# Patient Record
Sex: MALE | Race: WHITE | NOT HISPANIC OR LATINO | Employment: UNEMPLOYED | URBAN - NONMETROPOLITAN AREA
[De-identification: names, ages, dates, MRNs, and addresses within clinical notes are randomized per-mention and may not be internally consistent; named-entity substitution may affect disease eponyms.]

---

## 2016-05-17 LAB — EXTERNAL HIV SCREEN: NORMAL

## 2018-06-03 LAB
ALBUMIN SERPL BCP-MCNC: 3.9 G/DL (ref 3.5–5.7)
ALP SERPL-CCNC: 81 IU/L (ref 40–150)
ALT SERPL W P-5'-P-CCNC: 17 IU/L (ref 0–50)
AMPHETAMINES (HISTORICAL): NEGATIVE
ANION GAP SERPL CALCULATED.3IONS-SCNC: 12.8 MM/L
AST SERPL W P-5'-P-CCNC: 42 U/L (ref 8–27)
BACTERIA UR QL AUTO: ABNORMAL
BARBITURATES (HISTORICAL): NEGATIVE
BASOPHILS # BLD AUTO: 0 X3/UL (ref 0–0.3)
BASOPHILS # BLD AUTO: 0.7 % (ref 0–2)
BENZODIAZEPINES (HISTORICAL): NEGATIVE
BILIRUB SERPL-MCNC: 1 MG/DL (ref 0.3–1)
BILIRUB UR QL STRIP: NEGATIVE
BUN SERPL-MCNC: 5 MG/DL (ref 7–25)
CALCIUM SERPL-MCNC: 8.6 MG/DL (ref 8.6–10.5)
CANNABINOIDS (HISTORICAL): NEGATIVE
CHLORIDE SERPL-SCNC: 107 MM/L (ref 98–107)
CLARITY UR: CLEAR
CO2 SERPL-SCNC: 27 MM/L (ref 21–31)
COCAINE (HISTORICAL): NEGATIVE
COLOR UR: YELLOW
CREAT SERPL-MCNC: 0.65 MG/DL (ref 0.7–1.3)
DEPRECATED RDW RBC AUTO: 17.9 % (ref 11.5–14.5)
EGFR (HISTORICAL): > 60 GFR
EGFR AFRICAN AMERICAN (HISTORICAL): > 60 GFR
EOSINOPHIL # BLD AUTO: 0.1 X3/UL (ref 0–0.5)
EOSINOPHIL NFR BLD AUTO: 1.5 % (ref 0–5)
ETHANOL (HISTORICAL): 323.8 MG/DL
GLUCOSE (HISTORICAL): 112 MG/DL (ref 65–99)
GLUCOSE UR STRIP-MCNC: NEGATIVE MG/DL
HCT VFR BLD AUTO: 38.2 % (ref 42–52)
HGB BLD-MCNC: 12.6 G/DL (ref 14–18)
HGB UR QL STRIP.AUTO: NEGATIVE
KETONES UR STRIP-MCNC: NEGATIVE MG/DL
LEUKOCYTE ESTERASE UR QL STRIP: NEGATIVE
LYMPHOCYTES # BLD AUTO: 1.5 X3/UL (ref 1.2–4.2)
LYMPHOCYTES NFR BLD AUTO: 32.9 % (ref 20.5–51.1)
MCH RBC QN AUTO: 28.2 PG (ref 26–34)
MCHC RBC AUTO-ENTMCNC: 33.1 G/DL (ref 31–36)
MCV RBC AUTO: 85.2 FL (ref 81–99)
MEDICAL ALCOHOL (HISTORICAL): 0.32 %
METHADONE (HISTORICAL): NEGATIVE
MONOCYTES # BLD AUTO: 0.3 X3/UL (ref 0–1)
MONOCYTES NFR BLD AUTO: 7.8 % (ref 1.7–12)
MUCUS THREADS (HISTORICAL): PRESENT /HPF
NEUTROPHILS # BLD AUTO: 2.5 X3/UL (ref 1.4–6.5)
NEUTS SEG NFR BLD AUTO: 57.1 % (ref 42.2–75.2)
NITRITE UR QL STRIP: NEGATIVE
NON-SQ EPI CELLS URNS QL MICRO: ABNORMAL /HPF
OPIATES (HISTORICAL): NEGATIVE
OSMOLALITY, SERUM (HISTORICAL): 283 MOSM (ref 262–291)
OXYCODONE (HISTORICAL): NEGATIVE
PH UR STRIP.AUTO: 6 [PH] (ref 4.5–8)
PHENCYCLIDINE URINE (HISTORICAL): NEGATIVE
PLATELET # BLD AUTO: 74 X3/UL (ref 130–400)
PLATELET ESTIMATE (HISTORICAL): ABNORMAL
PLEASE NOTE (HISTORICAL): NORMAL
PMV BLD AUTO: 7.1 FL (ref 8.6–11.7)
POTASSIUM SERPL-SCNC: 3.8 MM/L (ref 3.5–5.5)
PROPOXYPHENE (HISTORICAL): NEGATIVE
PROT UR STRIP-MCNC: ABNORMAL MG/DL
RBC # BLD AUTO: 4.49 X6/UL (ref 4.3–5.9)
RBC #/AREA URNS AUTO: ABNORMAL /HPF
RBC MORPHOLOGY (HISTORICAL): NORMAL
SODIUM SERPL-SCNC: 143 MM/L (ref 134–143)
SP GR UR STRIP.AUTO: 1.02 (ref 1–1.03)
TOTAL PROTEIN (HISTORICAL): 6.9 G/DL (ref 6.4–8.9)
TSH SERPL DL<=0.05 MIU/L-ACNC: 2.1 UIU/M (ref 0.45–5.33)
UROBILINOGEN UR QL STRIP.AUTO: 1 EU/DL (ref 0.2–8)
WBC # BLD AUTO: 4.4 X3/UL (ref 4.8–10.8)
WBC #/AREA URNS AUTO: ABNORMAL /HPF

## 2019-04-10 ENCOUNTER — APPOINTMENT (EMERGENCY)
Dept: CT IMAGING | Facility: HOSPITAL | Age: 59
DRG: 469 | End: 2019-04-10
Payer: COMMERCIAL

## 2019-04-10 ENCOUNTER — HOSPITAL ENCOUNTER (INPATIENT)
Facility: HOSPITAL | Age: 59
LOS: 1 days | Discharge: HOME | DRG: 469 | End: 2019-04-11
Attending: EMERGENCY MEDICINE | Admitting: INTERNAL MEDICINE
Payer: COMMERCIAL

## 2019-04-10 DIAGNOSIS — D69.6 THROMBOCYTOPENIA (HCC): ICD-10-CM

## 2019-04-10 DIAGNOSIS — F10.929 ALCOHOL INTOXICATION (HCC): ICD-10-CM

## 2019-04-10 DIAGNOSIS — I95.9 HYPOTENSION: ICD-10-CM

## 2019-04-10 DIAGNOSIS — E86.0 DEHYDRATION: ICD-10-CM

## 2019-04-10 DIAGNOSIS — S00.03XA HEMATOMA OF SCALP, INITIAL ENCOUNTER: ICD-10-CM

## 2019-04-10 DIAGNOSIS — R55 SYNCOPE: Primary | ICD-10-CM

## 2019-04-10 DIAGNOSIS — N17.9 AKI (ACUTE KIDNEY INJURY) (HCC): ICD-10-CM

## 2019-04-10 DIAGNOSIS — E87.6 HYPOKALEMIA: ICD-10-CM

## 2019-04-10 PROBLEM — R79.89 ELEVATED LACTIC ACID LEVEL: Status: ACTIVE | Noted: 2019-04-10

## 2019-04-10 PROBLEM — D73.89 LESION OF SPLEEN: Status: ACTIVE | Noted: 2019-04-10

## 2019-04-10 PROBLEM — R91.1 PULMONARY NODULE: Status: ACTIVE | Noted: 2019-04-10

## 2019-04-10 PROBLEM — K76.6 PORTAL HYPERTENSION (HCC): Status: ACTIVE | Noted: 2019-04-10

## 2019-04-10 PROBLEM — K74.60 LIVER CIRRHOSIS (HCC): Status: ACTIVE | Noted: 2019-04-10

## 2019-04-10 PROBLEM — R59.1 LYMPHADENOPATHY: Status: ACTIVE | Noted: 2019-04-10

## 2019-04-10 PROBLEM — B19.20 HEPATITIS C: Status: ACTIVE | Noted: 2019-04-10

## 2019-04-10 PROBLEM — F10.10 ALCOHOL ABUSE: Status: ACTIVE | Noted: 2017-12-22

## 2019-04-10 LAB
ALBUMIN SERPL BCP-MCNC: 3.3 G/DL (ref 3.5–5)
ALP SERPL-CCNC: 90 U/L (ref 46–116)
ALT SERPL W P-5'-P-CCNC: 34 U/L (ref 12–78)
AMPHETAMINES SERPL QL SCN: NEGATIVE
ANION GAP SERPL CALCULATED.3IONS-SCNC: 10 MMOL/L (ref 4–13)
ANION GAP SERPL CALCULATED.3IONS-SCNC: 15 MMOL/L (ref 4–13)
APAP SERPL-MCNC: <2 UG/ML (ref 10–20)
APTT PPP: 33 SECONDS (ref 26–38)
AST SERPL W P-5'-P-CCNC: 55 U/L (ref 5–45)
ATRIAL RATE: 71 BPM
ATRIAL RATE: 76 BPM
ATRIAL RATE: 79 BPM
ATRIAL RATE: 83 BPM
BARBITURATES UR QL: NEGATIVE
BASOPHILS # BLD AUTO: 0.04 THOUSANDS/ΜL (ref 0–0.1)
BASOPHILS NFR BLD AUTO: 1 % (ref 0–1)
BENZODIAZ UR QL: NEGATIVE
BILIRUB SERPL-MCNC: 1.6 MG/DL (ref 0.2–1)
BILIRUB UR QL STRIP: NEGATIVE
BUN SERPL-MCNC: 27 MG/DL (ref 5–25)
BUN SERPL-MCNC: 36 MG/DL (ref 5–25)
CALCIUM SERPL-MCNC: 7.6 MG/DL (ref 8.3–10.1)
CALCIUM SERPL-MCNC: 8.6 MG/DL (ref 8.3–10.1)
CHLORIDE SERPL-SCNC: 106 MMOL/L (ref 100–108)
CHLORIDE SERPL-SCNC: 95 MMOL/L (ref 100–108)
CK MB SERPL-MCNC: 1.1 % (ref 0–2.5)
CK MB SERPL-MCNC: 4.8 NG/ML (ref 0–5)
CK SERPL-CCNC: 421 U/L (ref 39–308)
CLARITY UR: CLEAR
CO2 SERPL-SCNC: 24 MMOL/L (ref 21–32)
CO2 SERPL-SCNC: 26 MMOL/L (ref 21–32)
COCAINE UR QL: NEGATIVE
COLOR UR: YELLOW
CREAT SERPL-MCNC: 1.57 MG/DL (ref 0.6–1.3)
CREAT SERPL-MCNC: 2.58 MG/DL (ref 0.6–1.3)
EOSINOPHIL # BLD AUTO: 0.07 THOUSAND/ΜL (ref 0–0.61)
EOSINOPHIL NFR BLD AUTO: 1 % (ref 0–6)
ERYTHROCYTE [DISTWIDTH] IN BLOOD BY AUTOMATED COUNT: 17.3 % (ref 11.6–15.1)
ERYTHROCYTE [DISTWIDTH] IN BLOOD BY AUTOMATED COUNT: 17.3 % (ref 11.6–15.1)
ETHANOL SERPL-MCNC: 267 MG/DL (ref 0–3)
GFR SERPL CREATININE-BSD FRML MDRD: 26 ML/MIN/1.73SQ M
GFR SERPL CREATININE-BSD FRML MDRD: 48 ML/MIN/1.73SQ M
GLUCOSE SERPL-MCNC: 102 MG/DL (ref 65–140)
GLUCOSE SERPL-MCNC: 103 MG/DL (ref 65–140)
GLUCOSE SERPL-MCNC: 79 MG/DL (ref 65–140)
GLUCOSE UR STRIP-MCNC: NEGATIVE MG/DL
HCT VFR BLD AUTO: 37.4 % (ref 36.5–49.3)
HCT VFR BLD AUTO: 40.1 % (ref 36.5–49.3)
HGB BLD-MCNC: 11.8 G/DL (ref 12–17)
HGB BLD-MCNC: 13.1 G/DL (ref 12–17)
HGB UR QL STRIP.AUTO: NEGATIVE
IMM GRANULOCYTES # BLD AUTO: 0.03 THOUSAND/UL (ref 0–0.2)
IMM GRANULOCYTES NFR BLD AUTO: 1 % (ref 0–2)
INR PPP: 1.21 (ref 0.86–1.17)
KETONES UR STRIP-MCNC: NEGATIVE MG/DL
LACTATE SERPL-SCNC: 1.2 MMOL/L (ref 0.5–2)
LACTATE SERPL-SCNC: 1.4 MMOL/L (ref 0.5–2)
LACTATE SERPL-SCNC: 2.4 MMOL/L (ref 0.5–2)
LEUKOCYTE ESTERASE UR QL STRIP: NEGATIVE
LIPASE SERPL-CCNC: 420 U/L (ref 73–393)
LYMPHOCYTES # BLD AUTO: 1.38 THOUSANDS/ΜL (ref 0.6–4.47)
LYMPHOCYTES NFR BLD AUTO: 21 % (ref 14–44)
MAGNESIUM SERPL-MCNC: 1.7 MG/DL (ref 1.6–2.6)
MAGNESIUM SERPL-MCNC: 1.9 MG/DL (ref 1.6–2.6)
MCH RBC QN AUTO: 29.9 PG (ref 26.8–34.3)
MCH RBC QN AUTO: 30.1 PG (ref 26.8–34.3)
MCHC RBC AUTO-ENTMCNC: 31.6 G/DL (ref 31.4–37.4)
MCHC RBC AUTO-ENTMCNC: 32.7 G/DL (ref 31.4–37.4)
MCV RBC AUTO: 92 FL (ref 82–98)
MCV RBC AUTO: 95 FL (ref 82–98)
METHADONE UR QL: NEGATIVE
MONOCYTES # BLD AUTO: 0.91 THOUSAND/ΜL (ref 0.17–1.22)
MONOCYTES NFR BLD AUTO: 14 % (ref 4–12)
NEUTROPHILS # BLD AUTO: 4.19 THOUSANDS/ΜL (ref 1.85–7.62)
NEUTS SEG NFR BLD AUTO: 62 % (ref 43–75)
NITRITE UR QL STRIP: NEGATIVE
NRBC BLD AUTO-RTO: 0 /100 WBCS
NT-PROBNP SERPL-MCNC: 425 PG/ML
OPIATES UR QL SCN: NEGATIVE
OSMOLALITY UR/SERPL-RTO: 356 MMOL/KG (ref 282–298)
OSMOLALITY UR: 221 MMOL/KG
P AXIS: 39 DEGREES
P AXIS: 51 DEGREES
P AXIS: 55 DEGREES
P AXIS: 57 DEGREES
PCP UR QL: NEGATIVE
PH UR STRIP.AUTO: 6.5 [PH]
PHOSPHATE SERPL-MCNC: 5 MG/DL (ref 2.7–4.5)
PHOSPHATE SERPL-MCNC: 5.1 MG/DL (ref 2.7–4.5)
PLATELET # BLD AUTO: 46 THOUSANDS/UL (ref 149–390)
PLATELET # BLD AUTO: 64 THOUSANDS/UL (ref 149–390)
PMV BLD AUTO: 10.7 FL (ref 8.9–12.7)
PMV BLD AUTO: 11 FL (ref 8.9–12.7)
POTASSIUM SERPL-SCNC: 3.2 MMOL/L (ref 3.5–5.3)
POTASSIUM SERPL-SCNC: 3.4 MMOL/L (ref 3.5–5.3)
PR INTERVAL: 172 MS
PR INTERVAL: 178 MS
PROCALCITONIN SERPL-MCNC: 0.13 NG/ML
PROT SERPL-MCNC: 7 G/DL (ref 6.4–8.2)
PROT UR STRIP-MCNC: NEGATIVE MG/DL
PROTHROMBIN TIME: 14.7 SECONDS (ref 11.8–14.2)
QRS AXIS: 10 DEGREES
QRS AXIS: 13 DEGREES
QRS AXIS: 24 DEGREES
QRS AXIS: 61 DEGREES
QRSD INTERVAL: 102 MS
QRSD INTERVAL: 106 MS
QRSD INTERVAL: 108 MS
QRSD INTERVAL: 98 MS
QT INTERVAL: 430 MS
QT INTERVAL: 440 MS
QT INTERVAL: 442 MS
QT INTERVAL: 452 MS
QTC INTERVAL: 491 MS
QTC INTERVAL: 495 MS
QTC INTERVAL: 505 MS
QTC INTERVAL: 506 MS
RBC # BLD AUTO: 3.94 MILLION/UL (ref 3.88–5.62)
RBC # BLD AUTO: 4.35 MILLION/UL (ref 3.88–5.62)
SALICYLATES SERPL-MCNC: 3.9 MG/DL (ref 3–20)
SODIUM SERPL-SCNC: 134 MMOL/L (ref 136–145)
SODIUM SERPL-SCNC: 142 MMOL/L (ref 136–145)
SP GR UR STRIP.AUTO: <=1.005 (ref 1–1.03)
T WAVE AXIS: 48 DEGREES
T WAVE AXIS: 57 DEGREES
T WAVE AXIS: 59 DEGREES
T WAVE AXIS: 63 DEGREES
THC UR QL: NEGATIVE
TROPONIN I SERPL-MCNC: 0.02 NG/ML
TROPONIN I SERPL-MCNC: <0.02 NG/ML
TROPONIN I SERPL-MCNC: <0.02 NG/ML
UROBILINOGEN UR QL STRIP.AUTO: 0.2 E.U./DL
VENTRICULAR RATE: 71 BPM
VENTRICULAR RATE: 76 BPM
VENTRICULAR RATE: 79 BPM
VENTRICULAR RATE: 83 BPM
WBC # BLD AUTO: 3.66 THOUSAND/UL (ref 4.31–10.16)
WBC # BLD AUTO: 6.62 THOUSAND/UL (ref 4.31–10.16)

## 2019-04-10 PROCEDURE — 36415 COLL VENOUS BLD VENIPUNCTURE: CPT | Performed by: EMERGENCY MEDICINE

## 2019-04-10 PROCEDURE — 83935 ASSAY OF URINE OSMOLALITY: CPT | Performed by: EMERGENCY MEDICINE

## 2019-04-10 PROCEDURE — 99285 EMERGENCY DEPT VISIT HI MDM: CPT

## 2019-04-10 PROCEDURE — 93010 ELECTROCARDIOGRAM REPORT: CPT | Performed by: INTERNAL MEDICINE

## 2019-04-10 PROCEDURE — 80053 COMPREHEN METABOLIC PANEL: CPT | Performed by: EMERGENCY MEDICINE

## 2019-04-10 PROCEDURE — 80329 ANALGESICS NON-OPIOID 1 OR 2: CPT | Performed by: EMERGENCY MEDICINE

## 2019-04-10 PROCEDURE — 82550 ASSAY OF CK (CPK): CPT | Performed by: EMERGENCY MEDICINE

## 2019-04-10 PROCEDURE — 84484 ASSAY OF TROPONIN QUANT: CPT | Performed by: NURSE PRACTITIONER

## 2019-04-10 PROCEDURE — 83735 ASSAY OF MAGNESIUM: CPT | Performed by: EMERGENCY MEDICINE

## 2019-04-10 PROCEDURE — 84100 ASSAY OF PHOSPHORUS: CPT | Performed by: EMERGENCY MEDICINE

## 2019-04-10 PROCEDURE — 76705 ECHO EXAM OF ABDOMEN: CPT | Performed by: EMERGENCY MEDICINE

## 2019-04-10 PROCEDURE — 84484 ASSAY OF TROPONIN QUANT: CPT | Performed by: EMERGENCY MEDICINE

## 2019-04-10 PROCEDURE — 81003 URINALYSIS AUTO W/O SCOPE: CPT | Performed by: EMERGENCY MEDICINE

## 2019-04-10 PROCEDURE — 82948 REAGENT STRIP/BLOOD GLUCOSE: CPT

## 2019-04-10 PROCEDURE — 85025 COMPLETE CBC W/AUTO DIFF WBC: CPT | Performed by: EMERGENCY MEDICINE

## 2019-04-10 PROCEDURE — 83930 ASSAY OF BLOOD OSMOLALITY: CPT | Performed by: EMERGENCY MEDICINE

## 2019-04-10 PROCEDURE — 71250 CT THORAX DX C-: CPT

## 2019-04-10 PROCEDURE — 85610 PROTHROMBIN TIME: CPT | Performed by: EMERGENCY MEDICINE

## 2019-04-10 PROCEDURE — 74176 CT ABD & PELVIS W/O CONTRAST: CPT

## 2019-04-10 PROCEDURE — 83880 ASSAY OF NATRIURETIC PEPTIDE: CPT | Performed by: EMERGENCY MEDICINE

## 2019-04-10 PROCEDURE — 72125 CT NECK SPINE W/O DYE: CPT

## 2019-04-10 PROCEDURE — 93005 ELECTROCARDIOGRAM TRACING: CPT

## 2019-04-10 PROCEDURE — 85027 COMPLETE CBC AUTOMATED: CPT | Performed by: NURSE PRACTITIONER

## 2019-04-10 PROCEDURE — 84100 ASSAY OF PHOSPHORUS: CPT | Performed by: NURSE PRACTITIONER

## 2019-04-10 PROCEDURE — 83735 ASSAY OF MAGNESIUM: CPT | Performed by: NURSE PRACTITIONER

## 2019-04-10 PROCEDURE — 70450 CT HEAD/BRAIN W/O DYE: CPT

## 2019-04-10 PROCEDURE — 85730 THROMBOPLASTIN TIME PARTIAL: CPT | Performed by: EMERGENCY MEDICINE

## 2019-04-10 PROCEDURE — 80320 DRUG SCREEN QUANTALCOHOLS: CPT | Performed by: EMERGENCY MEDICINE

## 2019-04-10 PROCEDURE — 84145 PROCALCITONIN (PCT): CPT | Performed by: EMERGENCY MEDICINE

## 2019-04-10 PROCEDURE — 82553 CREATINE MB FRACTION: CPT | Performed by: EMERGENCY MEDICINE

## 2019-04-10 PROCEDURE — 83605 ASSAY OF LACTIC ACID: CPT | Performed by: EMERGENCY MEDICINE

## 2019-04-10 PROCEDURE — 99285 EMERGENCY DEPT VISIT HI MDM: CPT | Performed by: EMERGENCY MEDICINE

## 2019-04-10 PROCEDURE — 96360 HYDRATION IV INFUSION INIT: CPT

## 2019-04-10 PROCEDURE — 80307 DRUG TEST PRSMV CHEM ANLYZR: CPT | Performed by: EMERGENCY MEDICINE

## 2019-04-10 PROCEDURE — 99223 1ST HOSP IP/OBS HIGH 75: CPT | Performed by: FAMILY MEDICINE

## 2019-04-10 PROCEDURE — 83690 ASSAY OF LIPASE: CPT | Performed by: EMERGENCY MEDICINE

## 2019-04-10 PROCEDURE — 83605 ASSAY OF LACTIC ACID: CPT | Performed by: NURSE PRACTITIONER

## 2019-04-10 PROCEDURE — 80048 BASIC METABOLIC PNL TOTAL CA: CPT | Performed by: NURSE PRACTITIONER

## 2019-04-10 RX ORDER — LIDOCAINE 50 MG/G
1 PATCH TOPICAL ONCE
Status: COMPLETED | OUTPATIENT
Start: 2019-04-10 | End: 2019-04-10

## 2019-04-10 RX ORDER — SODIUM CHLORIDE 9 MG/ML
100 INJECTION, SOLUTION INTRAVENOUS CONTINUOUS
Status: DISCONTINUED | OUTPATIENT
Start: 2019-04-10 | End: 2019-04-11 | Stop reason: HOSPADM

## 2019-04-10 RX ORDER — POTASSIUM CHLORIDE 20MEQ/15ML
40 LIQUID (ML) ORAL ONCE
Status: COMPLETED | OUTPATIENT
Start: 2019-04-10 | End: 2019-04-10

## 2019-04-10 RX ORDER — METHOCARBAMOL 500 MG/1
500 TABLET, FILM COATED ORAL ONCE
Status: COMPLETED | OUTPATIENT
Start: 2019-04-10 | End: 2019-04-10

## 2019-04-10 RX ORDER — LORAZEPAM 0.5 MG/1
0.5 TABLET ORAL EVERY 8 HOURS
Status: DISCONTINUED | OUTPATIENT
Start: 2019-04-10 | End: 2019-04-11 | Stop reason: HOSPADM

## 2019-04-10 RX ORDER — FOLIC ACID 1 MG/1
1 TABLET ORAL DAILY
Status: DISCONTINUED | OUTPATIENT
Start: 2019-04-10 | End: 2019-04-11 | Stop reason: HOSPADM

## 2019-04-10 RX ORDER — FOLIC ACID 1 MG/1
1 TABLET ORAL ONCE
Status: COMPLETED | OUTPATIENT
Start: 2019-04-10 | End: 2019-04-10

## 2019-04-10 RX ORDER — ACETAMINOPHEN 325 MG/1
650 TABLET ORAL ONCE
Status: DISCONTINUED | OUTPATIENT
Start: 2019-04-10 | End: 2019-04-11 | Stop reason: HOSPADM

## 2019-04-10 RX ORDER — NICOTINE 21 MG/24HR
1 PATCH, TRANSDERMAL 24 HOURS TRANSDERMAL DAILY
Status: DISCONTINUED | OUTPATIENT
Start: 2019-04-10 | End: 2019-04-11 | Stop reason: HOSPADM

## 2019-04-10 RX ORDER — LIDOCAINE 50 MG/G
1 PATCH TOPICAL EVERY 24 HOURS
Status: DISCONTINUED | OUTPATIENT
Start: 2019-04-10 | End: 2019-04-11 | Stop reason: HOSPADM

## 2019-04-10 RX ORDER — ONDANSETRON 2 MG/ML
1 INJECTION INTRAMUSCULAR; INTRAVENOUS ONCE
Status: COMPLETED | OUTPATIENT
Start: 2019-04-10 | End: 2019-04-10

## 2019-04-10 RX ORDER — UBIDECARENONE 75 MG
100 CAPSULE ORAL DAILY
Status: DISCONTINUED | OUTPATIENT
Start: 2019-04-10 | End: 2019-04-11 | Stop reason: HOSPADM

## 2019-04-10 RX ORDER — LORAZEPAM 2 MG/ML
1 INJECTION INTRAMUSCULAR EVERY 4 HOURS PRN
Status: DISCONTINUED | OUTPATIENT
Start: 2019-04-10 | End: 2019-04-11 | Stop reason: HOSPADM

## 2019-04-10 RX ORDER — THIAMINE MONONITRATE (VIT B1) 100 MG
100 TABLET ORAL ONCE
Status: COMPLETED | OUTPATIENT
Start: 2019-04-10 | End: 2019-04-10

## 2019-04-10 RX ORDER — TRAZODONE HYDROCHLORIDE 100 MG/1
100 TABLET ORAL
COMMUNITY
End: 2020-01-07 | Stop reason: SDUPTHER

## 2019-04-10 RX ADMIN — Medication 100 MG: at 03:09

## 2019-04-10 RX ADMIN — LORAZEPAM 0.5 MG: 0.5 TABLET ORAL at 23:20

## 2019-04-10 RX ADMIN — MULTIPLE VITAMINS W/ MINERALS TAB 1 TABLET: TAB at 03:09

## 2019-04-10 RX ADMIN — Medication 100 MG: at 05:25

## 2019-04-10 RX ADMIN — NICOTINE 1 PATCH: 21 PATCH TRANSDERMAL at 09:49

## 2019-04-10 RX ADMIN — SODIUM CHLORIDE 1000 ML: 0.9 INJECTION, SOLUTION INTRAVENOUS at 02:05

## 2019-04-10 RX ADMIN — POTASSIUM CHLORIDE 40 MEQ: 20 SOLUTION ORAL at 02:28

## 2019-04-10 RX ADMIN — FOLIC ACID 1 MG: 1 TABLET ORAL at 09:48

## 2019-04-10 RX ADMIN — LIDOCAINE 1 PATCH: 50 PATCH TOPICAL at 21:21

## 2019-04-10 RX ADMIN — SODIUM CHLORIDE 100 ML/HR: 0.9 INJECTION, SOLUTION INTRAVENOUS at 22:21

## 2019-04-10 RX ADMIN — METHOCARBAMOL TABLETS 500 MG: 500 TABLET, COATED ORAL at 03:16

## 2019-04-10 RX ADMIN — LORAZEPAM 0.5 MG: 0.5 TABLET ORAL at 16:55

## 2019-04-10 RX ADMIN — SODIUM CHLORIDE 1000 ML: 0.9 INJECTION, SOLUTION INTRAVENOUS at 01:35

## 2019-04-10 RX ADMIN — SODIUM CHLORIDE 100 ML/HR: 0.9 INJECTION, SOLUTION INTRAVENOUS at 12:56

## 2019-04-10 RX ADMIN — SODIUM CHLORIDE 1000 ML: 0.9 INJECTION, SOLUTION INTRAVENOUS at 01:45

## 2019-04-10 RX ADMIN — LIDOCAINE 1 PATCH: 50 PATCH TOPICAL at 03:02

## 2019-04-10 RX ADMIN — FOLIC ACID 1 MG: 1 TABLET ORAL at 03:09

## 2019-04-10 RX ADMIN — VITAM B12 100 MCG: 100 TAB at 09:45

## 2019-04-10 RX ADMIN — SODIUM CHLORIDE 1000 ML: 0.9 INJECTION, SOLUTION INTRAVENOUS at 03:20

## 2019-04-10 RX ADMIN — LORAZEPAM 0.5 MG: 0.5 TABLET ORAL at 09:48

## 2019-04-10 RX ADMIN — LORAZEPAM 1 MG: 2 INJECTION, SOLUTION INTRAMUSCULAR; INTRAVENOUS at 05:14

## 2019-04-10 RX ADMIN — SODIUM CHLORIDE 150 ML/HR: 0.9 INJECTION, SOLUTION INTRAVENOUS at 02:53

## 2019-04-10 NOTE — H&P
H&P- Samm Plana 1960, 64 y o  male MRN: [id number]    Unit/Bed#:  Encounter: [id number]    Primary Care Provider: Melvi Ba MD   Date and time admitted to hospital: 4/10/2019  1:32 AM        Syncope  Assessment & Plan  Patient reports multiple syncopal events-admits to last 1 being this morning admits to loss of consciousness  4/10/19-CTA head-"IMPRESSION:-Small left posterior scalp hematoma suspected but no calvarial fracture or acute intracranial process is seen "  Ambulate with assistance  Admit to step-down- continuous cardiopulmonary monitoring  CIWA protocol in place  Elevated ETOH level 267      Elevated lactic acid level  Assessment & Plan  Lactic 2 4 on admission  Was bolus for L normal saline in emergency room  Continue normal saline 150 mL/hour  Trend lactic acid q 2 hours until normalization  Thiamine 100 mg ordered      Acute kidney injury ([facility] [address] )  Assessment & Plan  Current creatinine 2 5 a baseline creatinine 0 8-0 9  Most likely secondary to dehydration  Bolus 4 L  normal saline in the ER-continued bolus saline at 150 mL/hour  ETOH level 267  Trend BMP-continue hydration-if further deterioration consider Nephrology consult    Thrombocytopenia (HCC)  Assessment & Plan  Platelet count 64  Known history of cirrhosis of the liver and portal hypertension and esophageal varices  Recommend GI consult  Trend CBC   Hemoglobin stable at 13 1      Hypokalemia  Assessment & Plan  Potassium 3 2 in ER--repleted  Trend BMP-repleted as needed  Admit to ICU-continuous cardiopulmonary monitoring     Essential hypertension with goal blood pressure less than 140/90  Assessment & Plan  Patient currently hypotensive 70-90/40-60  Continue normal saline at 150 ml/hr  Admit to ICU-steps down level 1-continuous cardiopulmonary monitoring      Alcohol abuse  Assessment & Plan  ETOH level 267  CIWA  protocol place  Continue normal saline 150 an hour  Ordered thiamine-folic [id number] ordered            VTE Prophylaxis: Ambulate  / reason for no mechanical VTE prophylaxis Ambulated   Code Status:  Full  POLST: POLST is not applicable to this patient    Anticipated Length of Stay:  Patient will be admitted on an Inpatient basis with an anticipated length of stay of  greater than 2 midnights  Justification for Hospital Stay:  Thrombocytopenia, syncope, hypokalemia, elevated lactic acid, alcohol abuse, and acute kidney injury  Total Time for Visit, including Counseling / Coordination of Care: 1 hour  Greater than 50% of this total time spent on direct patient counseling and coordination of care  Chief Complaint:   Syncopal event-multiple    History of Present Illness:    Markell Mclain is a 64 y o  male who presented to the emergency room for evaluation of syncopal event this evening  Patient reports multiple syncopal events over the past couple weeks  Patient also admits to decreased intake  Patient admits to positive loss of consciousness, lightheadedness, convulsions, admits to diarrhea denies vomiting denies nausea denies vision change denies chest pain denies shortness of breath denies dysuria urgency or frequency denies hematochezia or melena  Patient does admit to have an alcoholic beverage assisted reports only having 3 beers even though his ETOH level was 267  Images and labs were collected in the emergency room-see below  Patient does have a significant past medical history which was retrieved from Care everywhere [facility] revealing a Hold views liver cirrhosis portal hypertension esophageal varices, hypertension, history of lower GI bleed, ascites  Patient was admitted by emergency room doctor for alcohol intoxication, hypokalemia, syncope patient was admitted to step-down  Review of Systems:    Review of Systems   Gastrointestinal: Positive for diarrhea  Poor intake past 3 days   Neurological: Positive for syncope, weakness and headaches     All other systems reviewed and are negative  Past Medical and Surgical History:     Past Medical History:   Diagnosis Date    Esophageal varices ([address] )     Diagnosed in [facility] in 2018    GI bleed     2018    Hepatitis C     Hyperlipidemia     Hypertension        History reviewed  No pertinent surgical history  Meds/Allergies:    Prior to Admission medications    Medication Sig Start Date End Date Taking? Authorizing Provider   traZODone (DESYREL) 100 mg tablet Take 150 mg by mouth daily at bedtime   Yes Historical Provider, MD   lisinopril-hydrochlorothiazide (PRINZIDE,ZESTORETIC) 10-12 5 MG per tablet Take 1 tablet by mouth daily    Historical Provider, MD   traMADol (ULTRAM) 50 mg tablet Take 1 tablet by mouth every 6 (six) hours as needed for moderate pain (Do not drive or drink alcohol while using ) 10/30/16 4/10/19  Fili Yost,      I have reviewed home medications using allscripts  Allergies: Allergies   Allergen Reactions    Vicodin [Hydrocodone-Acetaminophen] Rash       Social History:     Marital Status: Single   Occupation:  Unknown  Patient Pre-hospital Living Situation:  Independent  Patient Pre-hospital Level of Mobility:  Independent  Patient Pre-hospital Diet Restrictions:  Denies  Substance Use History:   Social History     Substance and Sexual Activity   Alcohol Use Yes     Social History     Tobacco Use   Smoking Status Current Every Day Smoker    Packs/day: 0 50     Social History     Substance and Sexual Activity   Drug Use No       Family History:    History reviewed  No pertinent family history  Physical Exam:     Vitals:   Blood Pressure: 101/68 (04/10/19 0345)  Pulse: 76 (04/10/19 0345)  Temperature: 98 3 °F (36 8 °C) (04/10/19 0124)  Respirations: 22 (04/10/19 0345)  SpO2: 98 % (04/10/19 0345)    Physical Exam   Constitutional: He appears well-developed and well-nourished  No distress  HENT:   Head: Normocephalic and atraumatic     Eyes: Pupils are equal, round, and reactive to light  EOM are normal  Right eye exhibits no discharge  Left eye exhibits no discharge  No scleral icterus  Neck: Normal range of motion  Neck supple  Cardiovascular: Normal rate, regular rhythm, normal heart sounds and intact distal pulses  Exam reveals no gallop and no friction rub  No murmur heard  Pulmonary/Chest: Effort normal and breath sounds normal  No stridor  No respiratory distress  Abdominal: Soft  Bowel sounds are normal  He exhibits no distension  There is no tenderness  There is no guarding  Musculoskeletal: He exhibits no edema, tenderness or deformity  Neurological: He is alert  No cranial nerve deficit  GCS eye subscore is 4  GCS verbal subscore is 4  GCS motor subscore is 6  Skin: Skin is warm and dry  Capillary refill takes 2 to 3 seconds  No rash noted  He is not diaphoretic  No erythema  No pallor  Psychiatric: He has a normal mood and affect  His behavior is normal            Additional Data:     Lab Results: I have personally reviewed pertinent reports  Results from last 7 days   Lab Units 04/10/19  0134   WBC Thousand/uL 6 62   HEMOGLOBIN g/dL 13 1   HEMATOCRIT % 40 1   PLATELETS Thousands/uL 64*   NEUTROS PCT % 62   LYMPHS PCT % 21   MONOS PCT % 14*   EOS PCT % 1     Results from last 7 days   Lab Units 04/10/19  0134   POTASSIUM mmol/L 3 2*   CHLORIDE mmol/L 95*   CO2 mmol/L 24   BUN mg/dL 36*   CREATININE mg/dL 2 58*   CALCIUM mg/dL 8 6   ALK PHOS U/L 90   ALT U/L 34   AST U/L 55*     Results from last 7 days   Lab Units 04/10/19  0134   INR  1 21*       Imaging: I have personally reviewed pertinent reports  Ct Chest Abdomen Pelvis Wo Contrast    Result Date: 4/10/2019  Narrative: CT CHEST, ABDOMEN AND PELVIS WITHOUT IV CONTRAST INDICATION:   frequent falls  COMPARISON:  None   TECHNIQUE: CT examination of the chest, abdomen and pelvis was performed without intravenous contrast   Axial, sagittal, and coronal 2D reformatted images were created from the source data and submitted for interpretation  Radiation dose length product (DLP) for this visit:  770 21 mGy-cm   This examination, like all CT scans performed in the [facility], was performed utilizing techniques to minimize radiation dose exposure, including the use of iterative  reconstruction and automated exposure control  Enteric contrast was not administered  FINDINGS: CHEST LUNGS: Emphysematous changes in the lungs are visualized  Atelectasis seen within the lung bases  The trachea and central bronchial tree are patent  There is a 4 mm pulmonary nodule in the left upper lobe  Based on current Fleischner Society 2017 Guidelines on incidental pulmonary nodule, no routine follow-up is needed if the patient is considered low risk for lung cancer  If the patient is considered high risk for lung cancer, 12 month follow-up non-contrast chest CT is recommended  PLEURA:  Unremarkable  HEART/GREAT VESSELS:  Unremarkable for patient's age  MEDIASTINUM AND SHU:  Unremarkable  CHEST WALL AND LOWER NECK:   Unremarkable  ABDOMEN LIVER/BILIARY TREE:  The liver demonstrates cirrhotic morphology  GALLBLADDER:  No calcified gallstones  No pericholecystic inflammatory change  SPLEEN:  The spleen is markedly enlarged  PANCREAS:  Unremarkable  ADRENAL GLANDS:  Unremarkable  KIDNEYS/URETERS:  Unremarkable  No hydronephrosis  STOMACH AND BOWEL:  Unremarkable  APPENDIX:  No findings to suggest appendicitis  ABDOMINOPELVIC CAVITY:  There is a tiny hypodensity adjacent to the spleen measuring 3 2 cm likely representing a splenule  Upper abdominal, retroperitoneal and periaortic lymph nodes measuring up to 1 cm are visualized  VESSELS:  Unremarkable for patient's age  PELVIS REPRODUCTIVE ORGANS:  Unremarkable for patient's age  URINARY BLADDER:  Unremarkable  ABDOMINAL WALL/INGUINAL REGIONS:  Unremarkable  OSSEOUS STRUCTURES:  Old right-sided posterior rib fractures are seen       Impression: Cirrhotic liver with portal hypertension  Small pulmonary nodule in the left upper lobe for which recommendations as described above  Workstation performed: [id number]     Ct Head Without Contrast    Result Date: 4/10/2019  Narrative: CT BRAIN - WITHOUT CONTRAST INDICATION:   frequent falls  Dizziness, chronic alcohol consumption COMPARISON:  None  TECHNIQUE:  CT examination of the brain was performed  In addition to axial images, coronal 2D reformatted images were created and submitted for interpretation  Radiation dose length product (DLP) for this visit:  954 39 mGy-cm   This examination, like all CT scans performed in the [facility], was performed utilizing techniques to minimize radiation dose exposure, including the use of iterative  reconstruction and automated exposure control  IMAGE QUALITY:  Diagnostic  FINDINGS: PARENCHYMA: Decreased attenuation is noted in periventricular and subcortical white matter demonstrating an appearance that is statistically most likely to represent mild microangiopathic change  No intracranial mass, mass effect or midline shift  No CT signs of acute territorial infarction  No acute parenchymal hemorrhage  Gray-white differentiation appears maintained  Mild generalized parenchymal atrophy  VENTRICLES AND EXTRA-AXIAL SPACES:  Ventricles and extra-axial CSF spaces are prominent commensurate with the degree of volume loss  No hydrocephalus  No acute extra-axial hemorrhage  VISUALIZED ORBITS AND PARANASAL SINUSES:  Mild mucosal thickening in the bilateral ethmoid sinuses with opacification of several left ethmoid air cells  Mild mucosal thickening in the bilateral frontal sinuses as well  The paranasal sinuses otherwise are grossly clear  The orbits appear unremarkable  CALVARIUM AND EXTRACRANIAL SOFT TISSUES:  Small left posterior scalp hematoma is suspected  No calvarial fracture       Impression: Small left posterior scalp hematoma suspected but no calvarial fracture or acute intracranial process is seen  Other findings as above  Workstation performed: [id number]     Ct Spine Cervical Without Contrast    Result Date: 4/10/2019  Narrative: CT CERVICAL SPINE - WITHOUT CONTRAST INDICATION:   frequent falls  COMPARISON:  None  TECHNIQUE:  CT examination of the cervical spine was performed without intravenous contrast   Contiguous axial images were obtained  Sagittal and coronal reconstructions were performed  Radiation dose length product (DLP) for this visit:  367 22 mGy-cm   This examination, like all CT scans performed in the [facility], was performed utilizing techniques to minimize radiation dose exposure, including the use of iterative  reconstruction and automated exposure control  IMAGE QUALITY:  Diagnostic  FINDINGS: ALIGNMENT:  Anterior listhesis of C3 on C4 is seen  VERTEBRAL BODIES:  No fracture  DEGENERATIVE CHANGES:  Moderate multilevel cervical degenerative changes are noted  No critical central canal stenosis  PREVERTEBRAL AND PARASPINAL SOFT TISSUES:  Unremarkable  THORACIC INLET:  Please refer to the concurrent chest, abdomen, and pelvic CT report for description of the thoracic inlet findings  Impression: No cervical spine fracture or traumatic malalignment  Workstation performed: [id number]       EKG, Pathology, and Other Studies Reviewed on Admission:   · EKG:  Reviewed    Allscripts / Epic Records Reviewed: Yes     ** Please Note: This note has been constructed using a voice recognition system   **

## 2019-04-10 NOTE — ASSESSMENT & PLAN NOTE
Patient currently hypotensive 70-90/40-60  Continue normal saline at 150 ml/hr  Admit to ICU-steps down level 1-continuous cardiopulmonary monitoring

## 2019-04-10 NOTE — ASSESSMENT & PLAN NOTE
Current creatinine 2 5 a baseline creatinine 0 8-0 9  Most likely secondary to dehydration  Bolus 4 L  normal saline in the ER-continued bolus saline at 150 mL/hour  ETOH level 267  Trend BMP-continue hydration-if further deterioration consider Nephrology consult

## 2019-04-10 NOTE — ASSESSMENT & PLAN NOTE
Potassium 3 2 in ER--repleted  Trend BMP-repleted as needed  Admit to ICU-continuous cardiopulmonary monitoring

## 2019-04-10 NOTE — ED PROVIDER NOTES
History  Chief Complaint   Patient presents with    Dizziness     pt stated recent hx of dizziness and falling,  admits co chronic etoh consumption, has not been eating, feels nauseated when he eats, but not while drinking etoh  Patient is a 24-year-old male coming in today with multiple complaints  In review of patient's chart patient has a history of GI bleed, esophageal varices, hepatitis c, hyperlipidemia as well as hypertension  Patient states that he has not seen his PCP for month ever since "he bailed and retired and I do not have a new doctor"  Patient states for the past several days he has been having syncopal events multiple times a day for 3-4 days  He states he lives alone so no one is there to see this  Today he states he went to stand up out of bed passed out, and "broke my glasses"  He does not remember if he has out for long periods of time or not  He cannot tell me at this time if he had any preceding events  Patient states he did have several beers tonight  Patient reports that he has been having decreased p o  Intake for several weeks  He has been able to tolerate alcohol  He has no hemoptysis, hematemesis, melena  He does state that he has been having multiple episodes of diarrhea  He states he lives alone and cannot care for himself  He has no chest pain, shortness of breath, dyspnea on exertion        History provided by:  Patient and EMS personnel  Dizziness   Quality:  Unable to specify  Severity:  Unable to specify  Onset quality:  Gradual  Timing:  Intermittent  Progression:  Waxing and waning  Chronicity:  New  Context: standing up    Relieved by:  Nothing  Worsened by:  Nothing  Ineffective treatments:  None tried  Associated symptoms: diarrhea, nausea, syncope and weakness    Associated symptoms: no blood in stool, no chest pain, no headaches, no hearing loss, no palpitations, no shortness of breath, no tinnitus, no vision changes and no vomiting    Diarrhea: Quality:  Unable to specify    Severity:  Moderate    Timing:  Intermittent    Progression:  Unchanged  Nausea:     Severity:  Mild    Onset quality:  Gradual    Timing:  Intermittent    Progression:  Waxing and waning  Syncope: Witnessed: no      Suspicion of head trauma:  Unable to specify  Risk factors: no anemia, no heart disease, no hx of stroke, no hx of vertigo, no Meniere's disease, no multiple medications and no new medications        Prior to Admission Medications   Prescriptions Last Dose Informant Patient Reported? Taking?   lisinopril-hydrochlorothiazide (PRINZIDE,ZESTORETIC) 10-12 5 MG per tablet   Yes No   Sig: Take 1 tablet by mouth daily   traZODone (DESYREL) 100 mg tablet   Yes Yes   Sig: Take 150 mg by mouth daily at bedtime      Facility-Administered Medications: None       Past Medical History:   Diagnosis Date    Esophageal varices ([address] )     Diagnosed in [facility] in 2018    GI bleed     2018    Hepatitis C     Hyperlipidemia     Hypertension        History reviewed  No pertinent surgical history  History reviewed  No pertinent family history  I have reviewed and agree with the history as documented  Social History     Tobacco Use    Smoking status: Current Every Day Smoker     Packs/day: 0 50   Substance Use Topics    Alcohol use: Yes    Drug use: No        Review of Systems   Constitutional: Negative for diaphoresis and fever  HENT: Negative for ear pain, hearing loss, sore throat and tinnitus  Eyes: Negative for visual disturbance  Respiratory: Negative for chest tightness and shortness of breath  Cardiovascular: Positive for syncope  Negative for chest pain and palpitations  Gastrointestinal: Positive for diarrhea and nausea  Negative for abdominal pain, blood in stool and vomiting  Genitourinary: Negative for difficulty urinating and dysuria  Musculoskeletal: Negative for back pain and neck pain  Skin: Negative for rash     Neurological: Positive for dizziness and weakness  Negative for headaches  Falls     Psychiatric/Behavioral: Negative for confusion  All other systems reviewed and are negative  Physical Exam  Physical Exam   Constitutional: He is oriented to person, place, and time  He appears well-developed  No distress  Patient appears older than stated age  There is alcohol on breath  HENT:   Head: Normocephalic and atraumatic  Right Ear: Hearing and external ear normal    Left Ear: Hearing and external ear normal    Nose: Nose normal    Mouth/Throat: Uvula is midline  Dry mucous membranes  Patient maintaining airway maintaining secretions patient with uvula midline without edema  No brawniness under the tongue   Eyes: Pupils are equal, round, and reactive to light  Conjunctivae, EOM and lids are normal    Neck: Normal range of motion  Neck supple  Full active range of motion   Cardiovascular: Normal rate, regular rhythm, normal heart sounds and intact distal pulses  No murmur heard  Pulses:       Radial pulses are 2+ on the right side, and 2+ on the left side  Dorsalis pedis pulses are 2+ on the right side, and 2+ on the left side  Pulmonary/Chest: Effort normal  No stridor  No respiratory distress  He has wheezes in the right lower field and the left lower field  Abdominal: Soft  Bowel sounds are normal  He exhibits no distension  There is no tenderness  There is no rigidity, no rebound, no guarding, no CVA tenderness, no tenderness at McBurney's point and negative Keen's sign  Genitourinary: Rectal exam shows external hemorrhoid  Rectal exam shows no internal hemorrhoid, no fissure, anal tone normal and guaiac negative stool  Musculoskeletal: Normal range of motion  He exhibits no edema          Cervical back: Normal         Thoracic back: Normal         Lumbar back: Normal         Legs:  Patient has full active range of motion of the bilateral upper extremities and lower extremities independently and pain-free  Manual muscle grade 5/5  Neurological: He is alert and oriented to person, place, and time  He has normal strength  He displays no atrophy and no tremor  No cranial nerve deficit or sensory deficit  He exhibits normal muscle tone  He displays no seizure activity  GCS eye subscore is 4  GCS verbal subscore is 5  GCS motor subscore is 6  Reflex Scores:       Patellar reflexes are 2+ on the right side and 2+ on the left side  Achilles reflexes are 2+ on the right side and 2+ on the left side  Negative pronator drift  No slurred speech, no facial droop   Skin: Skin is warm  Capillary refill takes less than 2 seconds  He is not diaphoretic  Psychiatric:   Tearful on exam   Nursing note and vitals reviewed        Vital Signs  ED Triage Vitals   Temperature Pulse Respirations Blood Pressure SpO2   04/10/19 0124 04/10/19 0124 04/10/19 0145 04/10/19 0124 04/10/19 0124   98 3 °F (36 8 °C) 79 (!) 24 90/62 (!) 88 %      Temp src Heart Rate Source Patient Position - Orthostatic VS BP Location FiO2 (%)   -- 04/10/19 0124 04/10/19 0145 04/10/19 0124 --    Monitor Lying Right arm       Pain Score       04/10/19 0124       Worst Possible Pain           Vitals:    04/10/19 0230 04/10/19 0250 04/10/19 0300 04/10/19 0315   BP: 97/56 92/57 90/56 (!) 88/58   Pulse: 73 75 74 75   Patient Position - Orthostatic VS: Lying Lying Lying Lying         Visual Acuity      ED Medications  Medications   sodium chloride 0 9 % infusion (150 mL/hr Intravenous New Bag 4/10/19 0253)   acetaminophen (TYLENOL) tablet 650 mg (650 mg Oral Not Given 4/10/19 0306)   lidocaine (LIDODERM) 5 % patch 1 patch (1 patch Topical Medication Applied 4/10/19 0302)   LORazepam (ATIVAN) 2 mg/mL injection 1 mg (has no administration in time range)   sodium chloride 0 9 % bolus 1,000 mL (1,000 mL Intravenous New Bag 4/10/19 0320)   sodium chloride 0 9 % bolus 1,000 mL (0 mL Intravenous Stopped 4/10/19 0205)   ondansetron (FOR EMS ONLY) (ZOFRAN) 4 mg/2 mL injection 4 mg (0 mg Does not apply Given to EMS 4/10/19 0135)   sodium chloride 0 9 % bolus 1,000 mL (0 mL Intravenous Stopped 4/10/19 0207)   potassium chloride 10 % oral solution 40 mEq (40 mEq Oral Given 4/10/19 0228)   sodium chloride 0 9 % bolus 1,000 mL (0 mL Intravenous Stopped 7/09/81 8881)   folic acid (FOLVITE) tablet 1 mg (1 mg Oral Given 4/10/19 0309)   multivitamin-minerals (CENTRUM) tablet 1 tablet (1 tablet Oral Given 4/10/19 0309)   thiamine (VITAMIN B1) tablet 100 mg (100 mg Oral Given 4/10/19 0309)   methocarbamol (ROBAXIN) tablet 500 mg (500 mg Oral Given 4/10/19 0316)       Diagnostic Studies  Results Reviewed     Procedure Component Value Units Date/Time    UA w Reflex to Microscopic [[id number]] Collected:  04/10/19 0327    Lab Status:  No result Specimen:  Urine, Clean Catch     Rapid drug screen, urine [[id number]] Collected:  04/10/19 0327    Lab Status:  No result Specimen:  Urine, Clean Catch     Osmolality, urine [[id number]] Collected:  04/10/19 0327    Lab Status:  No result Specimen:  Urine, Clean Catch     Acetaminophen level-"If concentration is detectable, please discuss with medical  on call " [[id number]]  (Abnormal) Collected:  04/10/19 0134    Lab Status:  Final result Specimen:  Blood from Arm, Left Updated:  04/10/19 0306     Acetaminophen Level <2 ug/mL     Salicylate level [[id number]]  (Normal) Collected:  04/10/19 0143    Lab Status:  Final result Specimen:  Blood from Arm, Left Updated:  53/91/12 6418     Salicylate Lvl 3 9 mg/dL     CBC and differential [[id number]]  (Abnormal) Collected:  04/10/19 0134    Lab Status:  Final result Specimen:  Blood from Arm, Left Updated:  04/10/19 0248     WBC 6 62 Thousand/uL      RBC 4 35 Million/uL      Hemoglobin 13 1 g/dL      Hematocrit 40 1 %      MCV 92 fL      MCH 30 1 pg      MCHC 32 7 g/dL      RDW 17 3 %      MPV 10 7 fL      Platelets 64 Thousands/uL      nRBC 0 /100 WBCs      Neutrophils Relative 62 %      Immat GRANS % 1 %      Lymphocytes Relative 21 %      Monocytes Relative 14 %      Eosinophils Relative 1 %      Basophils Relative 1 %      Neutrophils Absolute 4 19 Thousands/µL      Immature Grans Absolute 0 03 Thousand/uL      Lymphocytes Absolute 1 38 Thousands/µL      Monocytes Absolute 0 91 Thousand/µL      Eosinophils Absolute 0 07 Thousand/µL      Basophils Absolute 0 04 Thousands/µL     Narrative: This is an appended report  These results have been appended to a previously verified report  Osmolality-"If this is regarding a toxic alcohol, please STOP and consult medical  for further guidance " [[id number]] Collected:  04/10/19 0134    Lab Status:   In process Specimen:  Blood from Arm, Left Updated:  04/10/19 0232    Troponin I [[id number]]     Lab Status:  No result Specimen:  Blood     Lipase [[id number]]  (Abnormal) Collected:  04/10/19 0134    Lab Status:  Final result Specimen:  Blood from Arm, Left Updated:  04/10/19 0215     Lipase 420 u/L     B-type natriuretic peptide [[id number]]  (Abnormal) Collected:  04/10/19 0134    Lab Status:  Final result Specimen:  Blood from Arm, Left Updated:  04/10/19 0215     NT-proBNP 425 pg/mL     Magnesium [[id number]]  (Normal) Collected:  04/10/19 0134    Lab Status:  Final result Specimen:  Blood from Arm, Left Updated:  04/10/19 0215     Magnesium 1 9 mg/dL     Phosphorus [[id number]]  (Abnormal) Collected:  04/10/19 0134    Lab Status:  Final result Specimen:  Blood from Arm, Left Updated:  04/10/19 0215     Phosphorus 5 0 mg/dL     CKMB [[id number]]  (Normal) Collected:  04/10/19 0134    Lab Status:  Final result Specimen:  Blood from Arm, Left Updated:  04/10/19 0215     CK-MB Index 1 1 %      CK-MB 4 8 ng/mL     CK Total with Reflex CKMB [[id number]]  (Abnormal) Collected:  04/10/19 0134    Lab Status:  Final result Specimen:  Blood from Arm, Left Updated:  04/10/19 0214     Total  U/L     Lactic acid, plasma [[id number]]  (Abnormal) Collected:  04/10/19 0134    Lab Status:  Final result Specimen:  Blood from Arm, Left Updated:  04/10/19 0202     LACTIC ACID 2 4 mmol/L     Narrative:       Result may be elevated if tourniquet was used during collection      Troponin I [[id number]]  (Normal) Collected:  04/10/19 0134    Lab Status:  Final result Specimen:  Blood from Arm, Left Updated:  04/10/19 0159     Troponin I 0 02 ng/mL     Comprehensive metabolic panel [[id number]]  (Abnormal) Collected:  04/10/19 0134    Lab Status:  Final result Specimen:  Blood from Arm, Left Updated:  04/10/19 0158     Sodium 134 mmol/L      Potassium 3 2 mmol/L      Chloride 95 mmol/L      CO2 24 mmol/L      ANION GAP 15 mmol/L      BUN 36 mg/dL      Creatinine 2 58 mg/dL      Glucose 103 mg/dL      Calcium 8 6 mg/dL      AST 55 U/L      ALT 34 U/L      Alkaline Phosphatase 90 U/L      Total Protein 7 0 g/dL      Albumin 3 3 g/dL      Total Bilirubin 1 60 mg/dL      eGFR 26 ml/min/1 73sq m     Narrative:       [facility] guidelines for Chronic Kidney Disease (CKD):     Stage 1 with normal or high GFR (GFR > 90 mL/min/1 73 square meters)    Stage 2 Mild CKD (GFR = 60-89 mL/min/1 73 square meters)    Stage 3A Moderate CKD (GFR = 45-59 mL/min/1 73 square meters)    Stage 3B Moderate CKD (GFR = 30-44 mL/min/1 73 square meters)    Stage 4 Severe CKD (GFR = 15-29 mL/min/1 73 square meters)    Stage 5 End Stage CKD (GFR <15 mL/min/1 73 square meters)  Note: GFR calculation is accurate only with a steady state creatinine    Protime-INR [[id number]]  (Abnormal) Collected:  04/10/19 0134    Lab Status:  Final result Specimen:  Blood from Arm, Left Updated:  04/10/19 0157     Protime 14 7 seconds      INR 1 21    APTT [[id number]]  (Normal) Collected:  04/10/19 0134    Lab Status:  Final result Specimen:  Blood from Arm, Left Updated:  04/10/19 0157     PTT 33 seconds     Ethanol [[id number]]  (Abnormal) Collected:  04/10/19 0137 Lab Status:  Final result Specimen:  Blood from Arm, Left Updated:  04/10/19 0153     Ethanol Lvl 267 mg/dL     Procalcitonin [[id number]] Collected:  04/10/19 0134    Lab Status: In process Specimen:  Blood from Arm, Left Updated:  04/10/19 0139    Fingerstick Glucose (POCT) [[id number]]  (Normal) Collected:  04/10/19 0136    Lab Status:  Final result Updated:  04/10/19 0137     POC Glucose 102 mg/dl                  CT chest abdomen pelvis wo contrast   Final Result by Franki Armenta DO (04/10 0300)      Cirrhotic liver with portal hypertension  Small pulmonary nodule in the left upper lobe for which recommendations as described above  Workstation performed: [id number]         CT head without contrast   Final Result by Chris Sheldon DO (04/10 0249)      Small left posterior scalp hematoma suspected but no calvarial fracture or acute intracranial process is seen  Other findings as above  Workstation performed: [id number]         CT spine cervical without contrast   Final Result by Franki Armenta DO (04/10 0245)      No cervical spine fracture or traumatic malalignment                     Workstation performed: [id number]                    Procedures  FAST Ultrasound  Performed by: Nicola Calderón DO  Authorized by: Nicola Calderón DO     Procedure date/time:  4/10/2019 1:51 AM  Patient location:  ED  Procedure details:     Indications: blunt abdominal trauma      Assess for:  Intra-abdominal fluid and pericardial effusion    Technique:  Abdominal and cardiac  Abdominal findings:     L kidney:  Visualized    R kidney:  Visualized    Liver:  Visualized    Bladder:  Visualized    Hepatorenal space visualized: identified      Splenorenal space: identified      Rectovesical free fluid: not identified      Splenorenal free fluid: not identified      Pouch of Mason free fluid: not identified    Cardiac findings:     Heart:  Visualized    Wall motion: identified Pericardial effusion: not identified             Phone Contacts  ED Phone Contact    ED Course         HEART Risk Score      Most Recent Value   History  0 Filed at: 04/10/2019 0215   ECG  1 Filed at: 04/10/2019 0215   Age  1 Filed at: 04/10/2019 0215   Risk Factors  2 Filed at: 04/10/2019 0215   Troponin  0 Filed at: 04/10/2019 0215   Heart Score Risk Calculator   History  0 Filed at: 04/10/2019 0215   ECG  1 Filed at: 04/10/2019 0215   Age  1 Filed at: 04/10/2019 0215   Risk Factors  2 Filed at: 04/10/2019 0215   Troponin  0 Filed at: 04/10/2019 0215   HEART Score  4 Filed at: 04/10/2019 0215   HEART Score  4 Filed at: 04/10/2019 0215                            MDM  Number of Diagnoses or Management Options  BHARAT (acute kidney injury) ([facility] [address] ):   Alcohol intoxication ([facility] [address] ):   Dehydration:   Hematoma of scalp, initial encounter:   Hypokalemia:   Hypotension:   Syncope: Thrombocytopenia [facility]):   Diagnosis management comments: Patient is a 68-year-old male coming in today with multiple complaints  He has mildly hypotensive and hypoxic on room air  Will place 2 L and give 2 L IV fluid bolus  Will check septic workup as well as trauma workup of pan scans of CT head, C-spine of chest abdomen pelvis  Will also include a syncopal cardiac workup  EKG INTERPRETATION 1:25 a m  RHYTHM:  Normal sinus rhythm at 76 beats per minute  AXIS:  Normal axis  INTERVALS:  CO interval measured at 178 milliseconds  QRS COMPLEX:  QRS measured at 108 milliseconds  ST SEGMENT:  Nonspecific ST segment changes  Poor R-wave progression  Low-voltage  QT INTERVAL:  QTC measured at 495 millisecond  COMPARED WITH PRIOR   [facility]   Interpretation by Olivia Liao, DO    Differential diagnosis includes but not limited to, intracranial hemorrhage, traumatic fracture of spine, GIB, liver lacerations, melena splenic laceration, kidney injury, alcohol-induced hepatitis, electrolyte dysfunction, acute kidney injury, NSTEMI, acute coronary syndrome, PE, vasovagal, arrhythmia, drug-induced, hypovolemic shock due to traumatic injury or dehydration, valvular disorder, ischemic cardiomyopathy    1:48 AM  Called into room as patient's blood pressure is below 80  Patient will be laid flat, 2 IVs as well as 2-3 L IV fluid  Will perform ultrasound as well as rectal exam   Patient states that he was having 3 days of bright red blood per rectum several episodes a day  It stopped today  2:02 AM  Receive labs  Creatinine is markedly increased from 0 6 to a greater than 2 4  Will change CT scans to without contrast   Will give 3rd L IV fluid  Also monitor urine output  Lactate is mildly elevated at 2 4  Will repeat after IV fluids  CBC is stable except for platelets which are chronically low which is consistent with hepatitis and thrombocytopenia  Mildly elevated INR again from cirrhosis  Potassium is low at 3 2 and will replace  Patient's last echo was at [facility] in 2018 with a normal EF    2:40 AM  Patient's blood pressure has improved to greater than 90 systolic  Will start maintenance fluids at 150 cc an hour  In review of patient's old chart patient has pressure typically over 120    3:06 AM  CTs without acute pathology  He does have lung nodule which is stable  After 3 L patient still remains any react  Will require admission as well as continued troponins  His syncopal and weakness are primarily due to dehydration and acute renal failure  3:16 AM  Patient updated on labs, CT's and need for admission  Discussed with Myesha from [address] [place] [address]  Agree patient remains hypotensive ;however aaox3 will need ICU for his ARF, dehydration and CIWA     3:29 AM  Patient urinated approximately 600 cc  Osmols sent  Portions of the record may have been created with voice recognition software  Occasional wrong word or "sound a like" substitutions may have occurred due to the inherent limitations of voice recognition software   Read the chart carefully and recognize, using context, where substitutions have occurred  Amount and/or Complexity of Data Reviewed  Clinical lab tests: ordered and reviewed  Tests in the radiology section of CPT®: ordered and reviewed  Tests in the medicine section of CPT®: ordered and reviewed  Independent visualization of images, tracings, or specimens: yes        Disposition  Final diagnoses:   Syncope   Thrombocytopenia ([facility] )   BHARAT (acute kidney injury) ([facility] )   Hypokalemia   Dehydration   Alcohol intoxication ([facility] )   Hypotension   Hematoma of scalp, initial encounter     Time reflects when diagnosis was documented in both MDM as applicable and the Disposition within this note     Time User Action Codes Description Comment    4/10/2019  1:56 AM Bendock, Diamond L Add [R55] Syncope     4/10/2019  1:56 AM Bendock, Diamond L Add [D69 6] Thrombocytopenia ([facility] )     4/10/2019  2:03 AM Bendock, Krystal Saw L Add [N17 9] BHARAT (acute kidney injury) ([place] [address] )     4/10/2019  2:03 AM Bendock, Diamond L Add [E87 6] Hypokalemia     4/10/2019  2:03 AM Bendock, Diamond L Add [E86 0] Dehydration     4/10/2019  2:06 AM Bendock, Diamond L Add [F10 929] Alcohol intoxication ([place] [address] )     4/10/2019  2:41 AM Bendock, Krystal Saw L Add [I95 9] Hypotension     4/10/2019  2:53 AM Bendock, Krystal Saw L Add [S00 03XA] Hematoma of scalp, initial encounter       ED Disposition     ED Disposition Condition Date/Time Comment    Admit Stable Wed Apr 10, 2019  3:06 AM Case was discussed with Nohelia De Paz and the patient's admission status was agreed to be Admission Status: inpatient status to the service of Dr Federico Streeter   Follow-up Information    None         Patient's Medications   Discharge Prescriptions    No medications on file     No discharge procedures on file      ED Provider  Electronically Signed by           Addy Brennan DO  [facility]/19 [facility]  04/10/19 5585

## 2019-04-10 NOTE — SOCIAL WORK
Met with pt to discuss role as  in helping pt to develop discharge plan and to help pt carry out their plan  Pt lives in an apartment alone   Pt has a walker which he uses to ambulate when he is weak  Pt does the cooking  Pt does not drive he uses public transportation  Pt has had home care in the past with [facility]  Pt needs a PCP and he is agreeable to me setting him up with a PCP in the [facility] in [facility]  Pt uses the [facility] in [facility]  Patient/caregiver received discharge checklist   Content reviewed  Patient/caregiver encouraged to participate in discharge plan of care prior to discharge home  Pt was given the Meds to [facility] paper  Pt might be interested in that on discharge

## 2019-04-10 NOTE — ED NOTES
Patient transported to CT with RN on monitor     [address] [facility] [place], IDANIA  04/10/19 1504

## 2019-04-10 NOTE — ASSESSMENT & PLAN NOTE
Platelet count 64  Known history of cirrhosis of the liver and portal hypertension and esophageal varices  Recommend GI consult  Trend CBC   Hemoglobin stable at 13 1

## 2019-04-10 NOTE — ASSESSMENT & PLAN NOTE
Lactic 2 4 on admission  Was bolus for L normal saline in emergency room  Continue normal saline 150 mL/hour  Trend lactic acid q 2 hours until normalization  Thiamine 100 mg ordered

## 2019-04-10 NOTE — PLAN OF CARE
Problem: Potential for Falls  Goal: Patient will remain free of falls  Description  INTERVENTIONS:  - Assess patient frequently for physical needs  -  Identify cognitive and physical deficits and behaviors that affect risk of falls    -  Goodman fall precautions as indicated by assessment   - Educate patient/family on patient safety including physical limitations  - Instruct patient to call for assistance with activity based on assessment  - Modify environment to reduce risk of injury  - Consider OT/PT consult to assist with strengthening/mobility  Outcome: Progressing     Problem: PAIN - ADULT  Goal: Verbalizes/displays adequate comfort level or baseline comfort level  Description  Interventions:  - Encourage patient to monitor pain and request assistance  - Assess pain using appropriate pain scale  - Administer analgesics based on type and severity of pain and evaluate response  - Implement non-pharmacological measures as appropriate and evaluate response  - Consider cultural and social influences on pain and pain management  - Notify physician/advanced practitioner if interventions unsuccessful or patient reports new pain  Outcome: Progressing     Problem: INFECTION - ADULT  Goal: Absence or prevention of progression during hospitalization  Description  INTERVENTIONS:  - Assess and monitor for signs and symptoms of infection  - Monitor lab/diagnostic results  - Monitor all insertion sites, i e  indwelling lines, tubes, and drains  - Monitor endotracheal (as able) and nasal secretions for changes in amount and color  - Goodman appropriate cooling/warming therapies per order  - Administer medications as ordered  - Instruct and encourage patient and family to use good hand hygiene technique  - Identify and instruct in appropriate isolation precautions for identified infection/condition  Outcome: Progressing  Goal: Absence of fever/infection during neutropenic period  Description  INTERVENTIONS:  - Monitor WBC  - Implement neutropenic guidelines  Outcome: Progressing     Problem: SAFETY ADULT  Goal: Maintain or return to baseline ADL function  Description  INTERVENTIONS:  -  Assess patient's ability to carry out ADLs; assess patient's baseline for ADL function and identify physical deficits which impact ability to perform ADLs (bathing, care of mouth/teeth, toileting, grooming, dressing, etc )  - Assess/evaluate cause of self-care deficits   - Assess range of motion  - Assess patient's mobility; develop plan if impaired  - Assess patient's need for assistive devices and provide as appropriate  - Encourage maximum independence but intervene and supervise when necessary  ¯ Involve family in performance of ADLs  ¯ Assess for home care needs following discharge   ¯ Request OT consult to assist with ADL evaluation and planning for discharge  ¯ Provide patient education as appropriate  Outcome: Progressing  Goal: Maintain or return mobility status to optimal level  Description  INTERVENTIONS:  - Assess patient's baseline mobility status (ambulation, transfers, stairs, etc )    - Identify cognitive and physical deficits and behaviors that affect mobility  - Identify mobility aids required to assist with transfers and/or ambulation (gait belt, sit-to-stand, lift, walker, cane, etc )  - [place] fall precautions as indicated by assessment  - Record patient progress and toleration of activity level on Mobility SBAR; progress patient to next Phase/Stage  - Instruct patient to call for assistance with activity based on assessment  - Request Rehabilitation consult to assist with strengthening/weightbearing, etc   Outcome: Progressing     Problem: DISCHARGE PLANNING  Goal: Discharge to home or other facility with appropriate resources  Description  INTERVENTIONS:  - Identify barriers to discharge w/patient and caregiver  - Arrange for needed discharge resources and transportation as appropriate  - Identify discharge learning needs (meds, wound care, etc )  - Arrange for interpretive services to assist at discharge as needed  - Refer to Case Management Department for coordinating discharge planning if the patient needs post-hospital services based on physician/advanced practitioner order or complex needs related to functional status, cognitive ability, or social support system  Outcome: Progressing     Problem: Knowledge Deficit  Goal: Patient/family/caregiver demonstrates understanding of disease process, treatment plan, medications, and discharge instructions  Description  Complete learning assessment and assess knowledge base    Interventions:  - Provide teaching at level of understanding  - Provide teaching via preferred learning methods  Outcome: Progressing     Problem: METABOLIC, FLUID AND ELECTROLYTES - ADULT  Goal: Electrolytes maintained within normal limits  Description  INTERVENTIONS:  - Monitor labs and assess patient for signs and symptoms of electrolyte imbalances  - Administer electrolyte replacement as ordered  - Monitor response to electrolyte replacements, including repeat lab results as appropriate  - Instruct patient on fluid and nutrition as appropriate  Outcome: Progressing  Goal: Fluid balance maintained  Description  INTERVENTIONS:  - Monitor labs and assess for signs and symptoms of volume excess or deficit  - Monitor I/O and WT  - Instruct patient on fluid and nutrition as appropriate  Outcome: Progressing

## 2019-04-10 NOTE — PLAN OF CARE
Recommend strawberry ensure enlive once daily  If Phosphorus does not improved consider Lo Phos diet  Problem: Nutrition/Hydration-ADULT  Goal: Nutrient/Hydration intake appropriate for improving, restoring or maintaining nutritional needs  Description  Monitor and assess patient's nutrition/hydration status for malnutrition (ex- brittle hair, bruises, dry skin, pale skin and conjunctiva, muscle wasting, smooth red tongue, and disorientation)  Collaborate with interdisciplinary team and initiate plan and interventions as ordered  Monitor patient's weight and dietary intake as ordered or per policy  Utilize nutrition screening tool and intervene per policy  Determine patient's food preferences and provide high-protein, high-caloric foods as appropriate       INTERVENTIONS:  - Monitor oral intake, urinary output, labs, and treatment plans  - Assess nutrition and hydration status and recommend course of action  - Evaluate amount of meals eaten  - Assist patient with eating if necessary   - Allow adequate time for meals  - Recommend/ encourage appropriate diets, oral nutritional supplements, and vitamin/mineral supplements  - Order, calculate, and assess calorie counts as needed  - Recommend, monitor, and adjust tube feedings and TPN/PPN based on assessed needs  - Assess need for intravenous fluids  - Provide specific nutrition/hydration education as appropriate  - Include patient/family/caregiver in decisions related to nutrition  Outcome: Progressing

## 2019-04-10 NOTE — ASSESSMENT & PLAN NOTE
Patient reports multiple syncopal events-admits to last 1 being this morning admits to loss of consciousness  4/10/19-CTA head-"IMPRESSION:-Small left posterior scalp hematoma suspected but no calvarial fracture or acute intracranial process is seen "  Ambulate with assistance  Admit to step-down- continuous cardiopulmonary monitoring  CIWA protocol in place  Elevated ETOH level 267

## 2019-04-10 NOTE — ASSESSMENT & PLAN NOTE
ETOH level 267  CIWA  protocol place  Continue normal saline 150 an hour  Ordered thiamine-folic [id number] ordered

## 2019-04-11 VITALS
HEIGHT: 72 IN | OXYGEN SATURATION: 97 % | WEIGHT: 210.1 LBS | HEART RATE: 64 BPM | TEMPERATURE: 97.6 F | DIASTOLIC BLOOD PRESSURE: 110 MMHG | BODY MASS INDEX: 28.46 KG/M2 | RESPIRATION RATE: 20 BRPM | SYSTOLIC BLOOD PRESSURE: 153 MMHG

## 2019-04-11 LAB
ANION GAP SERPL CALCULATED.3IONS-SCNC: 8 MMOL/L (ref 4–13)
BASOPHILS # BLD AUTO: 0.02 THOUSANDS/ΜL (ref 0–0.1)
BASOPHILS NFR BLD AUTO: 1 % (ref 0–1)
BUN SERPL-MCNC: 22 MG/DL (ref 5–25)
CALCIUM SERPL-MCNC: 8.3 MG/DL (ref 8.3–10.1)
CHLORIDE SERPL-SCNC: 103 MMOL/L (ref 100–108)
CO2 SERPL-SCNC: 28 MMOL/L (ref 21–32)
CREAT SERPL-MCNC: 0.94 MG/DL (ref 0.6–1.3)
EOSINOPHIL # BLD AUTO: 0.05 THOUSAND/ΜL (ref 0–0.61)
EOSINOPHIL NFR BLD AUTO: 2 % (ref 0–6)
ERYTHROCYTE [DISTWIDTH] IN BLOOD BY AUTOMATED COUNT: 16.7 % (ref 11.6–15.1)
GFR SERPL CREATININE-BSD FRML MDRD: 90 ML/MIN/1.73SQ M
GLUCOSE SERPL-MCNC: 88 MG/DL (ref 65–140)
HAV IGM SER QL: ABNORMAL
HBV CORE IGM SER QL: ABNORMAL
HBV SURFACE AG SER QL: ABNORMAL
HCT VFR BLD AUTO: 39.9 % (ref 36.5–49.3)
HCV AB SER QL: ABNORMAL
HGB BLD-MCNC: 12.5 G/DL (ref 12–17)
IMM GRANULOCYTES # BLD AUTO: 0.04 THOUSAND/UL (ref 0–0.2)
IMM GRANULOCYTES NFR BLD AUTO: 1 % (ref 0–2)
LYMPHOCYTES # BLD AUTO: 0.53 THOUSANDS/ΜL (ref 0.6–4.47)
LYMPHOCYTES NFR BLD AUTO: 17 % (ref 14–44)
MCH RBC QN AUTO: 29.6 PG (ref 26.8–34.3)
MCHC RBC AUTO-ENTMCNC: 31.3 G/DL (ref 31.4–37.4)
MCV RBC AUTO: 94 FL (ref 82–98)
MONOCYTES # BLD AUTO: 0.25 THOUSAND/ΜL (ref 0.17–1.22)
MONOCYTES NFR BLD AUTO: 8 % (ref 4–12)
NEUTROPHILS # BLD AUTO: 2.21 THOUSANDS/ΜL (ref 1.85–7.62)
NEUTS SEG NFR BLD AUTO: 71 % (ref 43–75)
NRBC BLD AUTO-RTO: 0 /100 WBCS
PLATELET # BLD AUTO: 41 THOUSANDS/UL (ref 149–390)
PMV BLD AUTO: 9.6 FL (ref 8.9–12.7)
POTASSIUM SERPL-SCNC: 3.9 MMOL/L (ref 3.5–5.3)
RBC # BLD AUTO: 4.23 MILLION/UL (ref 3.88–5.62)
SODIUM SERPL-SCNC: 139 MMOL/L (ref 136–145)
WBC # BLD AUTO: 3.1 THOUSAND/UL (ref 4.31–10.16)

## 2019-04-11 PROCEDURE — 85025 COMPLETE CBC W/AUTO DIFF WBC: CPT | Performed by: FAMILY MEDICINE

## 2019-04-11 PROCEDURE — 80074 ACUTE HEPATITIS PANEL: CPT | Performed by: INTERNAL MEDICINE

## 2019-04-11 PROCEDURE — 80048 BASIC METABOLIC PNL TOTAL CA: CPT | Performed by: FAMILY MEDICINE

## 2019-04-11 PROCEDURE — 99239 HOSP IP/OBS DSCHRG MGMT >30: CPT | Performed by: FAMILY MEDICINE

## 2019-04-11 RX ADMIN — VITAM B12 100 MCG: 100 TAB at 08:09

## 2019-04-11 RX ADMIN — LORAZEPAM 0.5 MG: 0.5 TABLET ORAL at 08:02

## 2019-04-11 RX ADMIN — NICOTINE 1 PATCH: 21 PATCH TRANSDERMAL at 08:03

## 2019-04-11 RX ADMIN — FOLIC ACID 1 MG: 1 TABLET ORAL at 08:02

## 2019-04-11 RX ADMIN — Medication 100 MG: at 08:02

## 2019-04-11 NOTE — DISCHARGE SUMMARY
Discharge Summary - Jagruti Bonilla 64 y o  male MRN: [id number]    Unit/Bed#:  Encounter: [id number]    Admission Date:   Admission Orders (From admission, onward)    Ordered        04/10/19 0325  Inpatient Admission  Once     Order ID Start Status   [id number] 04/10/19 0320 Completed                Admitting Diagnosis: Dehydration [E86 0]  Hypokalemia [E87 6]  Alcohol intoxication ([facility] ) [F10 929]  Syncope [R55]  Thrombocytopenia (HCC) [D69 6]  Hypotension [I95 9]  BHARAT (acute kidney injury) ([facility] ) [N17 9]  Hematoma of scalp, initial encounter [S00 03XA]    HPI: Jagruti Bonilla is a 64 y o  male who presented to the emergency room for evaluation of syncopal event this evening  Patient reports multiple syncopal events over the past couple weeks  Patient also admits to decreased intake  Patient admits to positive loss of consciousness, lightheadedness, convulsions, admits to diarrhea denies vomiting denies nausea denies vision change denies chest pain denies shortness of breath denies dysuria urgency or frequency denies hematochezia or melena  Patient does admit to have an alcoholic beverage assisted reports only having 3 beers even though his ETOH level was 267  Images and labs were collected in the emergency room-see below  Patient does have a significant past medical history which was retrieved from [facility] revealing a Hold views liver cirrhosis portal hypertension esophageal varices, hypertension, history of lower GI bleed, ascites  Patient was admitted by emergency room doctor for alcohol intoxication, hypokalemia, syncope patient was admitted to step-down  Procedures Performed:   Orders Placed This Encounter   Procedures    Fast Ultrasound       Summary of Hospital Course:     Acute kidney injury  Secondary to pre renal azotemia in the setting of alcohol abuse    Patient received 4 L of normal saline in the emergency room and was started on normal saline infusion at 150 cc/hour  Renal function improved and ultimately renal failure resolved prior to discharge  Alcohol abuse  Skin thrombocytopenia  Alcoholic liver cirrhosis    The patient readily admits to drinking upwards of (6) 24 oz cans of beer a day  He was initiated on CIWA protocol and ultimately initiated on Ativan 0 5 mg 3 times a day  He did show interest in alcohol cessation and case management was consulted for assistance with outpatient alcohol rehabilitation  I did elect to refer him to [facility] on discharge and have asked case management to set up an appointment    Significant Findings, Care, Treatment and Services Provided:       Cirrhotic liver with portal hypertension  Small pulmonary nodule in the left upper lobe for which recommendations as described above  Complications:  None    Discharge Diagnosis:  See above    Resolved Problems  Date Reviewed: 4/10/2019    None          Condition at Discharge: fair         Discharge instructions/Information to patient and family:   See after visit summary for information provided to patient and family  Provisions for Follow-Up Care:  See after visit summary for information related to follow-up care and any pertinent home health orders  PCP: Cam Gama MD    Disposition: Home    Planned Readmission: No    Discharge Statement   I spent 40 minutes discharging the patient  This time was spent on the day of discharge  I had direct contact with the patient on the day of discharge  Additional documentation is required if more than 30 minutes were spent on discharge  Discharge Medications:  See after visit summary for reconciled discharge medications provided to patient and family

## 2019-04-11 NOTE — UTILIZATION REVIEW
Initial Clinical Review    Admission: Date/Time/Statement: 4/10/19 @ 0320   Orders Placed This Encounter   Procedures    Inpatient Admission     Standing Status:   Standing     Number of Occurrences:   1     Order Specific Question:   Admitting Physician     Answer:   Melanie Donovan     Order Specific Question:   Level of Care     Answer:   Level 1 Stepdown [13]     Order Specific Question:   Estimated length of stay     Answer:   More than 2 Midnights     Order Specific Question:   Certification     Answer:   I certify that inpatient services are medically necessary for this patient for a duration of greater than two midnights  See H&P and MD Progress Notes for additional information about the patient's course of treatment  ED: Date/Time/Mode of Arrival:   ED Arrival Information     Expected Arrival Acuity Means of Arrival Escorted By Service Admission Type    - 4/10/2019 01:22 Urgent Ambulance [address] [facility] Hospitalist Urgent    Arrival Complaint    syncope        Chief Complaint:   Chief Complaint   Patient presents with    Dizziness     pt stated recent hx of dizziness and falling,  admits co chronic etoh consumption, has not been eating, feels nauseated when he eats, but not while drinking etoh  Assessment/Plan:   63 YO MALE TO ER FROM HOME C/O SYNCOPAL EPISODES MULTIPLE TIMES A DAY X 3-4 DAYS  PRESENTS WEAK, C/O HEADACHE, +DIARRHEA  HX LIVER CIRRHOSIS, PORTAL HTN, ESOPHAGEAL VARICES, HTN, GI BLEED, ASCITES  ADMITS TO DRINKING 3 BEERS PTA, ETOH ON ADMISSION 267  HYPOTENSIVE & HYPOXIC IN ER  ADMITTED TO INPATIENT STATUS & STARTED ON CIWA PROTOCOL, IVF FOR HYDRATION    ED Vital Signs:   ED Triage Vitals   Temperature Pulse Respirations Blood Pressure SpO2   04/10/19 0124 04/10/19 0124 04/10/19 0145 04/10/19 0124 04/10/19 0124   98 3 °F (36 8 °C) 79 (!) 24 90/62 (!) 88 %      Temp Source Heart Rate Source Patient Position - Orthostatic VS BP Location FiO2 (%)   04/10/19 0401 04/10/19 0124 04/10/19 0145 04/10/19 0124 --   Tympanic Monitor Lying Right arm       Pain Score       04/10/19 0124       Worst Possible Pain        Wt Readings from Last 1 Encounters:   04/11/19 95 3 kg (210 lb 1 6 oz)     Vital Signs (abnormal):   BP 74/47, 79/51, 87/50, 86/53  Pertinent Labs/Diagnostic Test Results:   WBC 3 66 HGB 11 8 PLT 46  K 3 2 CL 95 ANION GAP 15 BUN 36 CR 2 58 AST 55 ALB 3 3 TBILI 1 60 GFR 26 TOTAL  LIPASE 420 PHOS 5 1  LACTIC ACID 2 4   Osmolality Serum 282 - 298 mmol/     Osmolality, Ur 250 - 900 mmol/     Acetaminophen Level 10 - 20 ug/mL <2     Ethanol Lvl 0 - 3 mg/dL 267   CT HEAD=Small left posterior scalp hematoma suspected but no calvarial fracture or acute intracranial process is seen  CT CERVICAL SPINE=No cervical spine fracture or traumatic malalignment  CT CHEST, A/P=Cirrhotic liver with portal hypertension    Small pulmonary nodule in the left upper lobe for which recommendations as described above  EKG=Normal sinus rhythm  Low voltage QRS  Cannot rule out Anterior infarct , age undetermined  EKG=Normal sinus rhythm  Low voltage QRS  Septal infarct (cited on or before 10-APR-2019)  Prolonged QT  ED Treatment:   Medication Administration from 04/10/2019 0122 to 04/10/2019 0725       Date/Time Order Dose Route Action Action by Comments     04/10/2019 0205 sodium chloride 0 9 % bolus 1,000 mL 0 mL Intravenous Stopped Charito Lucas RN      04/10/2019 0135 sodium chloride 0 9 % bolus 1,000 mL 1,000 mL Intravenous New [address] Geovanna Ferrer RN      04/10/2019 0135 ondansetron (FOR EMS ONLY) (ZOFRAN) 4 mg/2 mL injection 4 mg 0 mg Does not apply Given to EMS Geovanna Ferrer RN      04/10/2019 0207 sodium chloride 0 9 % bolus 1,000 mL 0 mL Intravenous Stopped Charito Lucas RN      04/10/2019 0145 sodium chloride 0 9 % bolus 1,000 mL 1,000 mL Intravenous New Bag Charito Lucas RN      04/10/2019 0228 potassium chloride 10 % oral solution 40 mEq 40 mEq Oral Given [facility] IDANIA [place]      04/10/2019 0253 sodium chloride 0 9 % bolus 1,000 mL 0 mL Intravenous Stopped Charito Tam RN      04/10/2019 0205 sodium chloride 0 9 % bolus 1,000 mL 1,000 mL Intravenous New Bag Charito Tam RN      04/10/2019 0253 sodium chloride 0 9 % infusion 150 mL/hr Intravenous New [facility] Pilar Tam, [facility]      04/10/2019 6526 acetaminophen (TYLENOL) tablet 650 mg 650 mg Oral Not Given Charito Tam RN      04/10/2019 0302 lidocaine (LIDODERM) 5 % patch 1 patch 1 patch Topical Medication Applied Charito Tam RN left lower back     56/28/8213 6575 folic acid (FOLVITE) tablet 1 mg 1 mg Oral Given Charito Tam RN      04/10/2019 0309 multivitamin-minerals (CENTRUM) tablet 1 tablet 1 tablet Oral Given Charito Tam RN      04/10/2019 0309 thiamine (VITAMIN B1) tablet 100 mg 100 mg Oral Given Charito Tam RN      04/10/2019 0316 methocarbamol (ROBAXIN) tablet 500 mg 500 mg Oral Given Charito Tam RN      04/10/2019 0348 sodium chloride 0 9 % bolus 1,000 mL 0 mL Intravenous Stopped Charito Tam RN      04/10/2019 0320 sodium chloride 0 9 % bolus 1,000 mL 1,000 mL Intravenous New Bag Charito Tam RN         Past Medical/Surgical History:   Past Medical History:   Diagnosis Date    [facility]     GI bleed     Hepatitis C     Hyperlipidemia     Hypertension      Admitting Diagnosis: Dehydration [E86 0]  Hypokalemia [E87 6]  Alcohol intoxication ([facility] ) [F10 929]  Syncope [R55]  Thrombocytopenia ([facility] ) [D69 6]  Hypotension [I95 9]  BHARAT (acute kidney injury) ([facility] ) [N17 9]  Hematoma of scalp, initial encounter [S00 03XA]  Age/Sex: 64 y o  male  Admission Orders:  LEVEL 1 STEPDOWN  AQUA K PAD QID TO BACK X 20MIN  CIWA PROTOCOL  O2 TO KEEP SATS>92%  Scheduled Meds:   Current Facility-Administered Medications:  acetaminophen 650 mg Oral Once Jannette Aleman,  cyanocobalamin 100 mcg Oral Daily Myesha Y Swank, CRNP    folic acid 1 mg Oral Daily Myesha Y Swank, CRNP    lidocaine 1 patch Topical Q24H Myesha Y Swank, CRNP    LORazepam 1 mg Intravenous Q4H PRN Myesha Y Swank, CRNP    LORazepam 0 5 mg Oral Q8H Lo Ibanez MD    nicotine 1 patch Transdermal Daily Myesha Y Swank, CRNP    thiamine 100 mg Intravenous Daily KAIT Tapia Last Rate: 100 mg (04/11/19 0802)     Continuous Infusions:   sodium chloride 100 mL/hr Last Rate: Stopped (04/11/19 0825)     PRN Meds: LORazepam  Network Utilization Review Department  Phone: [phone]; Fax [phone]  [email or web address]  ATTENTION: Please call with any questions or concerns to [phone]  and carefully listen to the prompts so that you are directed to the right person  Send all requests for admission clinical reviews, approved or denied determinations and any other requests to fax [phone]   All voicemails are confidential

## 2019-04-11 NOTE — PLAN OF CARE
Pt going to be discharged, instructions reviewed, Pat CATINA gave pt information on AA meetings in immediate area per pt request  Awaiting ride from taxi service to take pt home

## 2019-04-11 NOTE — SOCIAL WORK
Pt is being discharged today  Pt will need a ride home  We will try to either get a lyft ride home or [place] [employer]  Pt was requesting a list of AA meetings  I was able to find a place in [address] [place] [address] for him  Pt was agreeable to me settting him in with PCP in [address] [place] [address]  Appointment was made with Markus Guido in [address] [place] [address]  Case Management reviewed discharge planning process including the following: identifying help that is needed at home, pt's preference for discharge needs and Meds at [facility]  Reviewed with Pt that any member of the healthcare team can answer questions regarding : medications, jmportance of recognizing  Signs and symptoms of any  medical problems  Case Management also encouraged pt to follow up with all recommended appointments after discharge  Priscilla Rosado

## 2019-04-11 NOTE — INCIDENTAL FINDINGS
The following findings require follow up:  Radiographic finding   Findin mm pulmonary nodule   Follow up required:  CT of the chest without contrast   Follow up should be done within 12 month(s)    Please notify the following clinician to assist with the follow up:   Dr Donny Sands

## 2019-04-11 NOTE — PLAN OF CARE
Pt to be d/c, Danii Console CM to see pt, RA, oob to chair/stand by asst, no pain,will call CM when d/c ready

## 2019-04-12 NOTE — UTILIZATION REVIEW
Notification of Discharge  This is a Notification of Discharge from our facility [address]  Please be advised that this patient has been discharge from our facility  Below you will find the admission and discharge date and time including the patients disposition  PRESENTATION DATE: 4/10/2019  1:32 AM  IP ADMISSION DATE: 4/10/19 0320  DISCHARGE DATE: 4/11/2019 11:26 AM  DISPOSITION: [id number] Utilization Review Department  Phone: [phone]; Fax [phone]  [email or web address]  ATTENTION: Please call with any questions or concerns to [phone]  and carefully listen to the prompts so that you are directed to the right person  Send all requests for admission clinical reviews, approved or denied determinations and any other requests to fax [phone]   All voicemails are confidential

## 2019-04-19 ENCOUNTER — TRANSITIONAL CARE MANAGEMENT (OUTPATIENT)
Dept: FAMILY MEDICINE CLINIC | Facility: HOME HEALTHCARE | Age: 59
End: 2019-04-19

## 2019-04-22 ENCOUNTER — APPOINTMENT (EMERGENCY)
Dept: CT IMAGING | Facility: HOSPITAL | Age: 59
DRG: 241 | End: 2019-04-22
Payer: COMMERCIAL

## 2019-04-22 ENCOUNTER — HOSPITAL ENCOUNTER (INPATIENT)
Facility: HOSPITAL | Age: 59
LOS: 3 days | Discharge: HOME HEALTH | DRG: 241 | End: 2019-04-25
Attending: EMERGENCY MEDICINE | Admitting: HOSPITALIST
Payer: COMMERCIAL

## 2019-04-22 ENCOUNTER — APPOINTMENT (EMERGENCY)
Dept: RADIOLOGY | Facility: HOSPITAL | Age: 59
DRG: 241 | End: 2019-04-22
Payer: COMMERCIAL

## 2019-04-22 DIAGNOSIS — K70.30 ALCOHOLIC CIRRHOSIS OF LIVER WITHOUT ASCITES (HCC): Chronic | ICD-10-CM

## 2019-04-22 DIAGNOSIS — R26.2 AMBULATORY DYSFUNCTION: ICD-10-CM

## 2019-04-22 DIAGNOSIS — K21.9 GASTROESOPHAGEAL REFLUX DISEASE, ESOPHAGITIS PRESENCE NOT SPECIFIED: ICD-10-CM

## 2019-04-22 DIAGNOSIS — D69.6 THROMBOCYTOPENIA (HCC): Chronic | ICD-10-CM

## 2019-04-22 DIAGNOSIS — R10.84 GENERALIZED ABDOMINAL PAIN: Chronic | ICD-10-CM

## 2019-04-22 DIAGNOSIS — F10.929 ALCOHOLIC INTOXICATION WITH COMPLICATION (HCC): Primary | ICD-10-CM

## 2019-04-22 DIAGNOSIS — F10.10 ALCOHOL ABUSE: Chronic | ICD-10-CM

## 2019-04-22 DIAGNOSIS — K62.5 RECTAL BLEED: Chronic | ICD-10-CM

## 2019-04-22 PROBLEM — I85.00 ESOPHAGEAL VARICES (HCC): Chronic | Status: ACTIVE | Noted: 2019-04-22

## 2019-04-22 PROBLEM — K74.60 LIVER CIRRHOSIS (HCC): Chronic | Status: ACTIVE | Noted: 2019-04-10

## 2019-04-22 PROBLEM — Z72.0 TOBACCO ABUSE: Chronic | Status: ACTIVE | Noted: 2019-04-22

## 2019-04-22 PROBLEM — E87.6 HYPOKALEMIA: Chronic | Status: ACTIVE | Noted: 2019-04-10

## 2019-04-22 LAB
ALBUMIN SERPL BCP-MCNC: 4 G/DL (ref 3.5–5.7)
ALP SERPL-CCNC: 72 U/L (ref 40–150)
ALT SERPL W P-5'-P-CCNC: 26 U/L (ref 7–52)
AMMONIA PLAS-SCNC: 71 UMOL/L (ref 25–90)
AMPHETAMINES SERPL QL SCN: NEGATIVE
ANION GAP SERPL CALCULATED.3IONS-SCNC: 12 MMOL/L (ref 4–13)
APTT PPP: 40 SECONDS (ref 26–38)
AST SERPL W P-5'-P-CCNC: 70 U/L (ref 13–39)
BACTERIA UR QL AUTO: ABNORMAL /HPF
BARBITURATES UR QL: NEGATIVE
BASOPHILS # BLD AUTO: 0.1 THOUSANDS/ΜL (ref 0–0.1)
BASOPHILS NFR BLD AUTO: 1 % (ref 0–2)
BENZODIAZ UR QL: NEGATIVE
BILIRUB SERPL-MCNC: 1.9 MG/DL (ref 0.2–1)
BILIRUB UR QL STRIP: NEGATIVE
BUN SERPL-MCNC: 10 MG/DL (ref 7–25)
CALCIUM SERPL-MCNC: 8.6 MG/DL (ref 8.6–10.5)
CHLORIDE SERPL-SCNC: 101 MMOL/L (ref 98–107)
CLARITY UR: CLEAR
CO2 SERPL-SCNC: 25 MMOL/L (ref 21–31)
COCAINE UR QL: NEGATIVE
COLOR UR: YELLOW
CREAT SERPL-MCNC: 0.79 MG/DL (ref 0.7–1.3)
EOSINOPHIL # BLD AUTO: 0.1 THOUSAND/ΜL (ref 0–0.61)
EOSINOPHIL NFR BLD AUTO: 1 % (ref 0–5)
ERYTHROCYTE [DISTWIDTH] IN BLOOD BY AUTOMATED COUNT: 17.8 % (ref 11.5–14.5)
ETHANOL SERPL-MCNC: 410.8 MG/DL
GFR SERPL CREATININE-BSD FRML MDRD: 100 ML/MIN/1.73SQ M
GLUCOSE SERPL-MCNC: 79 MG/DL (ref 65–140)
GLUCOSE SERPL-MCNC: 99 MG/DL (ref 65–99)
GLUCOSE UR STRIP-MCNC: NEGATIVE MG/DL
HCT VFR BLD AUTO: 44.9 % (ref 42–47)
HGB BLD-MCNC: 15.1 G/DL (ref 14–18)
HGB UR QL STRIP.AUTO: ABNORMAL
HYALINE CASTS #/AREA URNS LPF: ABNORMAL /LPF
INR PPP: 1.13 (ref 0.9–1.5)
KETONES UR STRIP-MCNC: NEGATIVE MG/DL
LEUKOCYTE ESTERASE UR QL STRIP: NEGATIVE
LYMPHOCYTES # BLD AUTO: 1.4 THOUSANDS/ΜL (ref 0.6–4.47)
LYMPHOCYTES NFR BLD AUTO: 24 % (ref 21–51)
MAGNESIUM SERPL-MCNC: 1.9 MG/DL (ref 1.9–2.7)
MCH RBC QN AUTO: 30.5 PG (ref 26–34)
MCHC RBC AUTO-ENTMCNC: 33.6 G/DL (ref 31–37)
MCV RBC AUTO: 91 FL (ref 81–99)
METHADONE UR QL: NEGATIVE
MONOCYTES # BLD AUTO: 0.6 THOUSAND/ΜL (ref 0.17–1.22)
MONOCYTES NFR BLD AUTO: 11 % (ref 2–12)
MUCOUS THREADS UR QL AUTO: ABNORMAL
NEUTROPHILS # BLD AUTO: 3.7 THOUSANDS/ΜL (ref 1.4–6.5)
NEUTS SEG NFR BLD AUTO: 63 % (ref 42–75)
NITRITE UR QL STRIP: NEGATIVE
NON-SQ EPI CELLS URNS QL MICRO: ABNORMAL /HPF
OPIATES UR QL SCN: NEGATIVE
PCP UR QL: NEGATIVE
PH UR STRIP.AUTO: 7 [PH]
PLATELET # BLD AUTO: 107 THOUSANDS/UL (ref 149–390)
PMV BLD AUTO: 7.8 FL (ref 8.6–11.7)
POTASSIUM SERPL-SCNC: 3.4 MMOL/L (ref 3.5–5.5)
PROT SERPL-MCNC: 7.3 G/DL (ref 6.4–8.9)
PROT UR STRIP-MCNC: ABNORMAL MG/DL
PROTHROMBIN TIME: 13.1 SECONDS (ref 10.2–13)
RBC # BLD AUTO: 4.93 MILLION/UL (ref 4.3–5.9)
RBC #/AREA URNS AUTO: ABNORMAL /HPF
SODIUM SERPL-SCNC: 138 MMOL/L (ref 134–143)
SP GR UR STRIP.AUTO: 1.01 (ref 1–1.03)
THC UR QL: NEGATIVE
TSH SERPL DL<=0.05 MIU/L-ACNC: 1.64 UIU/ML (ref 0.45–5.33)
UROBILINOGEN UR QL STRIP.AUTO: 0.2 E.U./DL
WBC # BLD AUTO: 5.8 THOUSAND/UL (ref 4.8–10.8)
WBC #/AREA URNS AUTO: ABNORMAL /HPF

## 2019-04-22 PROCEDURE — 96367 TX/PROPH/DG ADDL SEQ IV INF: CPT

## 2019-04-22 PROCEDURE — 82948 REAGENT STRIP/BLOOD GLUCOSE: CPT

## 2019-04-22 PROCEDURE — 85730 THROMBOPLASTIN TIME PARTIAL: CPT | Performed by: EMERGENCY MEDICINE

## 2019-04-22 PROCEDURE — 71045 X-RAY EXAM CHEST 1 VIEW: CPT

## 2019-04-22 PROCEDURE — 70450 CT HEAD/BRAIN W/O DYE: CPT

## 2019-04-22 PROCEDURE — 93005 ELECTROCARDIOGRAM TRACING: CPT

## 2019-04-22 PROCEDURE — 96361 HYDRATE IV INFUSION ADD-ON: CPT

## 2019-04-22 PROCEDURE — 83735 ASSAY OF MAGNESIUM: CPT | Performed by: EMERGENCY MEDICINE

## 2019-04-22 PROCEDURE — 81003 URINALYSIS AUTO W/O SCOPE: CPT | Performed by: EMERGENCY MEDICINE

## 2019-04-22 PROCEDURE — 99223 1ST HOSP IP/OBS HIGH 75: CPT | Performed by: NURSE PRACTITIONER

## 2019-04-22 PROCEDURE — 84443 ASSAY THYROID STIM HORMONE: CPT | Performed by: EMERGENCY MEDICINE

## 2019-04-22 PROCEDURE — 80307 DRUG TEST PRSMV CHEM ANLYZR: CPT | Performed by: EMERGENCY MEDICINE

## 2019-04-22 PROCEDURE — 80053 COMPREHEN METABOLIC PANEL: CPT | Performed by: EMERGENCY MEDICINE

## 2019-04-22 PROCEDURE — 96375 TX/PRO/DX INJ NEW DRUG ADDON: CPT

## 2019-04-22 PROCEDURE — 85025 COMPLETE CBC W/AUTO DIFF WBC: CPT | Performed by: EMERGENCY MEDICINE

## 2019-04-22 PROCEDURE — 74177 CT ABD & PELVIS W/CONTRAST: CPT

## 2019-04-22 PROCEDURE — 81001 URINALYSIS AUTO W/SCOPE: CPT | Performed by: EMERGENCY MEDICINE

## 2019-04-22 PROCEDURE — 80320 DRUG SCREEN QUANTALCOHOLS: CPT | Performed by: EMERGENCY MEDICINE

## 2019-04-22 PROCEDURE — 96365 THER/PROPH/DIAG IV INF INIT: CPT

## 2019-04-22 PROCEDURE — 99285 EMERGENCY DEPT VISIT HI MDM: CPT

## 2019-04-22 PROCEDURE — 85610 PROTHROMBIN TIME: CPT | Performed by: EMERGENCY MEDICINE

## 2019-04-22 PROCEDURE — 82140 ASSAY OF AMMONIA: CPT | Performed by: EMERGENCY MEDICINE

## 2019-04-22 PROCEDURE — 36415 COLL VENOUS BLD VENIPUNCTURE: CPT | Performed by: EMERGENCY MEDICINE

## 2019-04-22 RX ORDER — NICOTINE 21 MG/24HR
21 PATCH, TRANSDERMAL 24 HOURS TRANSDERMAL ONCE
Status: COMPLETED | OUTPATIENT
Start: 2019-04-22 | End: 2019-04-23

## 2019-04-22 RX ORDER — DEXTROSE AND SODIUM CHLORIDE 5; .9 G/100ML; G/100ML
100 INJECTION, SOLUTION INTRAVENOUS CONTINUOUS
Status: DISCONTINUED | OUTPATIENT
Start: 2019-04-22 | End: 2019-04-23

## 2019-04-22 RX ORDER — ONDANSETRON 2 MG/ML
4 INJECTION INTRAMUSCULAR; INTRAVENOUS ONCE
Status: DISCONTINUED | OUTPATIENT
Start: 2019-04-22 | End: 2019-04-22

## 2019-04-22 RX ORDER — POTASSIUM CHLORIDE 20 MEQ/1
40 TABLET, EXTENDED RELEASE ORAL ONCE
Status: COMPLETED | OUTPATIENT
Start: 2019-04-22 | End: 2019-04-22

## 2019-04-22 RX ORDER — ONDANSETRON 2 MG/ML
1 INJECTION INTRAMUSCULAR; INTRAVENOUS ONCE
Status: COMPLETED | OUTPATIENT
Start: 2019-04-22 | End: 2019-04-22

## 2019-04-22 RX ADMIN — DEXTROSE AND SODIUM CHLORIDE 100 ML/HR: 5; .9 INJECTION, SOLUTION INTRAVENOUS at 20:24

## 2019-04-22 RX ADMIN — IOHEXOL 80 ML: 350 INJECTION, SOLUTION INTRAVENOUS at 20:19

## 2019-04-22 RX ADMIN — POTASSIUM CHLORIDE 40 MEQ: 1500 TABLET, EXTENDED RELEASE ORAL at 20:22

## 2019-04-22 RX ADMIN — FAMOTIDINE 20 MG: 10 INJECTION, SOLUTION INTRAVENOUS at 19:25

## 2019-04-22 RX ADMIN — THIAMINE HYDROCHLORIDE 100 MG: 100 INJECTION, SOLUTION INTRAMUSCULAR; INTRAVENOUS at 20:41

## 2019-04-22 RX ADMIN — FOLIC ACID 1 MG: 5 INJECTION, SOLUTION INTRAMUSCULAR; INTRAVENOUS; SUBCUTANEOUS at 19:51

## 2019-04-22 RX ADMIN — Medication 21 MG: at 23:13

## 2019-04-23 LAB
ALBUMIN SERPL BCP-MCNC: 3.5 G/DL (ref 3.5–5.7)
ALP SERPL-CCNC: 61 U/L (ref 40–150)
ALT SERPL W P-5'-P-CCNC: 24 U/L (ref 7–52)
ANION GAP SERPL CALCULATED.3IONS-SCNC: 8 MMOL/L (ref 4–13)
ANISOCYTOSIS BLD QL SMEAR: PRESENT
AST SERPL W P-5'-P-CCNC: 65 U/L (ref 13–39)
ATRIAL RATE: 118 BPM
BILIRUB SERPL-MCNC: 1.5 MG/DL (ref 0.2–1)
BUN SERPL-MCNC: 8 MG/DL (ref 7–25)
CALCIUM SERPL-MCNC: 8 MG/DL (ref 8.6–10.5)
CHLORIDE SERPL-SCNC: 108 MMOL/L (ref 98–107)
CO2 SERPL-SCNC: 26 MMOL/L (ref 21–31)
CREAT SERPL-MCNC: 0.74 MG/DL (ref 0.7–1.3)
EOSINOPHIL # BLD AUTO: 0.05 THOUSAND/UL (ref 0–0.61)
EOSINOPHIL NFR BLD MANUAL: 2 % (ref 0–6)
ERYTHROCYTE [DISTWIDTH] IN BLOOD BY AUTOMATED COUNT: 18.1 % (ref 11.5–14.5)
ETHANOL SERPL-MCNC: 205 MG/DL
GFR SERPL CREATININE-BSD FRML MDRD: 102 ML/MIN/1.73SQ M
GLUCOSE SERPL-MCNC: 95 MG/DL (ref 65–99)
HCT VFR BLD AUTO: 41.4 % (ref 42–47)
HEMOCCULT STL QL: NEGATIVE
HGB BLD-MCNC: 13.8 G/DL (ref 14–18)
LG PLATELETS BLD QL SMEAR: PRESENT
LYMPHOCYTES # BLD AUTO: 1.07 THOUSAND/UL (ref 0.6–4.47)
LYMPHOCYTES # BLD AUTO: 41 % (ref 20–51)
MACROCYTES BLD QL AUTO: PRESENT
MCH RBC QN AUTO: 30.4 PG (ref 26–34)
MCHC RBC AUTO-ENTMCNC: 33.3 G/DL (ref 31–37)
MCV RBC AUTO: 91 FL (ref 81–99)
MONOCYTES # BLD AUTO: 0.36 THOUSAND/UL (ref 0–1.22)
MONOCYTES NFR BLD AUTO: 14 % (ref 4–12)
NEUTS SEG # BLD: 1.12 THOUSAND/UL (ref 1.81–6.82)
NEUTS SEG NFR BLD AUTO: 43 % (ref 42–75)
P AXIS: 6 DEGREES
PHOSPHATE SERPL-MCNC: 3.4 MG/DL (ref 3–5.5)
PLATELET # BLD AUTO: 68 THOUSANDS/UL (ref 149–390)
PLATELET BLD QL SMEAR: ABNORMAL
PMV BLD AUTO: 7.7 FL (ref 8.6–11.7)
POTASSIUM SERPL-SCNC: 3.9 MMOL/L (ref 3.5–5.5)
PR INTERVAL: 174 MS
PROT SERPL-MCNC: 6.5 G/DL (ref 6.4–8.9)
QRS AXIS: -64 DEGREES
QRSD INTERVAL: 98 MS
QT INTERVAL: 322 MS
QTC INTERVAL: 451 MS
RBC # BLD AUTO: 4.54 MILLION/UL (ref 4.3–5.9)
SODIUM SERPL-SCNC: 142 MMOL/L (ref 134–143)
T WAVE AXIS: 34 DEGREES
TOTAL CELLS COUNTED SPEC: 100
VENTRICULAR RATE: 118 BPM
WBC # BLD AUTO: 2.6 THOUSAND/UL (ref 4.8–10.8)

## 2019-04-23 PROCEDURE — 97167 OT EVAL HIGH COMPLEX 60 MIN: CPT

## 2019-04-23 PROCEDURE — 80320 DRUG SCREEN QUANTALCOHOLS: CPT | Performed by: NURSE PRACTITIONER

## 2019-04-23 PROCEDURE — 87522 HEPATITIS C REVRS TRNSCRPJ: CPT | Performed by: PHYSICIAN ASSISTANT

## 2019-04-23 PROCEDURE — 82272 OCCULT BLD FECES 1-3 TESTS: CPT | Performed by: NURSE PRACTITIONER

## 2019-04-23 PROCEDURE — G8979 MOBILITY GOAL STATUS: HCPCS

## 2019-04-23 PROCEDURE — G8978 MOBILITY CURRENT STATUS: HCPCS

## 2019-04-23 PROCEDURE — 94760 N-INVAS EAR/PLS OXIMETRY 1: CPT

## 2019-04-23 PROCEDURE — 84100 ASSAY OF PHOSPHORUS: CPT | Performed by: NURSE PRACTITIONER

## 2019-04-23 PROCEDURE — 85027 COMPLETE CBC AUTOMATED: CPT | Performed by: NURSE PRACTITIONER

## 2019-04-23 PROCEDURE — 97163 PT EVAL HIGH COMPLEX 45 MIN: CPT

## 2019-04-23 PROCEDURE — 99232 SBSQ HOSP IP/OBS MODERATE 35: CPT | Performed by: HOSPITALIST

## 2019-04-23 PROCEDURE — C9113 INJ PANTOPRAZOLE SODIUM, VIA: HCPCS | Performed by: HOSPITALIST

## 2019-04-23 PROCEDURE — 93010 ELECTROCARDIOGRAM REPORT: CPT | Performed by: INTERNAL MEDICINE

## 2019-04-23 PROCEDURE — G8987 SELF CARE CURRENT STATUS: HCPCS

## 2019-04-23 PROCEDURE — 85007 BL SMEAR W/DIFF WBC COUNT: CPT | Performed by: NURSE PRACTITIONER

## 2019-04-23 PROCEDURE — 97530 THERAPEUTIC ACTIVITIES: CPT

## 2019-04-23 PROCEDURE — 80053 COMPREHEN METABOLIC PANEL: CPT | Performed by: NURSE PRACTITIONER

## 2019-04-23 PROCEDURE — G8988 SELF CARE GOAL STATUS: HCPCS

## 2019-04-23 RX ORDER — BISACODYL 10 MG
10 SUPPOSITORY, RECTAL RECTAL ONCE
Status: COMPLETED | OUTPATIENT
Start: 2019-04-23 | End: 2019-04-23

## 2019-04-23 RX ORDER — CHLORDIAZEPOXIDE HYDROCHLORIDE 25 MG/1
50 CAPSULE, GELATIN COATED ORAL EVERY 6 HOURS SCHEDULED
Status: DISCONTINUED | OUTPATIENT
Start: 2019-04-23 | End: 2019-04-24

## 2019-04-23 RX ORDER — CHLORDIAZEPOXIDE HYDROCHLORIDE 25 MG/1
25 CAPSULE, GELATIN COATED ORAL EVERY 12 HOURS SCHEDULED
Status: DISCONTINUED | OUTPATIENT
Start: 2019-04-24 | End: 2019-04-23

## 2019-04-23 RX ORDER — ONDANSETRON 2 MG/ML
4 INJECTION INTRAMUSCULAR; INTRAVENOUS EVERY 6 HOURS PRN
Status: DISCONTINUED | OUTPATIENT
Start: 2019-04-23 | End: 2019-04-25 | Stop reason: HOSPADM

## 2019-04-23 RX ORDER — LORAZEPAM 2 MG/ML
2 INJECTION INTRAMUSCULAR ONCE
Status: COMPLETED | OUTPATIENT
Start: 2019-04-23 | End: 2019-04-23

## 2019-04-23 RX ORDER — LORAZEPAM 1 MG/1
2 TABLET ORAL ONCE
Status: COMPLETED | OUTPATIENT
Start: 2019-04-23 | End: 2019-04-23

## 2019-04-23 RX ORDER — SODIUM CHLORIDE AND POTASSIUM CHLORIDE .9; .15 G/100ML; G/100ML
125 SOLUTION INTRAVENOUS CONTINUOUS
Status: DISCONTINUED | OUTPATIENT
Start: 2019-04-23 | End: 2019-04-25 | Stop reason: HOSPADM

## 2019-04-23 RX ORDER — FOLIC ACID 1 MG/1
1 TABLET ORAL DAILY
Status: DISCONTINUED | OUTPATIENT
Start: 2019-04-23 | End: 2019-04-25 | Stop reason: HOSPADM

## 2019-04-23 RX ORDER — TRAZODONE HYDROCHLORIDE 50 MG/1
150 TABLET ORAL
Status: DISCONTINUED | OUTPATIENT
Start: 2019-04-23 | End: 2019-04-25 | Stop reason: HOSPADM

## 2019-04-23 RX ORDER — CHLORHEXIDINE GLUCONATE 0.12 MG/ML
15 RINSE ORAL EVERY 12 HOURS SCHEDULED
Status: DISCONTINUED | OUTPATIENT
Start: 2019-04-23 | End: 2019-04-25 | Stop reason: HOSPADM

## 2019-04-23 RX ORDER — GABAPENTIN 300 MG/1
600 CAPSULE ORAL 3 TIMES DAILY PRN
Status: DISCONTINUED | OUTPATIENT
Start: 2019-04-23 | End: 2019-04-25 | Stop reason: HOSPADM

## 2019-04-23 RX ORDER — LISINOPRIL 10 MG/1
10 TABLET ORAL DAILY
Status: DISCONTINUED | OUTPATIENT
Start: 2019-04-23 | End: 2019-04-25 | Stop reason: HOSPADM

## 2019-04-23 RX ORDER — CHLORDIAZEPOXIDE HYDROCHLORIDE 25 MG/1
25 CAPSULE, GELATIN COATED ORAL EVERY 8 HOURS SCHEDULED
Status: DISCONTINUED | OUTPATIENT
Start: 2019-04-23 | End: 2019-04-23

## 2019-04-23 RX ORDER — MULTIVITAMIN/IRON/FOLIC ACID 18MG-0.4MG
1 TABLET ORAL DAILY
Status: DISCONTINUED | OUTPATIENT
Start: 2019-04-23 | End: 2019-04-25 | Stop reason: HOSPADM

## 2019-04-23 RX ORDER — POLYETHYLENE GLYCOL 3350 17 G/17G
17 POWDER, FOR SOLUTION ORAL 2 TIMES DAILY PRN
Status: DISCONTINUED | OUTPATIENT
Start: 2019-04-23 | End: 2019-04-25 | Stop reason: HOSPADM

## 2019-04-23 RX ORDER — PANTOPRAZOLE SODIUM 40 MG/1
40 INJECTION, POWDER, FOR SOLUTION INTRAVENOUS EVERY 12 HOURS SCHEDULED
Status: DISCONTINUED | OUTPATIENT
Start: 2019-04-23 | End: 2019-04-24

## 2019-04-23 RX ORDER — CHLORDIAZEPOXIDE HYDROCHLORIDE 25 MG/1
25 CAPSULE, GELATIN COATED ORAL EVERY 4 HOURS PRN
Status: DISCONTINUED | OUTPATIENT
Start: 2019-04-28 | End: 2019-04-23

## 2019-04-23 RX ORDER — THIAMINE MONONITRATE (VIT B1) 100 MG
100 TABLET ORAL DAILY
Status: DISCONTINUED | OUTPATIENT
Start: 2019-04-23 | End: 2019-04-25 | Stop reason: HOSPADM

## 2019-04-23 RX ORDER — NICOTINE 21 MG/24HR
1 PATCH, TRANSDERMAL 24 HOURS TRANSDERMAL DAILY
Status: DISCONTINUED | OUTPATIENT
Start: 2019-04-23 | End: 2019-04-25 | Stop reason: HOSPADM

## 2019-04-23 RX ORDER — HYDROCHLOROTHIAZIDE 12.5 MG/1
12.5 TABLET ORAL DAILY
Status: DISCONTINUED | OUTPATIENT
Start: 2019-04-23 | End: 2019-04-25 | Stop reason: HOSPADM

## 2019-04-23 RX ORDER — LORAZEPAM 2 MG/ML
2 INJECTION INTRAMUSCULAR EVERY 6 HOURS PRN
Status: DISCONTINUED | OUTPATIENT
Start: 2019-04-23 | End: 2019-04-25 | Stop reason: HOSPADM

## 2019-04-23 RX ADMIN — LISINOPRIL 10 MG: 10 TABLET ORAL at 10:46

## 2019-04-23 RX ADMIN — CHLORDIAZEPOXIDE HYDROCHLORIDE 25 MG: 25 CAPSULE ORAL at 01:09

## 2019-04-23 RX ADMIN — CHLORHEXIDINE GLUCONATE 0.12% ORAL RINSE 15 ML: 1.2 LIQUID ORAL at 20:09

## 2019-04-23 RX ADMIN — LORAZEPAM 2 MG: 1 TABLET ORAL at 20:31

## 2019-04-23 RX ADMIN — Medication 100 MG: at 09:08

## 2019-04-23 RX ADMIN — TRAZODONE HYDROCHLORIDE 150 MG: 50 TABLET ORAL at 01:09

## 2019-04-23 RX ADMIN — CHLORDIAZEPOXIDE HYDROCHLORIDE 50 MG: 25 CAPSULE ORAL at 11:06

## 2019-04-23 RX ADMIN — LORAZEPAM 2 MG: 1 TABLET ORAL at 13:13

## 2019-04-23 RX ADMIN — LORAZEPAM 2 MG: 2 INJECTION INTRAMUSCULAR; INTRAVENOUS at 05:41

## 2019-04-23 RX ADMIN — POLYETHYLENE GLYCOL 3350 17 G: 17 POWDER, FOR SOLUTION ORAL at 14:22

## 2019-04-23 RX ADMIN — NICOTINE 1 PATCH: 21 PATCH, EXTENDED RELEASE TRANSDERMAL at 09:07

## 2019-04-23 RX ADMIN — LORAZEPAM 2 MG: 1 TABLET ORAL at 11:06

## 2019-04-23 RX ADMIN — LORAZEPAM 2 MG: 1 TABLET ORAL at 14:22

## 2019-04-23 RX ADMIN — POTASSIUM CHLORIDE AND SODIUM CHLORIDE 125 ML/HR: 900; 150 INJECTION, SOLUTION INTRAVENOUS at 09:05

## 2019-04-23 RX ADMIN — CHLORHEXIDINE GLUCONATE 0.12% ORAL RINSE 15 ML: 1.2 LIQUID ORAL at 01:09

## 2019-04-23 RX ADMIN — Medication 1 TABLET: at 09:08

## 2019-04-23 RX ADMIN — CHLORDIAZEPOXIDE HYDROCHLORIDE 50 MG: 25 CAPSULE ORAL at 17:06

## 2019-04-23 RX ADMIN — HYDROCHLOROTHIAZIDE 12.5 MG: 12.5 TABLET ORAL at 10:47

## 2019-04-23 RX ADMIN — LORAZEPAM 2 MG: 1 TABLET ORAL at 18:23

## 2019-04-23 RX ADMIN — POTASSIUM CHLORIDE AND SODIUM CHLORIDE 125 ML/HR: 900; 150 INJECTION, SOLUTION INTRAVENOUS at 17:08

## 2019-04-23 RX ADMIN — PANTOPRAZOLE SODIUM 40 MG: 40 INJECTION, POWDER, FOR SOLUTION INTRAVENOUS at 20:09

## 2019-04-23 RX ADMIN — BISACODYL 10 MG: 10 SUPPOSITORY RECTAL at 10:48

## 2019-04-23 RX ADMIN — FOLIC ACID 1 MG: 1 TABLET ORAL at 09:08

## 2019-04-23 RX ADMIN — CHLORDIAZEPOXIDE HYDROCHLORIDE 25 MG: 25 CAPSULE ORAL at 06:16

## 2019-04-23 RX ADMIN — LORAZEPAM 2 MG: 2 INJECTION INTRAMUSCULAR; INTRAVENOUS at 09:08

## 2019-04-23 RX ADMIN — LORAZEPAM 2 MG: 1 TABLET ORAL at 17:06

## 2019-04-23 RX ADMIN — CHLORHEXIDINE GLUCONATE 0.12% ORAL RINSE 15 ML: 1.2 LIQUID ORAL at 09:07

## 2019-04-23 RX ADMIN — ONDANSETRON 4 MG: 2 INJECTION INTRAMUSCULAR; INTRAVENOUS at 05:48

## 2019-04-23 RX ADMIN — POTASSIUM CHLORIDE AND SODIUM CHLORIDE 125 ML/HR: 900; 150 INJECTION, SOLUTION INTRAVENOUS at 01:05

## 2019-04-23 RX ADMIN — PANTOPRAZOLE SODIUM 40 MG: 40 INJECTION, POWDER, FOR SOLUTION INTRAVENOUS at 09:07

## 2019-04-24 LAB
ALBUMIN SERPL BCP-MCNC: 3.3 G/DL (ref 3.5–5.7)
ALP SERPL-CCNC: 66 U/L (ref 40–150)
ALT SERPL W P-5'-P-CCNC: 23 U/L (ref 7–52)
ANION GAP SERPL CALCULATED.3IONS-SCNC: 7 MMOL/L (ref 4–13)
ANISOCYTOSIS BLD QL SMEAR: PRESENT
AST SERPL W P-5'-P-CCNC: 56 U/L (ref 13–39)
BASOPHILS # BLD AUTO: 0 THOUSANDS/ΜL (ref 0–0.1)
BASOPHILS NFR BLD AUTO: 1 % (ref 0–2)
BILIRUB SERPL-MCNC: 2.7 MG/DL (ref 0.2–1)
BUN SERPL-MCNC: 9 MG/DL (ref 7–25)
CALCIUM SERPL-MCNC: 8.1 MG/DL (ref 8.6–10.5)
CHLORIDE SERPL-SCNC: 106 MMOL/L (ref 98–107)
CO2 SERPL-SCNC: 26 MMOL/L (ref 21–31)
CREAT SERPL-MCNC: 0.65 MG/DL (ref 0.7–1.3)
EOSINOPHIL # BLD AUTO: 0.1 THOUSAND/ΜL (ref 0–0.61)
EOSINOPHIL NFR BLD AUTO: 3 % (ref 0–5)
ERYTHROCYTE [DISTWIDTH] IN BLOOD BY AUTOMATED COUNT: 17 % (ref 11.5–14.5)
ETHANOL SERPL-MCNC: <10 MG/DL
GFR SERPL CREATININE-BSD FRML MDRD: 108 ML/MIN/1.73SQ M
GLUCOSE SERPL-MCNC: 89 MG/DL (ref 65–99)
HCT VFR BLD AUTO: 40.9 % (ref 42–47)
HGB BLD-MCNC: 13.6 G/DL (ref 14–18)
LG PLATELETS BLD QL SMEAR: PRESENT
LYMPHOCYTES # BLD AUTO: 0.5 THOUSANDS/ΜL (ref 0.6–4.47)
LYMPHOCYTES NFR BLD AUTO: 23 % (ref 21–51)
MACROCYTES BLD QL AUTO: PRESENT
MAGNESIUM SERPL-MCNC: 1.6 MG/DL (ref 1.9–2.7)
MCH RBC QN AUTO: 30.5 PG (ref 26–34)
MCHC RBC AUTO-ENTMCNC: 33.3 G/DL (ref 31–37)
MCV RBC AUTO: 92 FL (ref 81–99)
MONOCYTES # BLD AUTO: 0.2 THOUSAND/ΜL (ref 0.17–1.22)
MONOCYTES NFR BLD AUTO: 11 % (ref 2–12)
NEUTROPHILS # BLD AUTO: 1.3 THOUSANDS/ΜL (ref 1.4–6.5)
NEUTS SEG NFR BLD AUTO: 63 % (ref 42–75)
PHOSPHATE SERPL-MCNC: 2.2 MG/DL (ref 3–5.5)
PLATELET # BLD AUTO: 43 THOUSANDS/UL (ref 149–390)
PLATELET BLD QL SMEAR: ABNORMAL
PMV BLD AUTO: 7.9 FL (ref 8.6–11.7)
POTASSIUM SERPL-SCNC: 3.7 MMOL/L (ref 3.5–5.5)
PROT SERPL-MCNC: 6.1 G/DL (ref 6.4–8.9)
RBC # BLD AUTO: 4.47 MILLION/UL (ref 4.3–5.9)
RBC MORPH BLD: PRESENT
SODIUM SERPL-SCNC: 139 MMOL/L (ref 134–143)
WBC # BLD AUTO: 2.1 THOUSAND/UL (ref 4.8–10.8)

## 2019-04-24 PROCEDURE — 99232 SBSQ HOSP IP/OBS MODERATE 35: CPT | Performed by: HOSPITALIST

## 2019-04-24 PROCEDURE — 99253 IP/OBS CNSLTJ NEW/EST LOW 45: CPT | Performed by: NURSE PRACTITIONER

## 2019-04-24 PROCEDURE — 85025 COMPLETE CBC W/AUTO DIFF WBC: CPT | Performed by: HOSPITALIST

## 2019-04-24 PROCEDURE — 80053 COMPREHEN METABOLIC PANEL: CPT | Performed by: HOSPITALIST

## 2019-04-24 PROCEDURE — 80320 DRUG SCREEN QUANTALCOHOLS: CPT | Performed by: HOSPITALIST

## 2019-04-24 PROCEDURE — 84100 ASSAY OF PHOSPHORUS: CPT | Performed by: HOSPITALIST

## 2019-04-24 PROCEDURE — 83735 ASSAY OF MAGNESIUM: CPT | Performed by: HOSPITALIST

## 2019-04-24 RX ORDER — HYDRALAZINE HYDROCHLORIDE 20 MG/ML
10 INJECTION INTRAMUSCULAR; INTRAVENOUS EVERY 6 HOURS PRN
Status: DISCONTINUED | OUTPATIENT
Start: 2019-04-24 | End: 2019-04-25 | Stop reason: HOSPADM

## 2019-04-24 RX ORDER — LORAZEPAM 1 MG/1
2 TABLET ORAL ONCE
Status: COMPLETED | OUTPATIENT
Start: 2019-04-24 | End: 2019-04-24

## 2019-04-24 RX ORDER — MAGNESIUM SULFATE HEPTAHYDRATE 40 MG/ML
2 INJECTION, SOLUTION INTRAVENOUS ONCE
Status: COMPLETED | OUTPATIENT
Start: 2019-04-24 | End: 2019-04-24

## 2019-04-24 RX ORDER — PANTOPRAZOLE SODIUM 40 MG/1
40 TABLET, DELAYED RELEASE ORAL
Status: DISCONTINUED | OUTPATIENT
Start: 2019-04-24 | End: 2019-04-25 | Stop reason: HOSPADM

## 2019-04-24 RX ORDER — CHLORDIAZEPOXIDE HYDROCHLORIDE 25 MG/1
25 CAPSULE, GELATIN COATED ORAL EVERY 6 HOURS SCHEDULED
Status: DISCONTINUED | OUTPATIENT
Start: 2019-04-24 | End: 2019-04-25 | Stop reason: HOSPADM

## 2019-04-24 RX ADMIN — CHLORDIAZEPOXIDE HYDROCHLORIDE 50 MG: 25 CAPSULE ORAL at 00:09

## 2019-04-24 RX ADMIN — LORAZEPAM 2 MG: 2 INJECTION INTRAMUSCULAR; INTRAVENOUS at 20:39

## 2019-04-24 RX ADMIN — SODIUM PHOSPHATE, MONOBASIC, MONOHYDRATE 30 MMOL: 276; 142 INJECTION, SOLUTION INTRAVENOUS at 11:05

## 2019-04-24 RX ADMIN — HYDRALAZINE HYDROCHLORIDE 10 MG: 20 INJECTION INTRAMUSCULAR; INTRAVENOUS at 17:08

## 2019-04-24 RX ADMIN — LORAZEPAM 2 MG: 1 TABLET ORAL at 03:28

## 2019-04-24 RX ADMIN — POTASSIUM CHLORIDE AND SODIUM CHLORIDE 125 ML/HR: 900; 150 INJECTION, SOLUTION INTRAVENOUS at 22:40

## 2019-04-24 RX ADMIN — TRAZODONE HYDROCHLORIDE 150 MG: 50 TABLET ORAL at 21:35

## 2019-04-24 RX ADMIN — CHLORDIAZEPOXIDE HYDROCHLORIDE 25 MG: 25 CAPSULE ORAL at 17:08

## 2019-04-24 RX ADMIN — Medication 1 TABLET: at 09:49

## 2019-04-24 RX ADMIN — Medication 100 MG: at 09:49

## 2019-04-24 RX ADMIN — CHLORDIAZEPOXIDE HYDROCHLORIDE 25 MG: 25 CAPSULE ORAL at 23:20

## 2019-04-24 RX ADMIN — CHLORDIAZEPOXIDE HYDROCHLORIDE 25 MG: 25 CAPSULE ORAL at 11:07

## 2019-04-24 RX ADMIN — POTASSIUM CHLORIDE AND SODIUM CHLORIDE 125 ML/HR: 900; 150 INJECTION, SOLUTION INTRAVENOUS at 01:15

## 2019-04-24 RX ADMIN — PANTOPRAZOLE SODIUM 40 MG: 40 TABLET, DELAYED RELEASE ORAL at 09:49

## 2019-04-24 RX ADMIN — HYDRALAZINE HYDROCHLORIDE 10 MG: 20 INJECTION INTRAMUSCULAR; INTRAVENOUS at 05:02

## 2019-04-24 RX ADMIN — MAGNESIUM SULFATE IN WATER 2 G: 40 INJECTION, SOLUTION INTRAVENOUS at 09:49

## 2019-04-24 RX ADMIN — CHLORDIAZEPOXIDE HYDROCHLORIDE 50 MG: 25 CAPSULE ORAL at 05:03

## 2019-04-24 RX ADMIN — CHLORHEXIDINE GLUCONATE 0.12% ORAL RINSE 15 ML: 1.2 LIQUID ORAL at 09:48

## 2019-04-24 RX ADMIN — FOLIC ACID 1 MG: 1 TABLET ORAL at 09:48

## 2019-04-24 RX ADMIN — POTASSIUM CHLORIDE AND SODIUM CHLORIDE 125 ML/HR: 900; 150 INJECTION, SOLUTION INTRAVENOUS at 16:00

## 2019-04-24 RX ADMIN — CHLORHEXIDINE GLUCONATE 0.12% ORAL RINSE 15 ML: 1.2 LIQUID ORAL at 21:35

## 2019-04-24 RX ADMIN — HYDROCHLOROTHIAZIDE 12.5 MG: 12.5 TABLET ORAL at 09:49

## 2019-04-24 RX ADMIN — LISINOPRIL 10 MG: 10 TABLET ORAL at 11:07

## 2019-04-24 RX ADMIN — NICOTINE 1 PATCH: 21 PATCH, EXTENDED RELEASE TRANSDERMAL at 09:50

## 2019-04-25 VITALS
HEIGHT: 72 IN | RESPIRATION RATE: 22 BRPM | OXYGEN SATURATION: 93 % | DIASTOLIC BLOOD PRESSURE: 112 MMHG | BODY MASS INDEX: 27.09 KG/M2 | SYSTOLIC BLOOD PRESSURE: 151 MMHG | TEMPERATURE: 98.1 F | HEART RATE: 84 BPM | WEIGHT: 200 LBS

## 2019-04-25 LAB
ALBUMIN SERPL BCP-MCNC: 3.3 G/DL (ref 3.5–5.7)
ALP SERPL-CCNC: 67 U/L (ref 40–150)
ALT SERPL W P-5'-P-CCNC: 20 U/L (ref 7–52)
ANION GAP SERPL CALCULATED.3IONS-SCNC: 7 MMOL/L (ref 4–13)
ANISOCYTOSIS BLD QL SMEAR: PRESENT
AST SERPL W P-5'-P-CCNC: 43 U/L (ref 13–39)
BILIRUB SERPL-MCNC: 2.9 MG/DL (ref 0.2–1)
BUN SERPL-MCNC: 11 MG/DL (ref 7–25)
CALCIUM SERPL-MCNC: 8.4 MG/DL (ref 8.6–10.5)
CHLORIDE SERPL-SCNC: 107 MMOL/L (ref 98–107)
CO2 SERPL-SCNC: 22 MMOL/L (ref 21–31)
CREAT SERPL-MCNC: 0.71 MG/DL (ref 0.7–1.3)
ERYTHROCYTE [DISTWIDTH] IN BLOOD BY AUTOMATED COUNT: 17.8 % (ref 11.5–14.5)
GFR SERPL CREATININE-BSD FRML MDRD: 104 ML/MIN/1.73SQ M
GLUCOSE SERPL-MCNC: 90 MG/DL (ref 65–99)
HCT VFR BLD AUTO: 40.6 % (ref 42–47)
HCV RNA SERPL NAA+PROBE-ACNC: NORMAL IU/ML
HGB BLD-MCNC: 13.6 G/DL (ref 14–18)
MACROCYTES BLD QL AUTO: PRESENT
MAGNESIUM SERPL-MCNC: 1.8 MG/DL (ref 1.9–2.7)
MCH RBC QN AUTO: 30.7 PG (ref 26–34)
MCHC RBC AUTO-ENTMCNC: 33.6 G/DL (ref 31–37)
MCV RBC AUTO: 92 FL (ref 81–99)
PHOSPHATE SERPL-MCNC: 2.9 MG/DL (ref 3–5.5)
PLATELET # BLD AUTO: 38 THOUSANDS/UL (ref 149–390)
PLATELET BLD QL SMEAR: ABNORMAL
PMV BLD AUTO: 8.2 FL (ref 8.6–11.7)
POTASSIUM SERPL-SCNC: 4 MMOL/L (ref 3.5–5.5)
PROT SERPL-MCNC: 6.3 G/DL (ref 6.4–8.9)
RBC # BLD AUTO: 4.43 MILLION/UL (ref 4.3–5.9)
RBC MORPH BLD: PRESENT
SODIUM SERPL-SCNC: 136 MMOL/L (ref 134–143)
TEST INFORMATION: NORMAL
WBC # BLD AUTO: 2.4 THOUSAND/UL (ref 4.8–10.8)

## 2019-04-25 PROCEDURE — 97116 GAIT TRAINING THERAPY: CPT

## 2019-04-25 PROCEDURE — 80053 COMPREHEN METABOLIC PANEL: CPT | Performed by: HOSPITALIST

## 2019-04-25 PROCEDURE — 84100 ASSAY OF PHOSPHORUS: CPT | Performed by: HOSPITALIST

## 2019-04-25 PROCEDURE — 99239 HOSP IP/OBS DSCHRG MGMT >30: CPT | Performed by: HOSPITALIST

## 2019-04-25 PROCEDURE — 97535 SELF CARE MNGMENT TRAINING: CPT

## 2019-04-25 PROCEDURE — 83735 ASSAY OF MAGNESIUM: CPT | Performed by: HOSPITALIST

## 2019-04-25 RX ORDER — PANTOPRAZOLE SODIUM 40 MG/1
40 TABLET, DELAYED RELEASE ORAL
Qty: 30 TABLET | Refills: 0 | Status: SHIPPED | OUTPATIENT
Start: 2019-04-26 | End: 2019-05-17 | Stop reason: SDUPTHER

## 2019-04-25 RX ORDER — MULTIVITAMIN/IRON/FOLIC ACID 18MG-0.4MG
1 TABLET ORAL DAILY
Qty: 30 TABLET | Refills: 0 | Status: SHIPPED | OUTPATIENT
Start: 2019-04-25 | End: 2019-04-30

## 2019-04-25 RX ADMIN — NICOTINE 1 PATCH: 21 PATCH, EXTENDED RELEASE TRANSDERMAL at 08:23

## 2019-04-25 RX ADMIN — GABAPENTIN 600 MG: 300 CAPSULE ORAL at 07:50

## 2019-04-25 RX ADMIN — CHLORHEXIDINE GLUCONATE 0.12% ORAL RINSE 15 ML: 1.2 LIQUID ORAL at 08:21

## 2019-04-25 RX ADMIN — Medication 100 MG: at 08:22

## 2019-04-25 RX ADMIN — HYDROCHLOROTHIAZIDE 12.5 MG: 12.5 TABLET ORAL at 08:21

## 2019-04-25 RX ADMIN — CHLORDIAZEPOXIDE HYDROCHLORIDE 25 MG: 25 CAPSULE ORAL at 06:22

## 2019-04-25 RX ADMIN — POTASSIUM CHLORIDE AND SODIUM CHLORIDE 125 ML/HR: 900; 150 INJECTION, SOLUTION INTRAVENOUS at 06:28

## 2019-04-25 RX ADMIN — LISINOPRIL 10 MG: 10 TABLET ORAL at 08:21

## 2019-04-25 RX ADMIN — FOLIC ACID 1 MG: 1 TABLET ORAL at 08:21

## 2019-04-25 RX ADMIN — CHLORDIAZEPOXIDE HYDROCHLORIDE 25 MG: 25 CAPSULE ORAL at 11:56

## 2019-04-25 RX ADMIN — Medication 1 TABLET: at 08:22

## 2019-04-25 RX ADMIN — PANTOPRAZOLE SODIUM 40 MG: 40 TABLET, DELAYED RELEASE ORAL at 06:22

## 2019-04-30 ENCOUNTER — TRANSITIONAL CARE MANAGEMENT (OUTPATIENT)
Dept: FAMILY MEDICINE CLINIC | Facility: HOME HEALTHCARE | Age: 59
End: 2019-04-30

## 2019-04-30 ENCOUNTER — OFFICE VISIT (OUTPATIENT)
Dept: FAMILY MEDICINE CLINIC | Facility: HOME HEALTHCARE | Age: 59
End: 2019-04-30
Payer: COMMERCIAL

## 2019-04-30 VITALS
OXYGEN SATURATION: 92 % | TEMPERATURE: 99 F | WEIGHT: 206 LBS | SYSTOLIC BLOOD PRESSURE: 122 MMHG | RESPIRATION RATE: 20 BRPM | HEIGHT: 72 IN | BODY MASS INDEX: 27.9 KG/M2 | DIASTOLIC BLOOD PRESSURE: 72 MMHG | HEART RATE: 91 BPM

## 2019-04-30 DIAGNOSIS — IMO0001 TRANSITION OF CARE PERFORMED WITH SHARING OF CLINICAL SUMMARY: Primary | ICD-10-CM

## 2019-04-30 DIAGNOSIS — K76.9 LIVER LESION: ICD-10-CM

## 2019-04-30 DIAGNOSIS — F10.20 ALCOHOLISM (HCC): ICD-10-CM

## 2019-04-30 PROBLEM — I21.4 NSTEMI (NON-ST ELEVATED MYOCARDIAL INFARCTION) (HCC): Status: ACTIVE | Noted: 2018-02-11

## 2019-04-30 PROBLEM — R26.2 AMBULATORY DYSFUNCTION: Status: ACTIVE | Noted: 2017-04-19

## 2019-04-30 PROBLEM — D62 ACUTE BLOOD LOSS ANEMIA: Status: ACTIVE | Noted: 2018-02-12

## 2019-04-30 PROBLEM — Z72.89 ALCOHOL USE: Status: ACTIVE | Noted: 2017-04-02

## 2019-04-30 PROBLEM — R56.9 SEIZURE (HCC): Status: ACTIVE | Noted: 2018-11-18

## 2019-04-30 PROBLEM — R97.20 BPH WITH ELEVATED PSA: Status: ACTIVE | Noted: 2019-04-30

## 2019-04-30 PROBLEM — F31.9 BIPOLAR 1 DISORDER (HCC): Status: ACTIVE | Noted: 2019-04-30

## 2019-04-30 PROBLEM — K64.8 INTERNAL HEMORRHOID: Status: ACTIVE | Noted: 2017-04-05

## 2019-04-30 PROBLEM — N40.0 BPH WITH ELEVATED PSA: Status: ACTIVE | Noted: 2019-04-30

## 2019-04-30 PROBLEM — K86.89 PANCREATIC MASS: Status: ACTIVE | Noted: 2017-04-02

## 2019-04-30 PROBLEM — E87.6 HYPOKALEMIA: Status: ACTIVE | Noted: 2017-04-03

## 2019-04-30 PROBLEM — R10.9 ABDOMINAL PAIN: Status: ACTIVE | Noted: 2017-04-02

## 2019-04-30 PROCEDURE — T1015 CLINIC SERVICE: HCPCS | Performed by: FAMILY MEDICINE

## 2019-04-30 RX ORDER — GABAPENTIN 600 MG/1
TABLET ORAL
Refills: 0 | COMMUNITY
Start: 2019-04-21 | End: 2020-01-07 | Stop reason: SDUPTHER

## 2019-04-30 RX ORDER — LISINOPRIL AND HYDROCHLOROTHIAZIDE 20; 12.5 MG/1; MG/1
TABLET ORAL
Refills: 0 | COMMUNITY
Start: 2019-04-25 | End: 2019-06-25 | Stop reason: SDUPTHER

## 2019-04-30 RX ORDER — GABAPENTIN 600 MG/1
600 TABLET ORAL 3 TIMES DAILY
COMMUNITY
End: 2019-04-30

## 2019-04-30 RX ORDER — TRAZODONE HYDROCHLORIDE 50 MG/1
50 TABLET ORAL
COMMUNITY
End: 2020-01-07 | Stop reason: SDUPTHER

## 2019-05-03 ENCOUNTER — DOCUMENTATION (OUTPATIENT)
Dept: FAMILY MEDICINE CLINIC | Facility: HOME HEALTHCARE | Age: 59
End: 2019-05-03

## 2019-05-07 DIAGNOSIS — K76.9 LIVER LESION: Primary | ICD-10-CM

## 2019-05-09 ENCOUNTER — TELEPHONE (OUTPATIENT)
Dept: FAMILY MEDICINE CLINIC | Facility: HOME HEALTHCARE | Age: 59
End: 2019-05-09

## 2019-05-09 DIAGNOSIS — I10 ESSENTIAL HYPERTENSION: Primary | ICD-10-CM

## 2019-05-09 RX ORDER — CARVEDILOL 3.12 MG/1
3.12 TABLET ORAL 2 TIMES DAILY WITH MEALS
Qty: 60 TABLET | Refills: 0 | Status: SHIPPED | OUTPATIENT
Start: 2019-05-09 | End: 2019-06-25 | Stop reason: SDUPTHER

## 2019-05-15 ENCOUNTER — HOSPITAL ENCOUNTER (EMERGENCY)
Facility: HOSPITAL | Age: 59
Discharge: HOME | End: 2019-05-16
Attending: EMERGENCY MEDICINE
Payer: COMMERCIAL

## 2019-05-15 ENCOUNTER — APPOINTMENT (EMERGENCY)
Dept: CT IMAGING | Facility: HOSPITAL | Age: 59
End: 2019-05-15
Payer: COMMERCIAL

## 2019-05-15 DIAGNOSIS — F10.229 ACUTE ALCOHOL INTOXICATION WITH ALCOHOLISM (HCC): ICD-10-CM

## 2019-05-15 DIAGNOSIS — S00.03XA SCALP HEMATOMA, INITIAL ENCOUNTER: ICD-10-CM

## 2019-05-15 DIAGNOSIS — T07.XXXA MULTIPLE ABRASIONS: ICD-10-CM

## 2019-05-15 DIAGNOSIS — S09.90XA HEAD INJURY DUE TO TRAUMA: ICD-10-CM
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
## 2019-05-15 DIAGNOSIS — S01.01XA SCALP LACERATION, INITIAL ENCOUNTER: ICD-10-CM

## 2019-05-15 LAB
ALBUMIN SERPL BCP-MCNC: 3.2 G/DL (ref 3.5–5)
ALP SERPL-CCNC: 81 U/L (ref 46–116)
ALT SERPL W P-5'-P-CCNC: 22 U/L (ref 12–78)
ANION GAP SERPL CALCULATED.3IONS-SCNC: 10 MMOL/L (ref 4–13)
APTT PPP: 34 SECONDS (ref 26–38)
AST SERPL W P-5'-P-CCNC: 39 U/L (ref 5–45)
BASOPHILS # BLD AUTO: 0.05 THOUSANDS/ΜL (ref 0–0.1)
BASOPHILS NFR BLD AUTO: 1 % (ref 0–1)
BILIRUB SERPL-MCNC: 1.1 MG/DL (ref 0.2–1)
BILIRUB UR QL STRIP: NEGATIVE
BUN SERPL-MCNC: 11 MG/DL (ref 5–25)
CALCIUM SERPL-MCNC: 8.7 MG/DL (ref 8.3–10.1)
CHLORIDE SERPL-SCNC: 101 MMOL/L (ref 100–108)
CLARITY UR: CLEAR
CO2 SERPL-SCNC: 26 MMOL/L (ref 21–32)
COLOR UR: YELLOW
CREAT SERPL-MCNC: 0.84 MG/DL (ref 0.6–1.3)
EOSINOPHIL # BLD AUTO: 0.07 THOUSAND/ΜL (ref 0–0.61)
EOSINOPHIL NFR BLD AUTO: 1 % (ref 0–6)
ERYTHROCYTE [DISTWIDTH] IN BLOOD BY AUTOMATED COUNT: 15.7 % (ref 11.6–15.1)
ETHANOL SERPL-MCNC: 286 MG/DL (ref 0–3)
GFR SERPL CREATININE-BSD FRML MDRD: 97 ML/MIN/1.73SQ M
GLUCOSE SERPL-MCNC: 106 MG/DL (ref 65–140)
GLUCOSE UR STRIP-MCNC: NEGATIVE MG/DL
HCT VFR BLD AUTO: 43.6 % (ref 36.5–49.3)
HGB BLD-MCNC: 13.8 G/DL (ref 12–17)
HGB UR QL STRIP.AUTO: NEGATIVE
IMM GRANULOCYTES # BLD AUTO: 0.03 THOUSAND/UL (ref 0–0.2)
IMM GRANULOCYTES NFR BLD AUTO: 0 % (ref 0–2)
INR PPP: 1.24 (ref 0.86–1.17)
KETONES UR STRIP-MCNC: NEGATIVE MG/DL
LEUKOCYTE ESTERASE UR QL STRIP: NEGATIVE
LYMPHOCYTES # BLD AUTO: 1.11 THOUSANDS/ΜL (ref 0.6–4.47)
LYMPHOCYTES NFR BLD AUTO: 16 % (ref 14–44)
MCH RBC QN AUTO: 29.6 PG (ref 26.8–34.3)
MCHC RBC AUTO-ENTMCNC: 31.7 G/DL (ref 31.4–37.4)
MCV RBC AUTO: 94 FL (ref 82–98)
MONOCYTES # BLD AUTO: 0.57 THOUSAND/ΜL (ref 0.17–1.22)
MONOCYTES NFR BLD AUTO: 8 % (ref 4–12)
NEUTROPHILS # BLD AUTO: 4.94 THOUSANDS/ΜL (ref 1.85–7.62)
NEUTS SEG NFR BLD AUTO: 74 % (ref 43–75)
NITRITE UR QL STRIP: NEGATIVE
NRBC BLD AUTO-RTO: 0 /100 WBCS
PH UR STRIP.AUTO: 6.5 [PH]
PLATELET # BLD AUTO: 100 THOUSANDS/UL (ref 149–390)
PMV BLD AUTO: 10 FL (ref 8.9–12.7)
POTASSIUM SERPL-SCNC: 3.6 MMOL/L (ref 3.5–5.3)
PROT SERPL-MCNC: 7.3 G/DL (ref 6.4–8.2)
PROT UR STRIP-MCNC: NEGATIVE MG/DL
PROTHROMBIN TIME: 15 SECONDS (ref 11.8–14.2)
RBC # BLD AUTO: 4.66 MILLION/UL (ref 3.88–5.62)
SODIUM SERPL-SCNC: 137 MMOL/L (ref 136–145)
SP GR UR STRIP.AUTO: <=1.005 (ref 1–1.03)
UROBILINOGEN UR QL STRIP.AUTO: 0.2 E.U./DL
WBC # BLD AUTO: 6.77 THOUSAND/UL (ref 4.31–10.16)

## 2019-05-15 PROCEDURE — 81003 URINALYSIS AUTO W/O SCOPE: CPT | Performed by: EMERGENCY MEDICINE

## 2019-05-15 PROCEDURE — 99283 EMERGENCY DEPT VISIT LOW MDM: CPT | Performed by: EMERGENCY MEDICINE

## 2019-05-15 PROCEDURE — 36415 COLL VENOUS BLD VENIPUNCTURE: CPT | Performed by: EMERGENCY MEDICINE

## 2019-05-15 PROCEDURE — 85610 PROTHROMBIN TIME: CPT | Performed by: EMERGENCY MEDICINE

## 2019-05-15 PROCEDURE — 96361 HYDRATE IV INFUSION ADD-ON: CPT

## 2019-05-15 PROCEDURE — 80320 DRUG SCREEN QUANTALCOHOLS: CPT | Performed by: EMERGENCY MEDICINE

## 2019-05-15 PROCEDURE — 85025 COMPLETE CBC W/AUTO DIFF WBC: CPT | Performed by: EMERGENCY MEDICINE

## 2019-05-15 PROCEDURE — 96360 HYDRATION IV INFUSION INIT: CPT

## 2019-05-15 PROCEDURE — 85730 THROMBOPLASTIN TIME PARTIAL: CPT | Performed by: EMERGENCY MEDICINE

## 2019-05-15 PROCEDURE — 99285 EMERGENCY DEPT VISIT HI MDM: CPT

## 2019-05-15 PROCEDURE — 80053 COMPREHEN METABOLIC PANEL: CPT | Performed by: EMERGENCY MEDICINE

## 2019-05-15 PROCEDURE — 72125 CT NECK SPINE W/O DYE: CPT

## 2019-05-15 PROCEDURE — 12002 RPR S/N/AX/GEN/TRNK2.6-7.5CM: CPT | Performed by: EMERGENCY MEDICINE

## 2019-05-15 PROCEDURE — 70450 CT HEAD/BRAIN W/O DYE: CPT

## 2019-05-15 RX ORDER — LIDOCAINE HYDROCHLORIDE AND EPINEPHRINE 10; 10 MG/ML; UG/ML
10 INJECTION, SOLUTION INFILTRATION; PERINEURAL ONCE
Status: DISCONTINUED | OUTPATIENT
Start: 2019-05-15 | End: 2019-05-15

## 2019-05-15 RX ORDER — SODIUM CHLORIDE 9 MG/ML
125 INJECTION, SOLUTION INTRAVENOUS CONTINUOUS
Status: DISCONTINUED | OUTPATIENT
Start: 2019-05-15 | End: 2019-05-16 | Stop reason: HOSPADM

## 2019-05-15 RX ADMIN — SODIUM CHLORIDE 1000 ML: 0.9 INJECTION, SOLUTION INTRAVENOUS at 19:47

## 2019-05-15 RX ADMIN — SODIUM CHLORIDE 125 ML/HR: 0.9 INJECTION, SOLUTION INTRAVENOUS at 21:48

## 2019-05-16 VITALS
HEART RATE: 64 BPM | TEMPERATURE: 97.8 F | SYSTOLIC BLOOD PRESSURE: 133 MMHG | WEIGHT: 214.07 LBS | OXYGEN SATURATION: 92 % | BODY MASS INDEX: 29.03 KG/M2 | DIASTOLIC BLOOD PRESSURE: 88 MMHG | RESPIRATION RATE: 20 BRPM

## 2019-05-17 DIAGNOSIS — K21.9 GASTROESOPHAGEAL REFLUX DISEASE, ESOPHAGITIS PRESENCE NOT SPECIFIED: ICD-10-CM

## 2019-05-20 RX ORDER — PANTOPRAZOLE SODIUM 40 MG/1
40 TABLET, DELAYED RELEASE ORAL
Qty: 30 TABLET | Refills: 0 | Status: SHIPPED | OUTPATIENT
Start: 2019-05-20 | End: 2019-06-25 | Stop reason: SDUPTHER

## 2019-05-30 ENCOUNTER — OFFICE VISIT (OUTPATIENT)
Dept: FAMILY MEDICINE CLINIC | Facility: HOME HEALTHCARE | Age: 59
End: 2019-05-30
Payer: COMMERCIAL

## 2019-05-30 VITALS
SYSTOLIC BLOOD PRESSURE: 138 MMHG | HEART RATE: 86 BPM | HEIGHT: 72 IN | TEMPERATURE: 97.2 F | OXYGEN SATURATION: 98 % | BODY MASS INDEX: 26.41 KG/M2 | WEIGHT: 195 LBS | RESPIRATION RATE: 18 BRPM | DIASTOLIC BLOOD PRESSURE: 86 MMHG

## 2019-05-30 DIAGNOSIS — Z48.02 VISIT FOR SUTURE REMOVAL: Primary | ICD-10-CM

## 2019-05-30 PROCEDURE — T1015 CLINIC SERVICE: HCPCS | Performed by: FAMILY MEDICINE

## 2019-06-03 ENCOUNTER — TELEPHONE (OUTPATIENT)
Dept: FAMILY MEDICINE CLINIC | Facility: CLINIC | Age: 59
End: 2019-06-03

## 2019-06-25 DIAGNOSIS — I10 ESSENTIAL HYPERTENSION: ICD-10-CM

## 2019-06-25 DIAGNOSIS — K21.9 GASTROESOPHAGEAL REFLUX DISEASE, ESOPHAGITIS PRESENCE NOT SPECIFIED: ICD-10-CM

## 2019-06-25 RX ORDER — LISINOPRIL AND HYDROCHLOROTHIAZIDE 20; 12.5 MG/1; MG/1
1 TABLET ORAL DAILY
Qty: 90 TABLET | Refills: 0 | Status: SHIPPED | OUTPATIENT
Start: 2019-06-25 | End: 2019-10-23 | Stop reason: SDUPTHER

## 2019-06-25 RX ORDER — PANTOPRAZOLE SODIUM 40 MG/1
40 TABLET, DELAYED RELEASE ORAL
Qty: 90 TABLET | Refills: 0 | Status: SHIPPED | OUTPATIENT
Start: 2019-06-25 | End: 2019-07-18 | Stop reason: SDUPTHER

## 2019-06-25 RX ORDER — CARVEDILOL 3.12 MG/1
3.12 TABLET ORAL 2 TIMES DAILY WITH MEALS
Qty: 180 TABLET | Refills: 0 | Status: SHIPPED | OUTPATIENT
Start: 2019-06-25 | End: 2019-10-23 | Stop reason: SDUPTHER

## 2019-07-18 DIAGNOSIS — M25.572 LEFT ANKLE PAIN, UNSPECIFIED CHRONICITY: ICD-10-CM

## 2019-07-18 DIAGNOSIS — M25.571 RIGHT ANKLE PAIN, UNSPECIFIED CHRONICITY: Primary | ICD-10-CM

## 2019-07-18 DIAGNOSIS — K21.9 GASTROESOPHAGEAL REFLUX DISEASE, ESOPHAGITIS PRESENCE NOT SPECIFIED: ICD-10-CM

## 2019-07-18 RX ORDER — PANTOPRAZOLE SODIUM 40 MG/1
40 TABLET, DELAYED RELEASE ORAL
Qty: 90 TABLET | Refills: 0 | Status: SHIPPED | OUTPATIENT
Start: 2019-07-18 | End: 2020-03-20 | Stop reason: ALTCHOICE

## 2019-08-28 ENCOUNTER — HOSPITAL ENCOUNTER (OUTPATIENT)
Dept: MRI IMAGING | Facility: HOSPITAL | Age: 59
Discharge: HOME | End: 2019-08-28
Payer: COMMERCIAL

## 2019-08-28 DIAGNOSIS — K76.9 LIVER LESION: ICD-10-CM

## 2019-08-28 PROCEDURE — 74183 MRI ABD W/O CNTR FLWD CNTR: CPT

## 2019-08-28 PROCEDURE — A9585 GADOBUTROL INJECTION: HCPCS | Performed by: NURSE PRACTITIONER

## 2019-08-28 RX ADMIN — GADOBUTROL 8 ML: 604.72 INJECTION INTRAVENOUS at 15:11

## 2019-09-04 ENCOUNTER — TELEPHONE (OUTPATIENT)
Dept: FAMILY MEDICINE CLINIC | Facility: CLINIC | Age: 59
End: 2019-09-04

## 2019-09-04 NOTE — TELEPHONE ENCOUNTER
He is a patient at [facility], Eliud Aburto, I sent the MRI to the providers in the clinic, thanks!

## 2019-09-05 ENCOUNTER — DOCUMENTATION (OUTPATIENT)
Dept: FAMILY MEDICINE CLINIC | Facility: HOME HEALTHCARE | Age: 59
End: 2019-09-05

## 2019-09-05 NOTE — PROGRESS NOTES
I tried to call patient but got no answer left message for patient to call the office about his MRI reults<   Patient does have an apt on 09/09  If I don't hear back from him I will make patient aware on the day of his apt  Ty NMG  ===View-only below this line===    ----- Message -----  From: KAIT Joe  Sent: 9/4/2019   8:30 AM EDT  To: Gearold Rubinstein, MA    Please advise of results, recommendation is to follow up in 3 months

## 2019-09-09 PROBLEM — K76.89 LIVER NODULE: Status: ACTIVE | Noted: 2019-09-09

## 2019-10-17 ENCOUNTER — TELEPHONE (OUTPATIENT)
Dept: PULMONOLOGY | Facility: HOSPITAL | Age: 59
End: 2019-10-17

## 2019-10-17 DIAGNOSIS — R93.5 ABNORMAL MRI OF ABDOMEN: Primary | ICD-10-CM

## 2019-10-17 NOTE — TELEPHONE ENCOUNTER
Attempted to call Dulce Ro with his MRI results of abdomen from August   No answer left message that we are setting up a gastroenterology follow-up for further evaluation of his abnormal results  Told to call [address] [place] [address] office with any questions  Sabine Angelo to call patient to set up GI appointment

## 2019-10-23 DIAGNOSIS — I10 ESSENTIAL HYPERTENSION: ICD-10-CM

## 2019-10-23 RX ORDER — CARVEDILOL 3.12 MG/1
3.12 TABLET ORAL 2 TIMES DAILY WITH MEALS
Qty: 180 TABLET | Refills: 0 | Status: SHIPPED | OUTPATIENT
Start: 2019-10-23 | End: 2020-01-07 | Stop reason: SDUPTHER

## 2019-10-23 RX ORDER — LISINOPRIL AND HYDROCHLOROTHIAZIDE 20; 12.5 MG/1; MG/1
1 TABLET ORAL DAILY
Qty: 90 TABLET | Refills: 0 | Status: SHIPPED | OUTPATIENT
Start: 2019-10-23 | End: 2020-01-07 | Stop reason: SDUPTHER

## 2019-10-29 ENCOUNTER — OFFICE VISIT (OUTPATIENT)
Dept: FAMILY MEDICINE CLINIC | Facility: HOME HEALTHCARE | Age: 59
End: 2019-10-29
Payer: COMMERCIAL

## 2019-10-29 VITALS
TEMPERATURE: 98 F | SYSTOLIC BLOOD PRESSURE: 152 MMHG | OXYGEN SATURATION: 98 % | DIASTOLIC BLOOD PRESSURE: 98 MMHG | WEIGHT: 185 LBS | HEART RATE: 84 BPM | RESPIRATION RATE: 18 BRPM | HEIGHT: 72 IN | BODY MASS INDEX: 25.06 KG/M2

## 2019-10-29 DIAGNOSIS — I10 ESSENTIAL HYPERTENSION: ICD-10-CM

## 2019-10-29 DIAGNOSIS — Z23 NEED FOR PNEUMOCOCCAL VACCINATION: ICD-10-CM

## 2019-10-29 DIAGNOSIS — Z13.228 SCREENING FOR METABOLIC DISORDER: Primary | ICD-10-CM

## 2019-10-29 DIAGNOSIS — Z23 NEED FOR INFLUENZA VACCINATION: ICD-10-CM

## 2019-10-29 DIAGNOSIS — K76.89 LIVER NODULE: ICD-10-CM

## 2019-10-29 PROCEDURE — T1015 CLINIC SERVICE: HCPCS | Performed by: FAMILY MEDICINE

## 2019-10-29 NOTE — PATIENT INSTRUCTIONS
Thoroughly discussed importance of following up with GI  Call with any abnormal lab results  Follow-up in 1 month
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
## 2019-11-26 ENCOUNTER — APPOINTMENT (EMERGENCY)
Dept: RADIOLOGY | Facility: HOSPITAL | Age: 59
End: 2019-11-26
Payer: MEDICARE

## 2019-11-26 ENCOUNTER — APPOINTMENT (EMERGENCY)
Dept: CT IMAGING | Facility: HOSPITAL | Age: 59
End: 2019-11-26
Payer: MEDICARE

## 2019-11-26 ENCOUNTER — HOSPITAL ENCOUNTER (INPATIENT)
Facility: HOSPITAL | Age: 59
LOS: 1 days | Discharge: HOME | DRG: 124 | End: 2019-11-27
Attending: SURGERY | Admitting: SURGERY
Payer: MEDICARE

## 2019-11-26 ENCOUNTER — HOSPITAL ENCOUNTER (EMERGENCY)
Facility: HOSPITAL | Age: 59
Discharge: ANOTHER HOSPITAL | End: 2019-11-26
Attending: EMERGENCY MEDICINE | Admitting: EMERGENCY MEDICINE
Payer: MEDICARE

## 2019-11-26 VITALS
BODY MASS INDEX: 25.56 KG/M2 | WEIGHT: 188.71 LBS | RESPIRATION RATE: 15 BRPM | HEIGHT: 72 IN | OXYGEN SATURATION: 98 % | SYSTOLIC BLOOD PRESSURE: 103 MMHG | DIASTOLIC BLOOD PRESSURE: 66 MMHG | HEART RATE: 74 BPM | TEMPERATURE: 98.3 F

## 2019-11-26 DIAGNOSIS — S02.82XA: Primary | ICD-10-CM

## 2019-11-26 DIAGNOSIS — S02.32XA CLOSED FRACTURE OF LEFT ORBITAL FLOOR (HCC): ICD-10-CM

## 2019-11-26 DIAGNOSIS — R46.89 COMBATIVE BEHAVIOR: ICD-10-CM
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
## 2019-11-26 DIAGNOSIS — S02.92XA FACIAL FRACTURE (HCC): ICD-10-CM

## 2019-11-26 DIAGNOSIS — F10.929 ALCOHOL INTOXICATION (HCC): ICD-10-CM

## 2019-11-26 LAB
ALBUMIN SERPL BCP-MCNC: 3.4 G/DL (ref 3.5–5)
ALP SERPL-CCNC: 85 U/L (ref 46–116)
ALT SERPL W P-5'-P-CCNC: 33 U/L (ref 12–78)
ANION GAP SERPL CALCULATED.3IONS-SCNC: 12 MMOL/L (ref 4–13)
APAP SERPL-MCNC: <2 UG/ML (ref 10–20)
APTT PPP: 34 SECONDS (ref 23–37)
AST SERPL W P-5'-P-CCNC: 60 U/L (ref 5–45)
BASOPHILS # BLD AUTO: 0.09 THOUSANDS/ΜL (ref 0–0.1)
BASOPHILS NFR BLD AUTO: 1 % (ref 0–1)
BILIRUB SERPL-MCNC: 1.1 MG/DL (ref 0.2–1)
BUN SERPL-MCNC: 11 MG/DL (ref 5–25)
CALCIUM SERPL-MCNC: 8.1 MG/DL (ref 8.3–10.1)
CHLORIDE SERPL-SCNC: 101 MMOL/L (ref 100–108)
CO2 SERPL-SCNC: 25 MMOL/L (ref 21–32)
CREAT SERPL-MCNC: 0.75 MG/DL (ref 0.6–1.3)
EOSINOPHIL # BLD AUTO: 0.13 THOUSAND/ΜL (ref 0–0.61)
EOSINOPHIL NFR BLD AUTO: 2 % (ref 0–6)
ERYTHROCYTE [DISTWIDTH] IN BLOOD BY AUTOMATED COUNT: 17.2 % (ref 11.6–15.1)
ETHANOL SERPL-MCNC: 394 MG/DL (ref 0–3)
GFR SERPL CREATININE-BSD FRML MDRD: 102 ML/MIN/1.73SQ M
GLUCOSE SERPL-MCNC: 111 MG/DL (ref 65–140)
HCT VFR BLD AUTO: 43.9 % (ref 36.5–49.3)
HGB BLD-MCNC: 13.7 G/DL (ref 12–17)
IMM GRANULOCYTES # BLD AUTO: 0.03 THOUSAND/UL (ref 0–0.2)
IMM GRANULOCYTES NFR BLD AUTO: 0 % (ref 0–2)
INR PPP: 1.16 (ref 0.84–1.19)
LYMPHOCYTES # BLD AUTO: 1.79 THOUSANDS/ΜL (ref 0.6–4.47)
LYMPHOCYTES NFR BLD AUTO: 23 % (ref 14–44)
MCH RBC QN AUTO: 28 PG (ref 26.8–34.3)
MCHC RBC AUTO-ENTMCNC: 31.2 G/DL (ref 31.4–37.4)
MCV RBC AUTO: 90 FL (ref 82–98)
MONOCYTES # BLD AUTO: 0.81 THOUSAND/ΜL (ref 0.17–1.22)
MONOCYTES NFR BLD AUTO: 10 % (ref 4–12)
NEUTROPHILS # BLD AUTO: 5.03 THOUSANDS/ΜL (ref 1.85–7.62)
NEUTS SEG NFR BLD AUTO: 64 % (ref 43–75)
NRBC BLD AUTO-RTO: 0 /100 WBCS
PLATELET # BLD AUTO: 158 THOUSANDS/UL (ref 149–390)
PMV BLD AUTO: 9.7 FL (ref 8.9–12.7)
POTASSIUM SERPL-SCNC: 3.3 MMOL/L (ref 3.5–5.3)
PROT SERPL-MCNC: 7.8 G/DL (ref 6.4–8.2)
PROTHROMBIN TIME: 14.9 SECONDS (ref 11.6–14.5)
RBC # BLD AUTO: 4.9 MILLION/UL (ref 3.88–5.62)
SALICYLATES SERPL-MCNC: 3.2 MG/DL (ref 3–20)
SODIUM SERPL-SCNC: 138 MMOL/L (ref 136–145)
WBC # BLD AUTO: 7.88 THOUSAND/UL (ref 4.31–10.16)

## 2019-11-26 PROCEDURE — 72125 CT NECK SPINE W/O DYE: CPT

## 2019-11-26 PROCEDURE — 94664 DEMO&/EVAL PT USE INHALER: CPT

## 2019-11-26 PROCEDURE — 94002 VENT MGMT INPAT INIT DAY: CPT

## 2019-11-26 PROCEDURE — 94760 N-INVAS EAR/PLS OXIMETRY 1: CPT

## 2019-11-26 PROCEDURE — 96376 TX/PRO/DX INJ SAME DRUG ADON: CPT

## 2019-11-26 PROCEDURE — 80320 DRUG SCREEN QUANTALCOHOLS: CPT | Performed by: EMERGENCY MEDICINE

## 2019-11-26 PROCEDURE — 85610 PROTHROMBIN TIME: CPT | Performed by: EMERGENCY MEDICINE

## 2019-11-26 PROCEDURE — 99285 EMERGENCY DEPT VISIT HI MDM: CPT

## 2019-11-26 PROCEDURE — 99285 EMERGENCY DEPT VISIT HI MDM: CPT | Performed by: EMERGENCY MEDICINE

## 2019-11-26 PROCEDURE — 80053 COMPREHEN METABOLIC PANEL: CPT | Performed by: EMERGENCY MEDICINE

## 2019-11-26 PROCEDURE — 96374 THER/PROPH/DIAG INJ IV PUSH: CPT

## 2019-11-26 PROCEDURE — 96365 THER/PROPH/DIAG IV INF INIT: CPT

## 2019-11-26 PROCEDURE — 74177 CT ABD & PELVIS W/CONTRAST: CPT

## 2019-11-26 PROCEDURE — 70450 CT HEAD/BRAIN W/O DYE: CPT

## 2019-11-26 PROCEDURE — 36415 COLL VENOUS BLD VENIPUNCTURE: CPT | Performed by: EMERGENCY MEDICINE

## 2019-11-26 PROCEDURE — 70486 CT MAXILLOFACIAL W/O DYE: CPT

## 2019-11-26 PROCEDURE — 71260 CT THORAX DX C+: CPT

## 2019-11-26 PROCEDURE — 80329 ANALGESICS NON-OPIOID 1 OR 2: CPT | Performed by: EMERGENCY MEDICINE

## 2019-11-26 PROCEDURE — 85730 THROMBOPLASTIN TIME PARTIAL: CPT | Performed by: EMERGENCY MEDICINE

## 2019-11-26 PROCEDURE — 31500 INSERT EMERGENCY AIRWAY: CPT | Performed by: EMERGENCY MEDICINE

## 2019-11-26 PROCEDURE — 85025 COMPLETE CBC W/AUTO DIFF WBC: CPT | Performed by: EMERGENCY MEDICINE

## 2019-11-26 RX ORDER — KETAMINE HYDROCHLORIDE 50 MG/ML
4 INJECTION, SOLUTION, CONCENTRATE INTRAMUSCULAR; INTRAVENOUS ONCE
Status: DISCONTINUED | OUTPATIENT
Start: 2019-11-26 | End: 2019-11-26 | Stop reason: HOSPADM

## 2019-11-26 RX ORDER — SODIUM CHLORIDE 9 MG/ML
1000 INJECTION, SOLUTION INTRAVENOUS ONCE
Status: COMPLETED | OUTPATIENT
Start: 2019-11-26 | End: 2019-11-26

## 2019-11-26 RX ORDER — FENTANYL CITRATE 50 UG/ML
100 INJECTION, SOLUTION INTRAMUSCULAR; INTRAVENOUS ONCE
Status: COMPLETED | OUTPATIENT
Start: 2019-11-26 | End: 2019-11-26

## 2019-11-26 RX ORDER — FENTANYL CITRATE 50 UG/ML
INJECTION, SOLUTION INTRAMUSCULAR; INTRAVENOUS
Status: DISCONTINUED
Start: 2019-11-26 | End: 2019-11-26 | Stop reason: HOSPADM

## 2019-11-26 RX ORDER — KETAMINE HCL IN NACL, ISO-OSM 100MG/10ML
2 SYRINGE (ML) INJECTION ONCE
Status: COMPLETED | OUTPATIENT
Start: 2019-11-26 | End: 2019-11-26

## 2019-11-26 RX ORDER — CHLORHEXIDINE GLUCONATE 0.12 MG/ML
15 RINSE ORAL EVERY 12 HOURS SCHEDULED
Status: DISCONTINUED | OUTPATIENT
Start: 2019-11-27 | End: 2019-11-27 | Stop reason: HOSPADM

## 2019-11-26 RX ORDER — ONDANSETRON 2 MG/ML
1 INJECTION INTRAMUSCULAR; INTRAVENOUS ONCE
Status: COMPLETED | OUTPATIENT
Start: 2019-11-26 | End: 2019-11-26

## 2019-11-26 RX ORDER — PROPOFOL 10 MG/ML
50 INJECTION, EMULSION INTRAVENOUS AS NEEDED
Status: DISCONTINUED | OUTPATIENT
Start: 2019-11-26 | End: 2019-11-26

## 2019-11-26 RX ORDER — PROPOFOL 10 MG/ML
5-50 INJECTION, EMULSION INTRAVENOUS
Status: DISCONTINUED | OUTPATIENT
Start: 2019-11-26 | End: 2019-11-26 | Stop reason: HOSPADM

## 2019-11-26 RX ORDER — FENTANYL CITRATE 50 UG/ML
100 INJECTION, SOLUTION INTRAMUSCULAR; INTRAVENOUS ONCE
Status: DISCONTINUED | OUTPATIENT
Start: 2019-11-26 | End: 2019-11-26 | Stop reason: HOSPADM

## 2019-11-26 RX ORDER — SUCCINYLCHOLINE/SOD CL,ISO/PF 100 MG/5ML
100 SYRINGE (ML) INTRAVENOUS ONCE
Status: COMPLETED | OUTPATIENT
Start: 2019-11-26 | End: 2019-11-26

## 2019-11-26 RX ORDER — ONDANSETRON 2 MG/ML
4 INJECTION INTRAMUSCULAR; INTRAVENOUS ONCE
Status: COMPLETED | OUTPATIENT
Start: 2019-11-26 | End: 2019-11-26

## 2019-11-26 RX ADMIN — Medication 171 MG: at 20:34

## 2019-11-26 RX ADMIN — ONDANSETRON 4 MG: 2 INJECTION INTRAMUSCULAR; INTRAVENOUS at 20:48

## 2019-11-26 RX ADMIN — FENTANYL CITRATE 100 MCG: 50 INJECTION, SOLUTION INTRAMUSCULAR; INTRAVENOUS at 21:28

## 2019-11-26 RX ADMIN — SODIUM CHLORIDE 1000 ML/HR: 0.9 INJECTION, SOLUTION INTRAVENOUS at 22:33

## 2019-11-26 RX ADMIN — Medication 50 MCG/HR: at 21:14

## 2019-11-26 RX ADMIN — PROPOFOL 100 MG: 10 INJECTION, EMULSION INTRAVENOUS at 21:24

## 2019-11-26 RX ADMIN — PROPOFOL 50 MG: 10 INJECTION, EMULSION INTRAVENOUS at 21:00

## 2019-11-26 RX ADMIN — PROPOFOL 50 MG: 10 INJECTION, EMULSION INTRAVENOUS at 20:56

## 2019-11-26 RX ADMIN — PROPOFOL 5 MCG/KG/MIN: 10 INJECTION, EMULSION INTRAVENOUS at 20:48

## 2019-11-26 RX ADMIN — FENTANYL CITRATE 100 MCG: 50 INJECTION INTRAMUSCULAR; INTRAVENOUS at 20:58

## 2019-11-26 RX ADMIN — IOHEXOL 100 ML: 350 INJECTION, SOLUTION INTRAVENOUS at 19:20

## 2019-11-26 RX ADMIN — Medication 100 MG: at 20:41

## 2019-11-27 ENCOUNTER — APPOINTMENT (INPATIENT)
Dept: CT IMAGING | Facility: HOSPITAL | Age: 59
DRG: 124 | End: 2019-11-27
Payer: MEDICARE

## 2019-11-27 ENCOUNTER — APPOINTMENT (INPATIENT)
Dept: RADIOLOGY | Facility: HOSPITAL | Age: 59
DRG: 124 | End: 2019-11-27
Payer: MEDICARE

## 2019-11-27 VITALS
TEMPERATURE: 98 F | RESPIRATION RATE: 12 BRPM | WEIGHT: 193.12 LBS | SYSTOLIC BLOOD PRESSURE: 164 MMHG | BODY MASS INDEX: 26.16 KG/M2 | HEIGHT: 72 IN | HEART RATE: 74 BPM | OXYGEN SATURATION: 93 % | DIASTOLIC BLOOD PRESSURE: 95 MMHG

## 2019-11-27 PROBLEM — F10.929 ALCOHOL INTOXICATION (HCC): Status: ACTIVE | Noted: 2019-11-27

## 2019-11-27 PROBLEM — S02.32XA CLOSED FRACTURE OF LEFT ORBITAL FLOOR (HCC): Status: ACTIVE | Noted: 2019-11-27

## 2019-11-27 PROBLEM — S02.82XA: Status: ACTIVE | Noted: 2019-11-27

## 2019-11-27 LAB
ABO GROUP BLD: NORMAL
ANION GAP SERPL CALCULATED.3IONS-SCNC: 4 MMOL/L (ref 4–13)
BASOPHILS # BLD AUTO: 0.03 THOUSANDS/ΜL (ref 0–0.1)
BASOPHILS NFR BLD AUTO: 0 % (ref 0–1)
BLD GP AB SCN SERPL QL: NEGATIVE
BUN SERPL-MCNC: 8 MG/DL (ref 5–25)
CALCIUM SERPL-MCNC: 7.5 MG/DL (ref 8.3–10.1)
CHLORIDE SERPL-SCNC: 114 MMOL/L (ref 100–108)
CO2 SERPL-SCNC: 23 MMOL/L (ref 21–32)
CREAT SERPL-MCNC: 0.67 MG/DL (ref 0.6–1.3)
EOSINOPHIL # BLD AUTO: 0.03 THOUSAND/ΜL (ref 0–0.61)
EOSINOPHIL NFR BLD AUTO: 0 % (ref 0–6)
ERYTHROCYTE [DISTWIDTH] IN BLOOD BY AUTOMATED COUNT: 17.3 % (ref 11.6–15.1)
GFR SERPL CREATININE-BSD FRML MDRD: 107 ML/MIN/1.73SQ M
GLUCOSE SERPL-MCNC: 88 MG/DL (ref 65–140)
HCT VFR BLD AUTO: 37.7 % (ref 36.5–49.3)
HGB BLD-MCNC: 11.9 G/DL (ref 12–17)
IMM GRANULOCYTES # BLD AUTO: 0.03 THOUSAND/UL (ref 0–0.2)
IMM GRANULOCYTES NFR BLD AUTO: 0 % (ref 0–2)
LYMPHOCYTES # BLD AUTO: 1.25 THOUSANDS/ΜL (ref 0.6–4.47)
LYMPHOCYTES NFR BLD AUTO: 18 % (ref 14–44)
MAGNESIUM SERPL-MCNC: 1.8 MG/DL (ref 1.6–2.6)
MCH RBC QN AUTO: 28.3 PG (ref 26.8–34.3)
MCHC RBC AUTO-ENTMCNC: 31.6 G/DL (ref 31.4–37.4)
MCV RBC AUTO: 90 FL (ref 82–98)
MONOCYTES # BLD AUTO: 0.61 THOUSAND/ΜL (ref 0.17–1.22)
MONOCYTES NFR BLD AUTO: 9 % (ref 4–12)
NEUTROPHILS # BLD AUTO: 4.99 THOUSANDS/ΜL (ref 1.85–7.62)
NEUTS SEG NFR BLD AUTO: 73 % (ref 43–75)
NRBC BLD AUTO-RTO: 0 /100 WBCS
PHOSPHATE SERPL-MCNC: 3.4 MG/DL (ref 2.7–4.5)
PLATELET # BLD AUTO: 118 THOUSANDS/UL (ref 149–390)
PMV BLD AUTO: 10.3 FL (ref 8.9–12.7)
POTASSIUM SERPL-SCNC: 5.1 MMOL/L (ref 3.5–5.3)
RBC # BLD AUTO: 4.21 MILLION/UL (ref 3.88–5.62)
RH BLD: POSITIVE
SODIUM SERPL-SCNC: 141 MMOL/L (ref 136–145)
SPECIMEN EXPIRATION DATE: NORMAL
WBC # BLD AUTO: 6.94 THOUSAND/UL (ref 4.31–10.16)

## 2019-11-27 PROCEDURE — 97116 GAIT TRAINING THERAPY: CPT

## 2019-11-27 PROCEDURE — 85025 COMPLETE CBC W/AUTO DIFF WBC: CPT | Performed by: EMERGENCY MEDICINE

## 2019-11-27 PROCEDURE — 84100 ASSAY OF PHOSPHORUS: CPT | Performed by: EMERGENCY MEDICINE

## 2019-11-27 PROCEDURE — 83735 ASSAY OF MAGNESIUM: CPT | Performed by: EMERGENCY MEDICINE

## 2019-11-27 PROCEDURE — NC001 PR NO CHARGE: Performed by: PHYSICIAN ASSISTANT

## 2019-11-27 PROCEDURE — 71045 X-RAY EXAM CHEST 1 VIEW: CPT

## 2019-11-27 PROCEDURE — 5A1935Z RESPIRATORY VENTILATION, LESS THAN 24 CONSECUTIVE HOURS: ICD-10-PCS | Performed by: EMERGENCY MEDICINE

## 2019-11-27 PROCEDURE — 97163 PT EVAL HIGH COMPLEX 45 MIN: CPT

## 2019-11-27 PROCEDURE — 94760 N-INVAS EAR/PLS OXIMETRY 1: CPT

## 2019-11-27 PROCEDURE — 80048 BASIC METABOLIC PNL TOTAL CA: CPT | Performed by: EMERGENCY MEDICINE

## 2019-11-27 PROCEDURE — G8978 MOBILITY CURRENT STATUS: HCPCS

## 2019-11-27 PROCEDURE — 86901 BLOOD TYPING SEROLOGIC RH(D): CPT | Performed by: EMERGENCY MEDICINE

## 2019-11-27 PROCEDURE — 94003 VENT MGMT INPAT SUBQ DAY: CPT

## 2019-11-27 PROCEDURE — 0BH17EZ INSERTION OF ENDOTRACHEAL AIRWAY INTO TRACHEA, VIA NATURAL OR ARTIFICIAL OPENING: ICD-10-PCS | Performed by: EMERGENCY MEDICINE

## 2019-11-27 PROCEDURE — 99223 1ST HOSP IP/OBS HIGH 75: CPT | Performed by: SURGERY

## 2019-11-27 PROCEDURE — 86900 BLOOD TYPING SEROLOGIC ABO: CPT | Performed by: EMERGENCY MEDICINE

## 2019-11-27 PROCEDURE — G8979 MOBILITY GOAL STATUS: HCPCS

## 2019-11-27 PROCEDURE — 86850 RBC ANTIBODY SCREEN: CPT | Performed by: EMERGENCY MEDICINE

## 2019-11-27 RX ORDER — MAGNESIUM SULFATE HEPTAHYDRATE 40 MG/ML
2 INJECTION, SOLUTION INTRAVENOUS ONCE
Status: COMPLETED | OUTPATIENT
Start: 2019-11-27 | End: 2019-11-27

## 2019-11-27 RX ORDER — THIAMINE MONONITRATE (VIT B1) 100 MG
100 TABLET ORAL DAILY
Status: DISCONTINUED | OUTPATIENT
Start: 2019-11-28 | End: 2019-11-27 | Stop reason: HOSPADM

## 2019-11-27 RX ORDER — PROPOFOL 10 MG/ML
5-50 INJECTION, EMULSION INTRAVENOUS
Status: DISCONTINUED | OUTPATIENT
Start: 2019-11-27 | End: 2019-11-27

## 2019-11-27 RX ORDER — PANTOPRAZOLE SODIUM 40 MG/1
40 TABLET, DELAYED RELEASE ORAL
Status: DISCONTINUED | OUTPATIENT
Start: 2019-11-28 | End: 2019-11-27 | Stop reason: HOSPADM

## 2019-11-27 RX ORDER — FENTANYL CITRATE-0.9 % NACL/PF 10 MCG/ML
100 PLASTIC BAG, INJECTION (ML) INTRAVENOUS CONTINUOUS
Status: DISCONTINUED | OUTPATIENT
Start: 2019-11-27 | End: 2019-11-27

## 2019-11-27 RX ORDER — FOLIC ACID 1 MG/1
1 TABLET ORAL DAILY
Status: DISCONTINUED | OUTPATIENT
Start: 2019-11-28 | End: 2019-11-27 | Stop reason: HOSPADM

## 2019-11-27 RX ORDER — BISACODYL 10 MG
10 SUPPOSITORY, RECTAL RECTAL DAILY PRN
Status: DISCONTINUED | OUTPATIENT
Start: 2019-11-27 | End: 2019-11-27 | Stop reason: HOSPADM

## 2019-11-27 RX ORDER — POTASSIUM CHLORIDE 14.9 MG/ML
20 INJECTION INTRAVENOUS
Status: COMPLETED | OUTPATIENT
Start: 2019-11-27 | End: 2019-11-27

## 2019-11-27 RX ORDER — ONDANSETRON 2 MG/ML
4 INJECTION INTRAMUSCULAR; INTRAVENOUS EVERY 6 HOURS PRN
Status: DISCONTINUED | OUTPATIENT
Start: 2019-11-27 | End: 2019-11-27 | Stop reason: HOSPADM

## 2019-11-27 RX ORDER — PANTOPRAZOLE SODIUM 40 MG/1
40 INJECTION, POWDER, FOR SOLUTION INTRAVENOUS
Status: DISCONTINUED | OUTPATIENT
Start: 2019-11-27 | End: 2019-11-27

## 2019-11-27 RX ORDER — SODIUM CHLORIDE, SODIUM GLUCONATE, SODIUM ACETATE, POTASSIUM CHLORIDE, MAGNESIUM CHLORIDE, SODIUM PHOSPHATE, DIBASIC, AND POTASSIUM PHOSPHATE .53; .5; .37; .037; .03; .012; .00082 G/100ML; G/100ML; G/100ML; G/100ML; G/100ML; G/100ML; G/100ML
125 INJECTION, SOLUTION INTRAVENOUS CONTINUOUS
Status: DISCONTINUED | OUTPATIENT
Start: 2019-11-27 | End: 2019-11-27

## 2019-11-27 RX ADMIN — PROPOFOL 40 MCG/KG/MIN: 10 INJECTION, EMULSION INTRAVENOUS at 07:12

## 2019-11-27 RX ADMIN — PROPOFOL 40 MCG/KG/MIN: 10 INJECTION, EMULSION INTRAVENOUS at 01:53

## 2019-11-27 RX ADMIN — Medication 100 MCG/HR: at 00:14

## 2019-11-27 RX ADMIN — Medication 100 MCG/HR: at 01:53

## 2019-11-27 RX ADMIN — MAGNESIUM SULFATE HEPTAHYDRATE 2 G: 40 INJECTION, SOLUTION INTRAVENOUS at 06:24

## 2019-11-27 RX ADMIN — POTASSIUM CHLORIDE 20 MEQ: 14.9 INJECTION, SOLUTION INTRAVENOUS at 01:20

## 2019-11-27 RX ADMIN — CHLORHEXIDINE GLUCONATE 0.12% ORAL RINSE 15 ML: 1.2 LIQUID ORAL at 00:36

## 2019-11-27 RX ADMIN — PROPOFOL 40 MCG/KG/MIN: 10 INJECTION, EMULSION INTRAVENOUS at 00:14

## 2019-11-27 RX ADMIN — POTASSIUM CHLORIDE 20 MEQ: 14.9 INJECTION, SOLUTION INTRAVENOUS at 00:33

## 2019-11-27 RX ADMIN — THIAMINE HYDROCHLORIDE: 100 INJECTION, SOLUTION INTRAMUSCULAR; INTRAVENOUS at 09:10

## 2019-11-27 RX ADMIN — CHLORHEXIDINE GLUCONATE 0.12% ORAL RINSE 15 ML: 1.2 LIQUID ORAL at 08:28

## 2019-11-27 RX ADMIN — SODIUM CHLORIDE, SODIUM GLUCONATE, SODIUM ACETATE, POTASSIUM CHLORIDE, MAGNESIUM CHLORIDE, SODIUM PHOSPHATE, DIBASIC, AND POTASSIUM PHOSPHATE 125 ML/HR: .53; .5; .37; .037; .03; .012; .00082 INJECTION, SOLUTION INTRAVENOUS at 01:48

## 2019-11-27 RX ADMIN — POTASSIUM CHLORIDE 20 MEQ: 14.9 INJECTION, SOLUTION INTRAVENOUS at 03:18
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
## 2019-11-27 NOTE — PHYSICAL THERAPY NOTE
Physical Therapy Evaluation     Patient's Name: Mandeep Guillen    Admitting Diagnosis  Multiple facial fractures ([address] ) [S02 92XA]    Problem List  Patient Active Problem List   Diagnosis    Alcohol abuse    Essential hypertension with goal blood pressure less than 140/90    Hypokalemia    Thrombocytopenia (HCC)    Acute kidney injury ([address] )    Elevated lactic acid level    Syncope    Pulmonary nodule    Hepatitis C    Lesion of spleen    Lymphadenopathy    Hepatic cirrhosis ([facility]    Internal hemorrhoid    Esophageal varices (HCC)    Tobacco abuse    Ambulatory dysfunction    Abdominal pain    Rectal bleed    Acute blood loss anemia    Alcohol use    Bipolar 1 disorder ([facility] )    BPH with elevated PSA    Dyslipidemia, goal LDL below 130    NSTEMI (non-ST elevated myocardial infarction) ([facility] )    Pancreatic mass    Seizure ([facility] )    Liver nodule    Fracture of other specified skull and facial bones, left side, initial encounter for closed fracture ([facility] )    Closed fracture of left orbital floor ([facility] )    Alcohol intoxication ([facility] )       Past Medical History  Past Medical History:   Diagnosis Date    Arthritis     Esophageal varices ([facility] )     Diagnosed in [facility] in 2018    GI bleed     2018    Hepatitis C     History of transfusion     Hyperlipidemia     Hypertension     Psychiatric disorder     bipolar    Renal disorder        Past Surgical History  Past Surgical History:   Procedure Laterality Date    COLON SURGERY      colonoscopy    FRACTURE SURGERY      left thumb        11/27/19 5520   Note Type   Note type Eval/Treat   Pain Assessment   Pain Assessment 0-10   Pain Score 3   Hospital Pain Intervention(s) Repositioned; Ambulation/increased activity   Response to Interventions tolerated   Home Living   Type of [address] One level;Elevator   Home Equipment Walker;Cane   Additional Comments Pt reports he was not using DME prior to admission   Prior Function Level of [place] Independent with ADLs and functional mobility   Lives With Alone   Receives Help From Other (Comment)  (s/o)   ADL Assistance Independent   IADLs Independent   Falls in the last 6 months 1 to 4  (pt reports aprox 4 in past 6 months  )   Vocational On disability   Comments Pt reports falls related to lightheadedness   Restrictions/Precautions   Weight Bearing Precautions Per Order No   Other Precautions Pain; Fall Risk;Telemetry;Multiple lines; Bed Alarm; Chair Alarm; Impulsive   General   Family/Caregiver Present No   Cognition   Orientation Level Oriented X4   RLE Assessment   RLE Assessment WFL   LLE Assessment   LLE Assessment WFL   Coordination   Movements are Fluid and Coordinated 0   Coordination and Movement Description impulsive   Bed Mobility   Supine to Sit 4  Minimal assistance   Additional items Assist x 1; Increased time required   Sit to Supine Unable to assess   Additional Comments Pt left resting in chair as requested, call bell in reach, alarm active   Transfers   Sit to Stand 5  Supervision   Additional items Assist x 1; Increased time required   Stand to Sit 5  Supervision   Additional items Assist x 1; Increased time required   Ambulation/Elevation   Gait pattern Excessively slow; Shuffling;Decreased foot clearance   Gait Assistance 4  Minimal assist   Additional items Assist x 1   Assistive Device None   Distance 20   Balance   Static Sitting Fair +   Dynamic Standing Poor +   Ambulatory Poor +   Endurance Deficit   Endurance Deficit Yes   Endurance Deficit Description limited by fatigue compared to baseline mobility   Activity Tolerance   Activity Tolerance Patient tolerated treatment well;Patient limited by fatigue   Nurse Made Aware yes, nsg gave clearance to work with pt   Assessment   Prognosis Good   Problem List Decreased endurance; Impaired balance;Decreased mobility; Decreased safety awareness;Decreased coordination;Pain;Orthopedic restrictions   Assessment Pt is 64 y o  male seen for PT evaluation s/p admit to [facility] on 11/26/2019 w/ Fracture of other specified skull and facial bones, left side, initial encounter for closed fracture ([address] )  PT consulted to assess pt's functional mobility and d/c needs  Order placed for PT eval and tx  Comorbidities affecting pt's physical performance at time of assessment include: multiple falls, reports of frequent lightheadedness  PTA, pt was ambulates community distances and elevations, lives alone in one level apartment on the fourth floor, elevator access and on disability  Personal factors affecting pt at time of IE include: unable to perform dynamic tasks in community, limited home support, positive fall history, limited insight into impairments, inability to perform IADLs and inability to perform ADLs  Please find objective findings from PT assessment regarding body systems outlined above with impairments and limitations including impaired balance, decreased endurance, gait deviations, pain, decreased activity tolerance, decreased functional mobility tolerance, decreased safety awareness and fall risk  Pt required pacing instruction for bed mobility although demonstrated ability to complete with minimal A  Tolerated sitting EOB without reports of dizziness  Ambulated to bathroom with impulsive although overall steady gait  The following objective measures performed on IE also reveal limitations: Barthel Index: 65/100  Pt's clinical presentation is currently unstable/unpredictable seen in pt's presentation of critical care monitoring  Pt to benefit from continued PT tx to address deficits as defined above and maximize level of functional independent mobility and consistency  From PT/mobility standpoint, recommendation at time of d/c would be home with increased support from family and s/o pending progress in order to facilitate return to PLOF     Barriers to Discharge Decreased caregiver support   Barriers to Discharge Comments Pt reports that s/o will stay with him at least through the weekend   Goals   Patient Goals To be home for Isidro RAUSCH Expiration Date 12/09/19   Short Term Goal #1 1  Complete bed mobility and transfers I to decrease need for caregiver in home  2  Ambulate 300' I to complete household and community mobility without A  3  Improve dynamic balance to good to decrease need for UE support during ambulation  Plan   Treatment/Interventions Spoke to nursing;Gait training;Bed mobility; Patient/family training; Endurance training;LE strengthening/ROM; Functional transfer training   PT Frequency Other (Comment)  (3-5x/wk)   Recommendation   Recommendation Home with family support   PT - OK to Discharge Yes  (when medically stable)   Barthel Index   Feeding 10   Bathing 0   Grooming Score 5   Dressing Score 5   Bladder Score 10   Bowels Score 10   Toilet Use Score 5   Transfers (Bed/Chair) Score 10   Mobility (Level Surface) Score 10   Stairs Score 0   Barthel Index Score 65          S:  "I would like a new gown, pants, and then to walk more"  O:  Ambulated 350' with very close S   A:  Pt continues to require instruction to improve safety and pacing during mobility with decreased impulsivity  Ambulated with overall steady gait without LOB  Pt very motivated to D/C prior to Isidro  Pt reports that his s/o will be able to stay with him through the weekend  P:  Continue to progress and improve safety while inpt  D/C home with increased support when medically stable            Moon Humphrey PT

## 2019-11-27 NOTE — RESPIRATORY THERAPY NOTE
RT Ventilator Management Note  Meka Wilson 64 y o  male MRN: [id number]  Unit/Bed#: ICU 02 Encounter: [id number]      Daily Screen       11/26/2019 2058             Patient safety screen outcome[de-identified]  Failed    Not Ready for Weaning due to[de-identified]  Underline problem not resolved; Recieving paralytics;Going on Transport intubated            Physical Exam:   Assessment Type: (P) Assess only  General Appearance: (P) Sedated  Respiratory Pattern: (P) Normal, Assisted  Chest Assessment: (P) Chest expansion symmetrical  Bilateral Breath Sounds: (P) Clear  O2 Device: (P) vent  Subjective Data: (P) intubated      Resp Comments: (P) Pts FIO2 dropped to 40% at this time, pt is maintaing sats at 100%  Will cont to monitor pt overnight  No other changes made to the vent

## 2019-11-27 NOTE — PLAN OF CARE
Problem: PAIN - ADULT  Goal: Verbalizes/displays adequate comfort level or baseline comfort level  Description  Interventions:  - Encourage patient to monitor pain and request assistance  - Assess pain using appropriate pain scale  - Administer analgesics based on type and severity of pain and evaluate response  - Implement non-pharmacological measures as appropriate and evaluate response  - Consider cultural and social influences on pain and pain management  - Notify physician/advanced practitioner if interventions unsuccessful or patient reports new pain  Outcome: Progressing     Problem: INFECTION - ADULT  Goal: Absence or prevention of progression during hospitalization  Description  INTERVENTIONS:  - Assess and monitor for signs and symptoms of infection  - Monitor lab/diagnostic results  - Monitor all insertion sites, i e  indwelling lines, tubes, and drains  - Monitor endotracheal if appropriate and nasal secretions for changes in amount and color  - [place] appropriate cooling/warming therapies per order  - Administer medications as ordered  - Instruct and encourage patient and family to use good hand hygiene technique  - Identify and instruct in appropriate isolation precautions for identified infection/condition  Outcome: Progressing  Goal: Absence of fever/infection during neutropenic period  Description  INTERVENTIONS:  - Monitor WBC    Outcome: Progressing     Problem: SAFETY ADULT  Goal: Patient will remain free of falls  Description  INTERVENTIONS:  - Assess patient frequently for physical needs  -  Identify cognitive and physical deficits and behaviors that affect risk of falls    -  [place] fall precautions as indicated by assessment   - Educate patient/family on patient safety including physical limitations  - Instruct patient to call for assistance with activity based on assessment  - Modify environment to reduce risk of injury  - Consider OT/PT consult to assist with strengthening/mobility  Outcome: Progressing  Goal: Maintain or return to baseline ADL function  Description  INTERVENTIONS:  -  Assess patient's ability to carry out ADLs; assess patient's baseline for ADL function and identify physical deficits which impact ability to perform ADLs (bathing, care of mouth/teeth, toileting, grooming, dressing, etc )  - Assess/evaluate cause of self-care deficits   - Assess range of motion  - Assess patient's mobility; develop plan if impaired  - Assess patient's need for assistive devices and provide as appropriate  - Encourage maximum independence but intervene and supervise when necessary  - Involve family in performance of ADLs  - Assess for home care needs following discharge   - Consider OT consult to assist with ADL evaluation and planning for discharge  - Provide patient education as appropriate  Outcome: Progressing  Goal: Maintain or return mobility status to optimal level  Description  INTERVENTIONS:  - Assess patient's baseline mobility status (ambulation, transfers, stairs, etc )    - Identify cognitive and physical deficits and behaviors that affect mobility  - Identify mobility aids required to assist with transfers and/or ambulation (gait belt, sit-to-stand, lift, walker, cane, etc )  - [place] fall precautions as indicated by assessment  - Record patient progress and toleration of activity level on Mobility SBAR; progress patient to next Phase/Stage  - Instruct patient to call for assistance with activity based on assessment  - Consider rehabilitation consult to assist with strengthening/weightbearing, etc   Outcome: Progressing     Problem: DISCHARGE PLANNING  Goal: Discharge to home or other facility with appropriate resources  Description  INTERVENTIONS:  - Identify barriers to discharge w/patient and caregiver  - Arrange for needed discharge resources and transportation as appropriate  - Identify discharge learning needs (meds, wound care, etc )  - Arrange for interpretive services to assist at discharge as needed  - Refer to Case Management Department for coordinating discharge planning if the patient needs post-hospital services based on physician/advanced practitioner order or complex needs related to functional status, cognitive ability, or social support system  Outcome: Progressing     Problem: Knowledge Deficit  Goal: Patient/family/caregiver demonstrates understanding of disease process, treatment plan, medications, and discharge instructions  Description  Complete learning assessment and assess knowledge base    Interventions:  - Provide teaching at level of understanding  - Provide teaching via preferred learning methods  Outcome: Progressing

## 2019-11-27 NOTE — RESPIRATORY THERAPY NOTE
Pt was extubated and placed on 4L NC and maintaining SAT of 98%  Breath sounds are diminish with no stridor  Pt is able to speak clearly and follow camands

## 2019-11-27 NOTE — H&P
History and Physical - Critical Care    Nick Serrato 64 y o  male MRN: [id number]  [address] [facility]   Unit/Bed#: ICU 81 Encounter: [id number]      Reason for Admission / Principal Problem: Fracture of other specified skull and facial bones, left side, initial encounter for closed fracture [facility])    HPI: Nick Serrato is a 64 y o  male with h/o etoh use, esophageal varices, GI bleed, HCV, who presents as a trauma transfer from [facility] for several left sided facial fractures after a fall with etoh on board  The only injuries noted at presentation were left eye hematoma and hematoma of right temporo-parietal skull  Was found yelling in parking lot  Nauseated and vomited  Initial GCS 13  Became acutely combated and agitated  Given multiple facial fractures on CT, was intubated for airway protection  History obtained from chart review  PMH:   Past Medical History:   Diagnosis Date    Arthritis     Esophageal varices ([address] )     Diagnosed in [facility] in 2018    GI bleed     2018    Hepatitis C     History of transfusion     Hyperlipidemia     Hypertension     Psychiatric disorder     bipolar    Renal disorder        PSH:   Past Surgical History:   Procedure Laterality Date    COLON SURGERY      colonoscopy    FRACTURE SURGERY      left thumb       Family History:   Family History   Problem Relation Age of Onset    Cancer Mother        Social History:   Social History     Tobacco Use   Smoking Status Current Every Day Smoker    Packs/day: 0 50   Smokeless Tobacco Current User    Types: Chew      Social History     Substance and Sexual Activity   Alcohol Use Yes    Frequency: 4 or more times a week    Drinks per session: 5 or 6    Binge frequency: Daily or almost daily    Comment: ashleigh michaels cans 6 a day      Marital Status: Single    ROS: 14 point ROS is unable to be obatined seconday to patient status  Allergies:    Allergies   Allergen Reactions    Hydrocodone-Acetaminophen Rash, Abdominal Pain and GI Intolerance       Home Medications:   Prior to Admission medications    Medication Sig Start Date End Date Taking? Authorizing Provider   carvedilol (COREG) 3 125 mg tablet Take 1 tablet (3 125 mg total) by mouth 2 (two) times a day with meals 10/23/19   KAIT Herr   gabapentin (NEURONTIN) 600 MG tablet  19   Historical Provider, MD   lisinopril-hydrochlorothiazide (PRINZIDE,ZESTORETIC) 20-12 5 MG per tablet Take 1 tablet by mouth daily 10/23/19   KAIT Herr   multivitamin [facility]) TABS Take 1 tablet by mouth daily    Historical Provider, MD   pantoprazole (PROTONIX) 40 mg tablet Take 1 tablet (40 mg total) by mouth daily in the early morning 19   KAIT [place]   traZODone (DESYREL) 100 mg tablet Take 150 mg by mouth daily at bedtime    Historical Provider, MD   traZODone (DESYREL) 50 mg tablet Take 50 mg by mouth    Historical Provider, MD       Vitals:   Vitals:    19 2348 19 0000   BP: 149/93 134/87   Pulse: 74 64   Resp: 16 16   Temp: 97 6 °F (36 4 °C)    TempSrc: Rectal    SpO2: 100% 100%   Weight: 87 4 kg (192 lb 10 9 oz)    Height: 6' (1 829 m)        Arterial Line:          Respiratory:  SpO2: SpO2: 100 %       Temperature: Temp (24hrs), Av °F (36 7 °C), Min:97 6 °F (36 4 °C), Max:98 3 °F (36 8 °C)  Current: Temperature: 97 6 °F (36 4 °C)    Weights: IBW: 77 6 kg  Body mass index is 26 13 kg/m²  Physical Exam:    Physical Exam   Constitutional: He appears well-developed and well-nourished  No distress  HENT:   Head: Normocephalic  Left eye large hematoma  Left sided skull hematoma  Eyes: Pupils are equal, round, and reactive to light  Conjunctivae are normal  Right eye exhibits no discharge  Left eye exhibits no discharge  Neck: Normal range of motion  No tracheal deviation present  Cardiovascular: Normal rate and regular rhythm  No murmur heard    Pulmonary/Chest: No respiratory distress  He has no wheezes  Course breath sounds b/l  Intubated  Abdominal: Soft  Bowel sounds are normal  He exhibits no distension  There is no tenderness  Musculoskeletal: He exhibits no edema or deformity  Neurological:   Sedated  Does not awaken to pain  Skin: Skin is warm and dry  No rash noted  He is not diaphoretic  No pallor  Psychiatric:   Unable to assess  Nursing note and vitals reviewed  Labs:   Results from last 7 days   Lab Units 19  1901   WBC Thousand/uL 7 88   HEMOGLOBIN g/dL 13 7   HEMATOCRIT % 43 9   PLATELETS Thousands/uL 158   NEUTROS PCT % 64   MONOS PCT % 10     Results from last 7 days   Lab Units 19  1901   SODIUM mmol/L 138   POTASSIUM mmol/L 3 3*   CHLORIDE mmol/L 101   CO2 mmol/L 25   BUN mg/dL 11   CREATININE mg/dL 0 75   CALCIUM mg/dL 8 1*   ALK PHOS U/L 85   ALT U/L 33   AST U/L 60*         Results from last 7 days   Lab Units 19  190   INR  1 16   PTT seconds 34                   Imagin/26 CTH:  PARENCHYMA:  No intracranial mass, mass effect or midline shift  No CT signs of acute infarction  No acute parenchymal hemorrhage      VENTRICLES AND EXTRA-AXIAL SPACES:  Normal for the patient's age      VISUALIZED ORBITS AND PARANASAL SINUSES:  For findings in the orbits and facial bones, please refer to the concurrent facial bone CT      CALVARIUM AND EXTRACRANIAL SOFT TISSUES:  Small right parietal scalp hematoma/contusion without underlying fracture      IMPRESSION:     Small right parietal scalp hematoma/contusion without an underlying skull fracture or acute intracranial abnormality   CT c spine:  FINDINGS:     ALIGNMENT:  Normal alignment of the cervical spine   No subluxation      VERTEBRAL BODIES:  No fracture      DEGENERATIVE CHANGES:  Stable mild multilevel cervical degenerative changes are noted without critical central canal stenosis      PREVERTEBRAL AND PARASPINAL SOFT TISSUES:  Unremarkable      THORACIC INLET: Normal      IMPRESSION:     No cervical spine fracture or traumatic malalignment  11/26 CT facial bones:  FACIAL BONES:  The left maxillary sinus is opacified with hemorrhage  There is gas in the left orbital extraconal soft tissues and soft tissue swelling overlying the left orbit  The pterygoid plates are intact  Multiple left-sided facial bone   fractures:      -Minimally displaced fracture of the left orbital floor (1100b/38) without herniation of fat or muscle  -Mildly displaced comminuted fracture of the left maxillary lateral wall (1100b/37 and 9/84)  -Minimally displaced fracture of the left maxillary medial wall (9/100)  -Minimally displaced, comminuted fracture of the left zygoma (9/99)  -Minimally displaced fracture of the left mandibular coronoid process (1100b/49)     No lytic or blastic lesion      SINUSES:  Otherwise normal      SOFT TISSUES:  Otherwise normal      IMPRESSION:     Multiple left-sided facial bone fractures, as detailed, including a minimally displaced left orbital floor fracture without evidence of fat or extraocular muscle entrapment  11/26 CT C/A/P:  CHEST     LUNGS:  Lungs are clear  There is no tracheal or endobronchial lesion      PLEURA:  Unremarkable      HEART/GREAT VESSELS:  Unremarkable for patient's age      MEDIASTINUM AND SHU:  Unremarkable      CHEST WALL AND LOWER NECK:   Unremarkable      ABDOMEN     LIVER/BILIARY TREE:  Cirrhosis without evidence of a focal lesion  No biliary ductal dilation      GALLBLADDER:  No calcified gallstones  No pericholecystic inflammatory change      SPLEEN:  Splenomegaly without a focal lesion      PANCREAS:  Unremarkable with splenic tissue again noted in the tail      ADRENAL GLANDS:  Unremarkable      KIDNEYS/URETERS:  Stable cyst in the lower pole of the right kidney   No hydronephrosis      STOMACH AND BOWEL:  Unremarkable      APPENDIX:  No findings to suggest appendicitis      ABDOMINOPELVIC CAVITY:  No ascites or free intraperitoneal air  No lymphadenopathy      VESSELS:  Unremarkable for patient's age      PELVIS     REPRODUCTIVE ORGANS:  Unremarkable for patient's age      URINARY BLADDER:  Unremarkable      ABDOMINAL WALL/INGUINAL REGIONS:  Unremarkable      OSSEOUS STRUCTURES:  No acute fracture or destructive osseous lesion  Healed right lateral 9th and left lateral 7th rib fractures  Bilateral L5 spondylolysis without spondylolisthesis      IMPRESSION:     1  No acute traumatic injury in the chest, abdomen or pelvis      2  Cirrhosis and splenomegaly without abdominopelvic ascites        Micro:  Blood Culture: No results found for: BLOODCX  Urine Culture: No results found for: URINECX  Sputum Culture: No components found for: SPUTUMCX  Wound Culure: No results found for: WOUNDCULT    Impression:  Principal Problem:    Fracture of other specified skull and facial bones, left side, initial encounter for closed fracture [facility])  Active Problems:    Closed fracture of left orbital floor ([facility] [address] )      Plan:    Neuro:   · Sedation plan: fentanyl and propofol drip  · RASS goal: -5 Unarousable and -2 Light Sedation  · Pain controlled with: fentanyl drip   · Delirium precautions  · CAM-ICU daily  · Trend neuro exam   · Facial trauma: left orbital floor frx, mildly displaced comminuted fracture of the left maxillary lateral wall, minimally displaced fracture of the left maxillary medial wall, minimally displaced, comminuted fracture of the left zygoma, minimally displaced fracture of the left mandibular coronoid process   · OMS evaluation in AM  · Ophthalmology evaluation in AM for orbital frx  · Intubated for airway protection in setting of multiple facial frx's and agitation   · H/o etoh use disorder  · CIWA  · Thiamine and folate IV  CV:   · HTN  · Hold home coreg 3 125 BID, lisinopril-HCTZ 20-12 5 in setting of sedation on propofol, currently normotensive   · Rhythm: NSR  · Follow rhythm on telemetry  Lung:   · Intubated for airway protection  · Wean ventilator as able  · SpO2 goal >92%  · Pulmonary toileting  GI:   · Protonix 40 mg IV daily (on protonix at home)  · Bowel regimen: prn dulcolax suppository   · Zofran PRN for nausea  · NPO pending OMS eval  FEN:   · Isolyte 125 cc/hr  · NPO pending OMS evaluation   · Replete electrolytes with goals: K >4 0, Mag >2 0, and Phos >3 0  :   · Indwelling Hare: yes  · Trend UOP and BUN/creat  · Strict I and O  ID:   · Trend temps and WBC count  · Maintain normothermia  Heme:   · Trend hgb and plts  · Transfuse as needed for goal hgb >7  Endo:   · No active issues   MSK/Skin:   · Frequent turning and pressure off-loading  · Local wound care as needed    Disposition: ICU admission  Given critical illness, patient length of stay will require greater than two midnights  VTE Pharmacologic Prophylaxis: Holding in setting of OMS eval, possible OR  VTE Mechanical Prophylaxis: sequential compression device    Invasive lines and devices: Invasive Devices     Peripheral Intravenous Line            Peripheral IV 11/26/19 Right Antecubital 1 day    Peripheral IV 11/26/19 Left Forearm less than 1 day    Peripheral IV 11/26/19 Right Forearm less than 1 day          Drain            NG/OG/Enteral Tube Orogastric 16 Fr Left mouth less than 1 day    Urethral Catheter Latex 16 Fr  less than 1 day          Airway            ETT  Hi-Lo; Cuffed; Inflated 8 mm less than 1 day                Code Status: Level 1 - Full Code    Counseling / Coordination of Care  Total Critical Care time spent 30 minutes excluding procedures, teaching and family updates            SIGNATURE: Savita Cantrell MD  DATE: November 27, 2019  TIME: 1:27 AM

## 2019-11-27 NOTE — TRAUMA DOCUMENTATION
Getting ready to intubate patient  DR Telly Ruano and Dr Gaby Schmitt at bedside, respiratory, nurse DEMETRIUS geronimo and Jag Booth RN  Suction set up, BVM at bedside

## 2019-11-27 NOTE — CONSULTS
Consultation - Oral and Maxillofacial Surgery    Vidhi Penaloza 64 y o  male MRN: [id number]  Unit/Bed#: ICU 02 Encounter: [id number]    Assessment/Plan     Assessment:  64year old male with chronic EtOH use, esophageal varices, GI bleed, HCV, trauma transfer from [facility] s/p fall with EtOH on board, left zygomaticomaxillary complex fracture, minimally displaced, opening is normal with no vision changes reported  No OMFS intervention is indicated at this time  Plan:  Sinus precautions - no nose blowing, no bending down, no strenuous exercise or lifting heavy objects for the next two weeks, no excessive valsalva  Ice to face x24 hours   Dentures may be worn in about 2 weeks   Outpatient follow up   History of Present Illness     HPI:  Vidhi Penaloza is a 64 y o  male who presents with chronic EtOH abuse, esophageal varices, GI bleed, HCV, trauma transfer from [facility] with facial fractures s/p fall  Patient was apparently found yelling in parking lot  Was initially intubated for airway protection and GCS 13  Now extubated, sitting in chair, awaiting discharge  Denies changes to vision, wears dentures but the dentures are at home  Consults    Review of Systems   Constitutional: Positive for activity change  HENT: Positive for facial swelling  Negative for dental problem  Eyes: Negative for visual disturbance         Historical Information   Past Medical History:   Diagnosis Date    Arthritis     Esophageal varices ([address] )     Diagnosed in [facility] in 2018    GI bleed     2018    Hepatitis C     History of transfusion     Hyperlipidemia     Hypertension     Psychiatric disorder     bipolar    Renal disorder      Past Surgical History:   Procedure Laterality Date    COLON SURGERY      colonoscopy    FRACTURE SURGERY      left thumb     Social History   Social History     Substance and Sexual Activity   Alcohol Use Yes    Frequency: 4 or more times a week    Drinks per session: 5 or 6    Binge frequency: Daily or almost daily    Comment: ashleigh 24oz cans 6 a day     Social History     Substance and Sexual Activity   Drug Use No     Social History     Tobacco Use   Smoking Status Current Every Day Smoker    Packs/day: 0 50   Smokeless Tobacco Current User    Types: Chew     Family History: non-contributory    Meds/Allergies   all current active meds have been reviewed and current meds:   Current Facility-Administered Medications   Medication Dose Route Frequency    bisacodyl (DULCOLAX) rectal suppository 10 mg  10 mg Rectal Daily PRN    chlorhexidine (PERIDEX) 0 12 % oral rinse 15 mL  15 mL Swish & Spit Q12H [address]    enoxaparin (LOVENOX) subcutaneous injection 30 mg  30 mg Subcutaneous Q12H [address]    [START ON 52/44/8212] folic acid (FOLVITE) tablet 1 mg  1 mg Oral Daily    multivitamin-minerals (CENTRUM) tablet 1 tablet  1 tablet Oral Daily    ondansetron (ZOFRAN) injection 4 mg  4 mg Intravenous Q6H PRN    [START ON 11/28/2019] pantoprazole (PROTONIX) EC tablet 40 mg  40 mg Oral Early Morning    [START ON 11/28/2019] thiamine (VITAMIN B1) tablet 100 mg  100 mg Oral Daily     Allergies   Allergen Reactions    Hydrocodone-Acetaminophen Rash, Abdominal Pain and GI Intolerance       Objective   First Vitals:   Blood Pressure: 149/93 (11/26/19 2348)  Pulse: 74 (11/26/19 2348)  Temperature: 97 6 °F (36 4 °C) (11/26/19 2348)  Temp Source: Rectal (11/26/19 2348)  Respirations: 16 (11/26/19 2348)  Height: 6' (182 9 cm) (11/26/19 2348)  Weight - Scale: 87 4 kg (192 lb 10 9 oz) (11/26/19 2348)  SpO2: 100 % (11/26/19 2348)    Current Vitals:   Blood Pressure: 159/93 (11/27/19 1500)  Pulse: 76 (11/27/19 1500)  Temperature: 98 °F (36 7 °C) (11/27/19 1200)  Temp Source: Oral (11/27/19 1200)  Respirations: (!) 25 (11/27/19 1500)  Height: 6' (182 9 cm) (11/27/19 1504)  Weight - Scale: 87 6 kg (193 lb 2 oz) (11/27/19 1502)  SpO2: 96 % (11/27/19 1500)      Intake/Output Summary (Last 24 hours) at 11/27/2019 6761  Last data filed at 11/27/2019 1534  Gross per 24 hour   Intake 1749 37 ml   Output 2880 ml   Net -1130 63 ml       Invasive Devices     Peripheral Intravenous Line            Peripheral IV 11/26/19 Right Antecubital 1 day    Peripheral IV 11/26/19 Left Forearm less than 1 day    Peripheral IV 11/26/19 Right Forearm less than 1 day                Physical Exam   Constitutional: He is oriented to person, place, and time  No distress  HENT:   Head: Normocephalic  Head is without Mart's sign  Right Ear: External ear normal    Left Ear: External ear normal    Nose: Nose normal    Mouth/Throat: Uvula is midline and oropharynx is clear and moist    JESSICA WNL   No limitation in ROM  Edentulous    Eyes: Pupils are equal, round, and reactive to light  EOM are normal  Left conjunctiva has a hemorrhage  Neck: Normal range of motion  Cardiovascular: Intact distal pulses  Pulmonary/Chest: Effort normal    Abdominal: Soft  Genitourinary:   Genitourinary Comments: deferred   Neurological: He is alert and oriented to person, place, and time         Lab Results:   CBC:   Lab Results   Component Value Date    WBC 6 94 11/27/2019    HGB 11 9 (L) 11/27/2019    HCT 37 7 11/27/2019    MCV 90 11/27/2019     (L) 11/27/2019    MCH 28 3 11/27/2019    MCHC 31 6 11/27/2019    RDW 17 3 (H) 11/27/2019    MPV 10 3 11/27/2019    NRBC 0 11/27/2019   , CMP:   Lab Results   Component Value Date    SODIUM 141 11/27/2019    K 5 1 11/27/2019     (H) 11/27/2019    CO2 23 11/27/2019    BUN 8 11/27/2019    CREATININE 0 67 11/27/2019    CALCIUM 7 5 (L) 11/27/2019    AST 60 (H) 11/26/2019    ALT 33 11/26/2019    ALKPHOS 85 11/26/2019    EGFR 107 11/27/2019   , Coagulation:   Lab Results   Component Value Date    INR 1 16 11/26/2019   , Urinalysis: No results found for: Karthik Aston, SPECGRAV, PHUR, LEUKOCYTESUR, NITRITE, PROTEINUA, GLUCOSEU, KETONESU, BILIRUBINUR, BLOODU, Amylase: No results found for: AMYLASE, Lipase: No results found for: LIPASE  Imaging: I have personally reviewed pertinent films in PACS  EKG, Pathology, and Other Studies: I have personally reviewed pertinent reports  Counseling / Coordination of Care  Total floor / unit time spent today 35 minutes  Greater than 50% of total time was spent with the patient and / or family counseling and / or coordination of care

## 2019-11-27 NOTE — SOCIAL WORK
CM met pt at bedside, introduce self and made aware of CM role at dc  Pt has no LW and POA  Primary contact is his father Jayne [facility]- [phone]  Pt reported that he lives alone on a [address] apt with an elevator access and no YAMIL  Pt was IPTA with all ADL's, does not drive and on disability  Pt use public transportation for his appts  Pharmacy is [facility] in [facility]  PCP is Nataly Mejias  Pt has hx with [facility] but cannot remember the agency's name  Pt denies hx with STR, drug and IP psych tx  Pt reported that he had alc rehab hx at [facility]  Pt was dx with Bipolar and on medication and PCP manage  Pt has transportation when dc  Pt reported that he drinks 6 24 oz cans of beer/day  CM offered and explained to pt HOST program  Pt is agreeable for referral   CM called [phone] and spoke to Angelina Angel to refer pt  Pt's H and P, facesheet and medication list faxed [phone] as requested  CM will follow  CM reviewed d/c planning process including the following: identifying help at home, patient preference for d/c planning needs, Discharge Lounge, [facility] Meds to Bed program, availability of treatment team to discuss questions or concerns patient and/or family may have regarding understanding medications and recognizing signs and symptoms once discharged  CM also encouraged patient to follow up with all recommended appointments after discharge  Patient advised of importance for patient and family to participate in managing patients medical well being
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
## 2019-11-27 NOTE — RESPIRATORY THERAPY NOTE
RT Ventilator Management Note  Catalina Logan 64 y o  male MRN: [id number]  Unit/Bed#: ICU 02 Encounter: [id number]      Daily Screen       11/27/2019 0730 11/27/2019  1016          Patient safety screen outcome[de-identified]  Failed  Passed      Spont breathing trial outcome[de-identified]  --  Passed      Name of Medical Team Notified[de-identified]  --  Nurse      Preparing to extubate/ Notify Nurse:  --  Yes      Extubation order obtained:  --  Yes      Patient extubated:  --  Yes  (Pended)       RSBI:  --  23      Additional Mechanics Requested:  --  --              Physical Exam:   Assessment Type: Assess only  General Appearance: Sedated  Respiratory Pattern: Normal, Assisted  Chest Assessment: Chest expansion symmetrical  Bilateral Breath Sounds: Coarse  O2 Device: vent  Subjective Data: intubated      Resp Comments: recieved pt on AC/VC and tolerating well
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
## 2019-12-06 ENCOUNTER — TRANSITIONAL CARE MANAGEMENT (OUTPATIENT)
Dept: FAMILY MEDICINE CLINIC | Facility: HOME HEALTHCARE | Age: 59
End: 2019-12-06

## 2019-12-17 ENCOUNTER — TELEPHONE (OUTPATIENT)
Dept: OTHER | Facility: OTHER | Age: 59
End: 2019-12-17

## 2019-12-17 NOTE — TELEPHONE ENCOUNTER
Patient would like to schedule an appointment for afternoon  Also, he is stating he needs all his medications updated (especially gabapentin)

## 2020-01-07 ENCOUNTER — TELEPHONE (OUTPATIENT)
Dept: NEUROLOGY | Facility: CLINIC | Age: 60
End: 2020-01-07

## 2020-01-07 ENCOUNTER — OFFICE VISIT (OUTPATIENT)
Dept: FAMILY MEDICINE CLINIC | Facility: HOME HEALTHCARE | Age: 60
End: 2020-01-07
Payer: MEDICARE

## 2020-01-07 VITALS
HEIGHT: 72 IN | RESPIRATION RATE: 18 BRPM | DIASTOLIC BLOOD PRESSURE: 100 MMHG | HEART RATE: 86 BPM | BODY MASS INDEX: 25.79 KG/M2 | TEMPERATURE: 98.7 F | SYSTOLIC BLOOD PRESSURE: 172 MMHG | WEIGHT: 190.4 LBS | OXYGEN SATURATION: 96 %

## 2020-01-07 DIAGNOSIS — G47.09 OTHER INSOMNIA: ICD-10-CM

## 2020-01-07 DIAGNOSIS — G56.03 BILATERAL CARPAL TUNNEL SYNDROME: ICD-10-CM

## 2020-01-07 DIAGNOSIS — I10 ESSENTIAL HYPERTENSION WITH GOAL BLOOD PRESSURE LESS THAN 140/90: Chronic | ICD-10-CM

## 2020-01-07 DIAGNOSIS — Z71.6 ENCOUNTER FOR SMOKING CESSATION COUNSELING: ICD-10-CM

## 2020-01-07 DIAGNOSIS — Z76.89 ENCOUNTER FOR SUPPORT AND COORDINATION OF TRANSITION OF CARE: Primary | ICD-10-CM

## 2020-01-07 DIAGNOSIS — G89.29 OTHER CHRONIC PAIN: ICD-10-CM

## 2020-01-07 DIAGNOSIS — S02.82XA: ICD-10-CM

## 2020-01-07 DIAGNOSIS — I10 ESSENTIAL HYPERTENSION: ICD-10-CM

## 2020-01-07 PROCEDURE — 99213 OFFICE O/P EST LOW 20 MIN: CPT | Performed by: FAMILY MEDICINE

## 2020-01-07 RX ORDER — NICOTINE 21 MG/24HR
PATCH, TRANSDERMAL 24 HOURS TRANSDERMAL
COMMUNITY
Start: 2017-04-18 | End: 2020-01-07 | Stop reason: SDUPTHER

## 2020-01-07 RX ORDER — CARVEDILOL 3.12 MG/1
3.12 TABLET ORAL 2 TIMES DAILY WITH MEALS
Qty: 180 TABLET | Refills: 0 | Status: SHIPPED | OUTPATIENT
Start: 2020-01-07 | End: 2020-03-20 | Stop reason: SDUPTHER

## 2020-01-07 RX ORDER — LISINOPRIL AND HYDROCHLOROTHIAZIDE 20; 12.5 MG/1; MG/1
1 TABLET ORAL DAILY
Qty: 90 TABLET | Refills: 0 | Status: SHIPPED | OUTPATIENT
Start: 2020-01-07 | End: 2020-02-04 | Stop reason: SDUPTHER

## 2020-01-07 RX ORDER — GABAPENTIN 600 MG/1
600 TABLET ORAL
Qty: 30 TABLET | Refills: 0 | Status: SHIPPED | OUTPATIENT
Start: 2020-01-07 | End: 2020-02-03 | Stop reason: SDUPTHER

## 2020-01-07 RX ORDER — TRAZODONE HYDROCHLORIDE 100 MG/1
100 TABLET ORAL
Qty: 30 TABLET | Refills: 0 | Status: SHIPPED | OUTPATIENT
Start: 2020-01-07 | End: 2020-02-24 | Stop reason: SDUPTHER

## 2020-01-07 RX ORDER — TRAZODONE HYDROCHLORIDE 50 MG/1
50 TABLET ORAL
Qty: 30 TABLET | Refills: 0 | Status: SHIPPED | OUTPATIENT
Start: 2020-01-07 | End: 2020-02-24 | Stop reason: SDUPTHER

## 2020-01-07 RX ORDER — NICOTINE 21 MG/24HR
1 PATCH, TRANSDERMAL 24 HOURS TRANSDERMAL DAILY
Qty: 30 PATCH | Refills: 0 | Status: SHIPPED | OUTPATIENT
Start: 2020-01-07 | End: 2020-03-20

## 2020-01-07 NOTE — PATIENT INSTRUCTIONS
Smoking cessation advised  Reduction of personal exposure to occupation dusts, fumes, and gases  Reduction to indoor and outdoor air pollutants  Advised influenza vaccine  Pneumococcal vaccine advised  Check blood pressure regularly  Purchase home monitoring of blood pressure device if insurance does not cover  Eat a healthy diet, reviewed DASH diet  Do not add salt salt to food  Reduce saturated fats  Encourage physical activity, 30 minutes daily  Limit alcohol use  Smoking cessation if applicable  Stress reduction

## 2020-01-07 NOTE — PROGRESS NOTES
[address]       NAME: Henri Barba is a 64 y o  male  : 1960    MRN: [id number]  DATE: 2020  TIME: 2:27 PM    Assessment and Plan   Diagnoses and all orders for this visit:    Encounter for support and coordination of transition of care    Essential hypertension  -     lisinopril-hydrochlorothiazide (PRINZIDE,ZESTORETIC) 20-12 5 MG per tablet; Take 1 tablet by mouth daily  -     carvedilol (COREG) 3 125 mg tablet; Take 1 tablet (3 125 mg total) by mouth 2 (two) times a day with meals    Bilateral carpal tunnel syndrome  -     gabapentin (NEURONTIN) 600 MG tablet; Take 1 tablet (600 mg total) by mouth daily at bedtime    Other insomnia  -     traZODone (DESYREL) 50 mg tablet; Take 1 tablet (50 mg total) by mouth daily at bedtime  -     traZODone (DESYREL) 100 mg tablet; Take 1 tablet (100 mg total) by mouth daily at bedtime    Fracture of other specified skull and facial bones, left side, initial encounter for closed fracture [facility])  Comments: Follow-up with Neurology  Orders:  -     Ambulatory referral to Neurology; Future    Encounter for smoking cessation counseling  -     nicotine (NICODERM CQ) 21 mg/24 hr TD 24 hr patch; Place 1 patch on the skin daily    Other chronic pain  -     Ambulatory referral to Pain Management; Future    Essential hypertension with goal blood pressure less than 140/90  Comments:  Patient admits he has been taking Coreg once daily, will start taking as ordered and recheck in 2 weeks    Other orders  -     Discontinue: nicotine (NICODERM CQ) 21 mg/24 hr TD 24 hr patch; Place 1 patch on the skin daily  No problem-specific Assessment & Plan notes found for this encounter  Patient Instructions           Chief Complaint     Chief Complaint   Patient presents with    Transition of Care Management     Pt here for tcm, was admitted with skull and facial fractures   Pt also having problems with his medications     Nicotine Dependence     Pt would like nicoderm patches to quit smoking          History of Present Illness         James is here for routine follow-up, and transition of care  He does have history of EtOH use, esophageal varices, GI bleed, HCV, and hypertension  He was recently admitted to hospital for several left-sided facial fractures after a fall  Fall was related to ETOH  He was intubated for airway protection, and extubated next day on rounds  Had been recommended to follow up in 1 week, he did not do this  Reports he was not aware of the need to do this  Is complaining of frequent headaches/pain to left side of face where the fracture was  No facial drooping or slurred speech  At previous visit in October we did discuss MRI which was significant for findings of multiple nodules  He is due to have follow-up imaging done this month  He was referred to GI at previous visit  Blood work was also ordered at previous visit, he has yet to have this done  Did offer gentle reminder to please complete this before next visit in 2 weeks  He realizes agreement understanding of such  Continues to smoke, is interested in smoking cessation              Review of Systems   Review of Systems      Current Medications       Current Outpatient Medications:     carvedilol (COREG) 3 125 mg tablet, Take 1 tablet (3 125 mg total) by mouth 2 (two) times a day with meals, Disp: 180 tablet, Rfl: 0    gabapentin (NEURONTIN) 600 MG tablet, Take 1 tablet (600 mg total) by mouth daily at bedtime, Disp: 30 tablet, Rfl: 0    lisinopril-hydrochlorothiazide (PRINZIDE,ZESTORETIC) 20-12 5 MG per tablet, Take 1 tablet by mouth daily, Disp: 90 tablet, Rfl: 0    traZODone (DESYREL) 100 mg tablet, Take 1 tablet (100 mg total) by mouth daily at bedtime, Disp: 30 tablet, Rfl: 0    traZODone (DESYREL) 50 mg tablet, Take 1 tablet (50 mg total) by mouth daily at bedtime, Disp: 30 tablet, Rfl: 0    multivitamin (THERAGRAN) TABS, Take 1 tablet by mouth daily, Disp: , Rfl:    nicotine (NICODERM CQ) 21 mg/24 hr TD 24 hr patch, Place 1 patch on the skin daily, Disp: 30 patch, Rfl: 0    pantoprazole (PROTONIX) 40 mg tablet, Take 1 tablet (40 mg total) by mouth daily in the early morning (Patient not taking: Reported on 1/7/2020), Disp: 90 tablet, Rfl: 0    Current Allergies     Allergies as of 01/07/2020 - Reviewed 01/07/2020   Allergen Reaction Noted    Hydrocodone-acetaminophen Rash, Abdominal Pain, and GI Intolerance 12/22/2017            The following portions of the patient's history were reviewed and updated as appropriate: allergies, current medications, past family history, past medical history, past social history, past surgical history and problem list      Past Medical History:   Diagnosis Date    Arthritis     Esophageal varices ([address] )     Diagnosed in [place] in 2018    GI bleed     2018    Hepatitis C     History of transfusion     Hyperlipidemia     Hypertension     Psychiatric disorder     bipolar    Renal disorder        Past Surgical History:   Procedure Laterality Date    COLON SURGERY      colonoscopy    FRACTURE SURGERY      left thumb       Family History   Problem Relation Age of Onset    Cancer Mother          Medications have been verified          Objective   BP (!) 172/100   Pulse 86   Temp 98 7 °F (37 1 °C) (Tympanic)   Resp 18   Ht 6' (1 829 m)   Wt 86 4 kg (190 lb 6 4 oz)   SpO2 96%   BMI 25 82 kg/m²        Physical Exam     Physical Exam

## 2020-01-09 ENCOUNTER — TELEPHONE (OUTPATIENT)
Dept: NEUROLOGY | Facility: CLINIC | Age: 60
End: 2020-01-09
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
## 2020-02-03 DIAGNOSIS — G56.03 BILATERAL CARPAL TUNNEL SYNDROME: ICD-10-CM

## 2020-02-03 RX ORDER — GABAPENTIN 600 MG/1
600 TABLET ORAL
Qty: 30 TABLET | Refills: 0 | Status: SHIPPED | OUTPATIENT
Start: 2020-02-03 | End: 2020-02-26 | Stop reason: SDUPTHER

## 2020-02-04 DIAGNOSIS — I10 ESSENTIAL HYPERTENSION: ICD-10-CM

## 2020-02-04 RX ORDER — LISINOPRIL AND HYDROCHLOROTHIAZIDE 20; 12.5 MG/1; MG/1
1 TABLET ORAL DAILY
Qty: 90 TABLET | Refills: 0 | Status: SHIPPED | OUTPATIENT
Start: 2020-02-04 | End: 2020-03-20 | Stop reason: SDUPTHER

## 2020-02-06 ENCOUNTER — TELEPHONE (OUTPATIENT)
Dept: FAMILY MEDICINE CLINIC | Facility: HOME HEALTHCARE | Age: 60
End: 2020-02-06

## 2020-02-06 NOTE — TELEPHONE ENCOUNTER
Informed patient that his nicotine patches aren't covered under his insurance since they are an over-the-counter product

## 2020-02-24 DIAGNOSIS — G47.09 OTHER INSOMNIA: ICD-10-CM

## 2020-02-24 RX ORDER — TRAZODONE HYDROCHLORIDE 50 MG/1
50 TABLET ORAL
Qty: 30 TABLET | Refills: 0 | Status: SHIPPED | OUTPATIENT
Start: 2020-02-24 | End: 2020-03-20 | Stop reason: SDUPTHER

## 2020-02-24 RX ORDER — TRAZODONE HYDROCHLORIDE 100 MG/1
100 TABLET ORAL
Qty: 30 TABLET | Refills: 0 | Status: SHIPPED | OUTPATIENT
Start: 2020-02-24 | End: 2020-03-20 | Stop reason: SDUPTHER

## 2020-02-26 DIAGNOSIS — G56.03 BILATERAL CARPAL TUNNEL SYNDROME: ICD-10-CM

## 2020-02-26 RX ORDER — GABAPENTIN 600 MG/1
600 TABLET ORAL
Qty: 30 TABLET | Refills: 0 | Status: SHIPPED | OUTPATIENT
Start: 2020-02-26 | End: 2020-03-10 | Stop reason: SDUPTHER

## 2020-03-07 ENCOUNTER — APPOINTMENT (EMERGENCY)
Dept: RADIOLOGY | Facility: HOSPITAL | Age: 60
End: 2020-03-07
Payer: MEDICARE

## 2020-03-07 ENCOUNTER — HOSPITAL ENCOUNTER (EMERGENCY)
Facility: HOSPITAL | Age: 60
Discharge: ANOTHER HOSPITAL | End: 2020-03-07
Attending: EMERGENCY MEDICINE | Admitting: EMERGENCY MEDICINE
Payer: MEDICARE

## 2020-03-07 ENCOUNTER — APPOINTMENT (EMERGENCY)
Dept: CT IMAGING | Facility: HOSPITAL | Age: 60
End: 2020-03-07
Payer: MEDICARE

## 2020-03-07 VITALS
RESPIRATION RATE: 22 BRPM | DIASTOLIC BLOOD PRESSURE: 65 MMHG | SYSTOLIC BLOOD PRESSURE: 107 MMHG | TEMPERATURE: 97.7 F | OXYGEN SATURATION: 94 % | WEIGHT: 190 LBS | BODY MASS INDEX: 25.77 KG/M2 | HEART RATE: 68 BPM

## 2020-03-07 DIAGNOSIS — E86.1 HYPOVOLEMIA: Primary | ICD-10-CM

## 2020-03-07 DIAGNOSIS — E87.6 HYPOKALEMIA: ICD-10-CM

## 2020-03-07 DIAGNOSIS — N17.9 AKI (ACUTE KIDNEY INJURY) (HCC): ICD-10-CM

## 2020-03-07 LAB
ABO GROUP BLD: NORMAL
ALBUMIN SERPL BCP-MCNC: 3.8 G/DL (ref 3.5–5)
ALP SERPL-CCNC: 82 U/L (ref 46–116)
ALT SERPL W P-5'-P-CCNC: 25 U/L (ref 12–78)
ANION GAP SERPL CALCULATED.3IONS-SCNC: 8 MMOL/L (ref 4–13)
APAP SERPL-MCNC: <2 UG/ML (ref 10–20)
APTT PPP: 33 SECONDS (ref 23–37)
AST SERPL W P-5'-P-CCNC: 46 U/L (ref 5–45)
BACTERIA UR QL AUTO: ABNORMAL /HPF
BASOPHILS # BLD AUTO: 0.07 THOUSANDS/ΜL (ref 0–0.1)
BASOPHILS NFR BLD AUTO: 1 % (ref 0–1)
BILIRUB DIRECT SERPL-MCNC: 0.73 MG/DL (ref 0–0.2)
BILIRUB SERPL-MCNC: 1.8 MG/DL (ref 0.2–1)
BILIRUB UR QL STRIP: ABNORMAL
BLD GP AB SCN SERPL QL: NEGATIVE
BUN SERPL-MCNC: 23 MG/DL (ref 5–25)
CALCIUM SERPL-MCNC: 8.9 MG/DL (ref 8.3–10.1)
CHLORIDE SERPL-SCNC: 90 MMOL/L (ref 100–108)
CLARITY UR: ABNORMAL
CO2 SERPL-SCNC: 28 MMOL/L (ref 21–32)
COLOR UR: ABNORMAL
CREAT SERPL-MCNC: 2.47 MG/DL (ref 0.6–1.3)
EOSINOPHIL # BLD AUTO: 0.09 THOUSAND/ΜL (ref 0–0.61)
EOSINOPHIL NFR BLD AUTO: 1 % (ref 0–6)
ERYTHROCYTE [DISTWIDTH] IN BLOOD BY AUTOMATED COUNT: 16.4 % (ref 11.6–15.1)
ETHANOL SERPL-MCNC: 311 MG/DL (ref 0–3)
GFR SERPL CREATININE-BSD FRML MDRD: 28 ML/MIN/1.73SQ M
GLUCOSE SERPL-MCNC: 115 MG/DL (ref 65–140)
GLUCOSE UR STRIP-MCNC: NEGATIVE MG/DL
HCT VFR BLD AUTO: 46.9 % (ref 36.5–49.3)
HGB BLD-MCNC: 15.4 G/DL (ref 12–17)
HGB UR QL STRIP.AUTO: NEGATIVE
HYALINE CASTS #/AREA URNS LPF: ABNORMAL /LPF
IMM GRANULOCYTES # BLD AUTO: 0.03 THOUSAND/UL (ref 0–0.2)
IMM GRANULOCYTES NFR BLD AUTO: 0 % (ref 0–2)
INR PPP: 1.13 (ref 0.84–1.19)
KETONES UR STRIP-MCNC: ABNORMAL MG/DL
LACTATE SERPL-SCNC: 2.7 MMOL/L (ref 0.5–2)
LEUKOCYTE ESTERASE UR QL STRIP: NEGATIVE
LIPASE SERPL-CCNC: 512 U/L (ref 73–393)
LYMPHOCYTES # BLD AUTO: 1.77 THOUSANDS/ΜL (ref 0.6–4.47)
LYMPHOCYTES NFR BLD AUTO: 21 % (ref 14–44)
MAGNESIUM SERPL-MCNC: 1.8 MG/DL (ref 1.6–2.6)
MCH RBC QN AUTO: 29 PG (ref 26.8–34.3)
MCHC RBC AUTO-ENTMCNC: 32.8 G/DL (ref 31.4–37.4)
MCV RBC AUTO: 88 FL (ref 82–98)
MONOCYTES # BLD AUTO: 0.97 THOUSAND/ΜL (ref 0.17–1.22)
MONOCYTES NFR BLD AUTO: 12 % (ref 4–12)
MUCOUS THREADS UR QL AUTO: ABNORMAL
NEUTROPHILS # BLD AUTO: 5.54 THOUSANDS/ΜL (ref 1.85–7.62)
NEUTS SEG NFR BLD AUTO: 65 % (ref 43–75)
NITRITE UR QL STRIP: NEGATIVE
NON-SQ EPI CELLS URNS QL MICRO: ABNORMAL /HPF
NRBC BLD AUTO-RTO: 0 /100 WBCS
PH UR STRIP.AUTO: 5 [PH]
PLATELET # BLD AUTO: 134 THOUSANDS/UL (ref 149–390)
PMV BLD AUTO: 9.6 FL (ref 8.9–12.7)
POTASSIUM SERPL-SCNC: 2.8 MMOL/L (ref 3.5–5.3)
PROT SERPL-MCNC: 8.3 G/DL (ref 6.4–8.2)
PROT UR STRIP-MCNC: ABNORMAL MG/DL
PROTHROMBIN TIME: 14.5 SECONDS (ref 11.6–14.5)
RBC # BLD AUTO: 5.31 MILLION/UL (ref 3.88–5.62)
RBC #/AREA URNS AUTO: ABNORMAL /HPF
RH BLD: POSITIVE
SALICYLATES SERPL-MCNC: <3 MG/DL (ref 3–20)
SODIUM SERPL-SCNC: 126 MMOL/L (ref 136–145)
SP GR UR STRIP.AUTO: 1.02 (ref 1–1.03)
SPECIMEN EXPIRATION DATE: NORMAL
TROPONIN I SERPL-MCNC: <0.02 NG/ML
UROBILINOGEN UR QL STRIP.AUTO: 4 E.U./DL
WBC # BLD AUTO: 8.47 THOUSAND/UL (ref 4.31–10.16)
WBC #/AREA URNS AUTO: ABNORMAL /HPF

## 2020-03-07 PROCEDURE — 96365 THER/PROPH/DIAG IV INF INIT: CPT

## 2020-03-07 PROCEDURE — 80329 ANALGESICS NON-OPIOID 1 OR 2: CPT | Performed by: EMERGENCY MEDICINE

## 2020-03-07 PROCEDURE — 96375 TX/PRO/DX INJ NEW DRUG ADDON: CPT

## 2020-03-07 PROCEDURE — 83690 ASSAY OF LIPASE: CPT | Performed by: EMERGENCY MEDICINE

## 2020-03-07 PROCEDURE — 74176 CT ABD & PELVIS W/O CONTRAST: CPT

## 2020-03-07 PROCEDURE — 96367 TX/PROPH/DG ADDL SEQ IV INF: CPT

## 2020-03-07 PROCEDURE — 80320 DRUG SCREEN QUANTALCOHOLS: CPT | Performed by: EMERGENCY MEDICINE

## 2020-03-07 PROCEDURE — 86850 RBC ANTIBODY SCREEN: CPT | Performed by: EMERGENCY MEDICINE

## 2020-03-07 PROCEDURE — 83605 ASSAY OF LACTIC ACID: CPT | Performed by: EMERGENCY MEDICINE

## 2020-03-07 PROCEDURE — 84484 ASSAY OF TROPONIN QUANT: CPT | Performed by: EMERGENCY MEDICINE

## 2020-03-07 PROCEDURE — 96366 THER/PROPH/DIAG IV INF ADDON: CPT

## 2020-03-07 PROCEDURE — 93005 ELECTROCARDIOGRAM TRACING: CPT

## 2020-03-07 PROCEDURE — 36415 COLL VENOUS BLD VENIPUNCTURE: CPT | Performed by: EMERGENCY MEDICINE

## 2020-03-07 PROCEDURE — 86901 BLOOD TYPING SEROLOGIC RH(D): CPT | Performed by: EMERGENCY MEDICINE

## 2020-03-07 PROCEDURE — 86900 BLOOD TYPING SEROLOGIC ABO: CPT | Performed by: EMERGENCY MEDICINE

## 2020-03-07 PROCEDURE — 80048 BASIC METABOLIC PNL TOTAL CA: CPT | Performed by: EMERGENCY MEDICINE

## 2020-03-07 PROCEDURE — 81001 URINALYSIS AUTO W/SCOPE: CPT | Performed by: EMERGENCY MEDICINE

## 2020-03-07 PROCEDURE — 85025 COMPLETE CBC W/AUTO DIFF WBC: CPT | Performed by: EMERGENCY MEDICINE

## 2020-03-07 PROCEDURE — 96361 HYDRATE IV INFUSION ADD-ON: CPT

## 2020-03-07 PROCEDURE — 85610 PROTHROMBIN TIME: CPT | Performed by: EMERGENCY MEDICINE

## 2020-03-07 PROCEDURE — 83735 ASSAY OF MAGNESIUM: CPT | Performed by: EMERGENCY MEDICINE

## 2020-03-07 PROCEDURE — 99285 EMERGENCY DEPT VISIT HI MDM: CPT | Performed by: EMERGENCY MEDICINE

## 2020-03-07 PROCEDURE — 80076 HEPATIC FUNCTION PANEL: CPT | Performed by: EMERGENCY MEDICINE

## 2020-03-07 PROCEDURE — 85730 THROMBOPLASTIN TIME PARTIAL: CPT | Performed by: EMERGENCY MEDICINE

## 2020-03-07 PROCEDURE — 96368 THER/DIAG CONCURRENT INF: CPT

## 2020-03-07 PROCEDURE — 99285 EMERGENCY DEPT VISIT HI MDM: CPT

## 2020-03-07 PROCEDURE — 71046 X-RAY EXAM CHEST 2 VIEWS: CPT

## 2020-03-07 PROCEDURE — 51798 US URINE CAPACITY MEASURE: CPT

## 2020-03-07 PROCEDURE — C9113 INJ PANTOPRAZOLE SODIUM, VIA: HCPCS | Performed by: EMERGENCY MEDICINE

## 2020-03-07 RX ORDER — POTASSIUM CHLORIDE 14.9 MG/ML
20 INJECTION INTRAVENOUS ONCE
Status: COMPLETED | OUTPATIENT
Start: 2020-03-07 | End: 2020-03-07

## 2020-03-07 RX ORDER — MAGNESIUM SULFATE 1 G/100ML
1 INJECTION INTRAVENOUS ONCE
Status: DISCONTINUED | OUTPATIENT
Start: 2020-03-07 | End: 2020-03-07

## 2020-03-07 RX ORDER — ONDANSETRON 2 MG/ML
4 INJECTION INTRAMUSCULAR; INTRAVENOUS ONCE
Status: COMPLETED | OUTPATIENT
Start: 2020-03-07 | End: 2020-03-07

## 2020-03-07 RX ORDER — POTASSIUM CHLORIDE 14.9 MG/ML
20 INJECTION INTRAVENOUS ONCE
Status: DISCONTINUED | OUTPATIENT
Start: 2020-03-07 | End: 2020-03-07

## 2020-03-07 RX ORDER — LORAZEPAM 2 MG/ML
2 INJECTION INTRAMUSCULAR ONCE
Status: COMPLETED | OUTPATIENT
Start: 2020-03-07 | End: 2020-03-07

## 2020-03-07 RX ORDER — SODIUM CHLORIDE 9 MG/ML
125 INJECTION, SOLUTION INTRAVENOUS CONTINUOUS
Status: DISCONTINUED | OUTPATIENT
Start: 2020-03-07 | End: 2020-03-07 | Stop reason: HOSPADM

## 2020-03-07 RX ADMIN — SODIUM CHLORIDE 125 ML/HR: 0.9 INJECTION, SOLUTION INTRAVENOUS at 03:26

## 2020-03-07 RX ADMIN — Medication 1 MG: at 04:41

## 2020-03-07 RX ADMIN — LORAZEPAM 2 MG: 2 INJECTION INTRAMUSCULAR; INTRAVENOUS at 02:51

## 2020-03-07 RX ADMIN — SODIUM CHLORIDE 1000 ML: 0.9 INJECTION, SOLUTION INTRAVENOUS at 02:04

## 2020-03-07 RX ADMIN — Medication 8 MG/HR: at 04:29

## 2020-03-07 RX ADMIN — ONDANSETRON 4 MG: 2 INJECTION INTRAMUSCULAR; INTRAVENOUS at 02:49

## 2020-03-07 RX ADMIN — Medication 80 MG: at 04:08

## 2020-03-07 RX ADMIN — Medication 100 MG: at 04:05

## 2020-03-07 RX ADMIN — SODIUM CHLORIDE, SODIUM LACTATE, POTASSIUM CHLORIDE, AND CALCIUM CHLORIDE 1000 ML: .6; .31; .03; .02 INJECTION, SOLUTION INTRAVENOUS at 04:04

## 2020-03-07 RX ADMIN — POTASSIUM CHLORIDE 20 MEQ: 14.9 INJECTION, SOLUTION INTRAVENOUS at 03:27

## 2020-03-07 NOTE — ED NOTES
Verified with pharmacy that I am able to run potassium, protonix, and thiamine in three separate lines at the same time        Darryle Poster, IDANIA  03/07/20 [id number]

## 2020-03-07 NOTE — ED PROVIDER NOTES
History  Chief Complaint   Patient presents with    Abdominal Pain     pt stated urinating blood, and having bloody bm's off and on x several years  tonight pt statedhas been drinking etoh "heavily" x 3 days, also feels faint  Patient: Gladys Younger y o /male  YOB: 1960  MRN: [id number]  PCP: KAIT David  Date of evaluation: 3/7/2020    (N B   Voice-recognition software may have been used in the preparation of this document  Occasional wrong word or "sound-alike" substitutions may have occurred due to the inherent limitations of voice recognition software  Interpretation should be guided by context )    Patient reports been having copious amounts of bright red blood per rectum  He has had multiple problems past with GI bleeding  In the past 24  hours he thinks he had 4 episodes in which he passed "a lot" of blood  He admits that this has been going on for the past 3 days  For the 1st 2 of those days he states that he has been drinking only water  Today however he admits that he return to drinking a large amount of alcohol  He admits to drinking pink and red fluids  History provided by:  Patient, medical records and significant other  Abdominal Pain   Pain location:  LLQ and RLQ  Associated symptoms: no chest pain, no chills, no cough, no fever, no shortness of breath and no vomiting        Prior to Admission Medications   Prescriptions Last Dose Informant Patient Reported?  Taking?   carvedilol (COREG) 3 125 mg tablet   No No   Sig: Take 1 tablet (3 125 mg total) by mouth 2 (two) times a day with meals   gabapentin (NEURONTIN) 600 MG tablet   No No   Sig: Take 1 tablet (600 mg total) by mouth daily at bedtime   lisinopril-hydrochlorothiazide (PRINZIDE,ZESTORETIC) 20-12 5 MG per tablet   No No   Sig: Take 1 tablet by mouth daily   multivitamin (THERAGRAN) TABS   Yes No   Sig: Take 1 tablet by mouth daily   nicotine (NICODERM CQ) 21 mg/24 hr TD 24 hr patch   No No   Sig: Place 1 patch on the skin daily   pantoprazole (PROTONIX) 40 mg tablet   No No   Sig: Take 1 tablet (40 mg total) by mouth daily in the early morning   Patient not taking: Reported on 1/7/2020   traZODone (DESYREL) 100 mg tablet   No No   Sig: Take 1 tablet (100 mg total) by mouth daily at bedtime   traZODone (DESYREL) 50 mg tablet   No No   Sig: Take 1 tablet (50 mg total) by mouth daily at bedtime      Facility-Administered Medications: None       Past Medical History:   Diagnosis Date    Arthritis     Esophageal varices ([address] )     Diagnosed in [facility] in 2018    GI bleed     2018    Hepatitis C     History of transfusion     Hyperlipidemia     Hypertension     Psychiatric disorder     bipolar    Renal disorder        Past Surgical History:   Procedure Laterality Date    COLON SURGERY      colonoscopy    FRACTURE SURGERY      left thumb       Family History   Problem Relation Age of Onset    Cancer Mother      I have reviewed and agree with the history as documented  E-Cigarette/Vaping     E-Cigarette/Vaping Substances     Social History     Tobacco Use    Smoking status: Current Every Day Smoker     Packs/day: 0 50    Smokeless tobacco: Current User     Types: Chew   Substance Use Topics    Alcohol use: Yes     Frequency: 4 or more times a week     Drinks per session: 5 or 6     Binge frequency: Daily or almost daily     Comment: ashleigh 24oz cans 6 a day    Drug use: No       Review of Systems   Constitutional: Negative  Negative for chills and fever  HENT: Negative  Negative for trouble swallowing and voice change  Eyes: Negative for photophobia and visual disturbance  Respiratory: Negative  Negative for cough and shortness of breath  Cardiovascular: Negative  Negative for chest pain and palpitations  Gastrointestinal: Positive for abdominal pain and blood in stool  Negative for vomiting  Endocrine: Negative  Negative for polydipsia and polyuria     Genitourinary: Negative  Negative for difficulty urinating and urgency  Musculoskeletal: Negative  Negative for back pain and neck pain  Skin: Negative  Negative for rash and wound  Allergic/Immunologic: Negative for immunocompromised state  Neurological: Negative  Negative for speech difficulty and weakness  Hematological: Negative  Negative for adenopathy  Does not bruise/bleed easily  All other systems reviewed and are negative  Physical Exam  Physical Exam   Constitutional: He is oriented to person, place, and time  He appears well-developed and well-nourished  HENT:   Mouth/Throat: Oropharynx is clear and moist and mucous membranes are normal    Voice normal   Eyes: Pupils are equal, round, and reactive to light  EOM are normal    Cardiovascular: Normal rate and regular rhythm  Pulmonary/Chest: Effort normal    Abdominal: Soft  Bowel sounds are normal  There is tenderness (mimimal) in the right lower quadrant and left lower quadrant  There is no rigidity, no rebound and no guarding  Genitourinary: Rectal exam shows guaiac positive stool (trace positive dark brown stool)  Neurological: He is alert and oriented to person, place, and time  GCS eye subscore is 4  GCS verbal subscore is 5  GCS motor subscore is 6  Skin: Skin is warm and dry  Psychiatric: He has a normal mood and affect  His speech is normal and behavior is normal    Nursing note and vitals reviewed        Vital Signs  ED Triage Vitals [03/07/20 0152]   Temperature Pulse Respirations Blood Pressure SpO2   97 7 °F (36 5 °C) 82 18 92/67 96 %      Temp Source Heart Rate Source Patient Position - Orthostatic VS BP Location FiO2 (%)   Temporal Monitor Lying Right arm --      Pain Score       5           Vitals:    03/07/20 0330 03/07/20 0400 03/07/20 0430 03/07/20 0500   BP: (!) 82/51 (!) 81/53 90/57 107/65   Pulse:  74 67 68   Patient Position - Orthostatic VS:             Visual Acuity      ED Medications  Medications   sodium chloride 0 9 % bolus 1,000 mL (0 mL Intravenous Stopped 3/7/20 0317)   LORazepam (ATIVAN) injection 2 mg (2 mg Intravenous Given 3/7/20 0251)   ondansetron (ZOFRAN) injection 4 mg (4 mg Intravenous Given 3/7/20 0249)   pantoprazole (PROTONIX) 80 mg in sodium chloride 0 9 % 100 mL IVPB (0 mg Intravenous Stopped 3/7/20 0428)   thiamine (VITAMIN B1) 100 mg in sodium chloride 0 9 % 50 mL IVPB (0 mg Intravenous Stopped 7/6/73 6272)   folic acid 1 mg in sodium chloride 0 9 % 50 mL IVPB (0 mg Intravenous Stopped 3/7/20 0510)   potassium chloride 20 mEq IVPB (premix) (20 mEq Intravenous New Bag 3/7/20 0327)   lactated ringers bolus 1,000 mL (0 mL Intravenous Stopped 3/7/20 0441)       Diagnostic Studies  Results Reviewed     Procedure Component Value Units Date/Time    Lactic acid, plasma [[id number]]  (Abnormal) Collected:  03/07/20 0404    Lab Status:  Final result Specimen:  Blood from Arm, Left Updated:  03/07/20 0436     LACTIC ACID 2 7 mmol/L     Narrative:       Result may be elevated if tourniquet was used during collection  Ethanol [[id number]]  (Abnormal) Collected:  03/07/20 0203    Lab Status:  Final result Specimen:  Blood Updated:  03/07/20 0419     Ethanol Lvl 829 mg/dL     Salicylate level [[id number]]  (Abnormal) Collected:  03/07/20 0203    Lab Status:  Final result Specimen:  Blood Updated:  42/44/13 9087     Salicylate Lvl <3 mg/dL     Acetaminophen level-If concentration is detectable, please discuss with medical  on call   [[id number]]  (Abnormal) Collected:  03/07/20 0203    Lab Status:  Final result Specimen:  Blood Updated:  03/07/20 0416     Acetaminophen Level <2 0 ug/mL     Urine Microscopic [[id number]]  (Abnormal) Collected:  03/07/20 0255    Lab Status:  Final result Specimen:  Urine, Clean Catch Updated:  03/07/20 0309     RBC, UA None Seen /hpf      WBC, UA 2-4 /hpf      Epithelial Cells Occasional /hpf      Bacteria, UA Occasional /hpf      Hyaline Casts, UA 10-20 /lpf      MUCUS THREADS Moderate    UA w Reflex to Microscopic w Reflex to Culture [[id number]]  (Abnormal) Collected:  03/07/20 0255    Lab Status:  Final result Specimen:  Urine, Clean Catch Updated:  03/07/20 0301     Color, UA Sugey     Clarity, UA Slightly Cloudy     Specific Gravity, UA 1 025     pH, UA 5 0     Leukocytes, UA Negative     Nitrite, UA Negative     Protein, UA 30 (1+) mg/dl      Glucose, UA Negative mg/dl      Ketones, UA 15 (1+) mg/dl      Urobilinogen, UA 4 0 E U /dl      Bilirubin, UA Interference- unable to analyze     Blood, UA Negative    Basic metabolic panel [[id number]]  (Abnormal) Collected:  03/07/20 0203    Lab Status:  Final result Specimen:  Blood from Arm, Right Updated:  03/07/20 0227     Sodium 126 mmol/L      Potassium 2 8 mmol/L      Chloride 90 mmol/L      CO2 28 mmol/L      ANION GAP 8 mmol/L      BUN 23 mg/dL      Creatinine 2 47 mg/dL      Glucose 115 mg/dL      Calcium 8 9 mg/dL      eGFR 28 ml/min/1 73sq m     Narrative:       [place] guidelines for Chronic Kidney Disease (CKD):     Stage 1 with normal or high GFR (GFR > 90 mL/min/1 73 square meters)    Stage 2 Mild CKD (GFR = 60-89 mL/min/1 73 square meters)    Stage 3A Moderate CKD (GFR = 45-59 mL/min/1 73 square meters)    Stage 3B Moderate CKD (GFR = 30-44 mL/min/1 73 square meters)    Stage 4 Severe CKD (GFR = 15-29 mL/min/1 73 square meters)    Stage 5 End Stage CKD (GFR <15 mL/min/1 73 square meters)  Note: GFR calculation is accurate only with a steady state creatinine    Hepatic function panel [[id number]]  (Abnormal) Collected:  03/07/20 0203    Lab Status:  Final result Specimen:  Blood from Arm, Right Updated:  03/07/20 0227     Total Bilirubin 1 80 mg/dL      Bilirubin, Direct 0 73 mg/dL      Alkaline Phosphatase 82 U/L      AST 46 U/L      ALT 25 U/L      Total Protein 8 3 g/dL      Albumin 3 8 g/dL     Magnesium [[id number]]  (Normal) Collected:  03/07/20 0203    Lab Status:  Final result Specimen: Blood from Arm, Right Updated:  03/07/20 0227     Magnesium 1 8 mg/dL     Lipase [[id number]]  (Abnormal) Collected:  03/07/20 0203    Lab Status:  Final result Specimen:  Blood from Arm, Right Updated:  03/07/20 0227     Lipase 512 u/L     Troponin I [[id number]]  (Normal) Collected:  03/07/20 0203    Lab Status:  Final result Specimen:  Blood from Arm, Right Updated:  03/07/20 0227     Troponin I <0 02 ng/mL     Protime-INR [[id number]]  (Normal) Collected:  03/07/20 0203    Lab Status:  Final result Specimen:  Blood from Arm, Right Updated:  03/07/20 0220     Protime 14 5 seconds      INR 1 13    APTT [[id number]]  (Normal) Collected:  03/07/20 0203    Lab Status:  Final result Specimen:  Blood from Arm, Right Updated:  03/07/20 0220     PTT 33 seconds     CBC and differential [[id number]]  (Abnormal) Collected:  03/07/20 0203    Lab Status:  Final result Specimen:  Blood from Arm, Right Updated:  03/07/20 0212     WBC 8 47 Thousand/uL      RBC 5 31 Million/uL      Hemoglobin 15 4 g/dL      Hematocrit 46 9 %      MCV 88 fL      MCH 29 0 pg      MCHC 32 8 g/dL      RDW 16 4 %      MPV 9 6 fL      Platelets 507 Thousands/uL      nRBC 0 /100 WBCs      Neutrophils Relative 65 %      Immat GRANS % 0 %      Lymphocytes Relative 21 %      Monocytes Relative 12 %      Eosinophils Relative 1 %      Basophils Relative 1 %      Neutrophils Absolute 5 54 Thousands/µL      Immature Grans Absolute 0 03 Thousand/uL      Lymphocytes Absolute 1 77 Thousands/µL      Monocytes Absolute 0 97 Thousand/µL      Eosinophils Absolute 0 09 Thousand/µL      Basophils Absolute 0 07 Thousands/µL                  XR chest 2 views   Final Result by Yesy Raymond MD (03/07 0494)      No acute cardiopulmonary disease  Workstation performed: [id number]         CT abdomen pelvis wo contrast   Final Result by Marni Fierro MD (03/07 0947)      The present examination is limited by motion artifact        The liver demonstrates cirrhotic morphology  The report of an MRI dated August 28, 2019 described a 7 mm hypervascular lesion in the right hepatic lobe  This is not well demonstrated on the present noncontrast CT  Follow-up is recommended  Nonemergent MRI is recommended for further evaluation, if there are no contraindications  Please see discussion  Splenomegaly  Varices  The wall of the distal esophagus appears thickened  Clinical correlation and follow-up is recommended  Other findings as described above, please see discussion  The study was marked in [facility] for immediate notification  Workstation performed: [id number]                    Procedures  Procedures         ED Course  ED Course as of Mar 10 0705   Sat Mar 07, 2020   0240 Pt vomited clear mucus tinged with blood  0240 Sodium(!): 126   0241 Potassium(!): 2 8   0241 Chloride(!): 90   0241 BUN: 23   0241 BHARAT   Creatinine(!): 2 47   0309 WBC: 8 47   0309 Baseline 12 to 14   Hemoglobin: 15 4   0309 Platelet Count(!): 835   0310 INR: 1 13   0310 PTT: 33   0327 D/W pts girlfriend at pt's request - he and she both would be in favor of transfusion if  he were to require it  Risks and benefits discussed in detail       0521 IMPRESSION:     The present examination is limited by motion artifact      The liver demonstrates cirrhotic morphology  The report of an MRI dated August 28, 2019 described a 7 mm hypervascular lesion in the right hepatic lobe  This is not well demonstrated on the present noncontrast CT  Follow-up is recommended  Nonemergent MRI is recommended for further evaluation, if there are no contraindications  Please see discussion      Splenomegaly  Varices      The wall of the distal esophagus appears thickened  Clinical correlation and follow-up is recommended      Other findings as described above, please see discussion      The study was marked in EPIC for immediate notification     CT abdomen pelvis wo contrast   0522 Pt left department with a SBP of 107            HEART Risk Score      Most Recent Value   Heart Score Risk Calculator   History  0 Filed at: 03/07/2020 0823   ECG  0 Filed at: 03/07/2020 2873   Age  1 Filed at: 03/07/2020 7308   Risk Factors  1 Filed at: 03/07/2020 0823   Troponin  0 Filed at: 03/07/2020 9638   HEART Score  2 Filed at: 03/07/2020 2087                            MDM      Disposition  Final diagnoses:   Hypovolemia   BHARAT (acute kidney injury) ([facility] [address] )   Hypokalemia     Time reflects when diagnosis was documented in both MDM as applicable and the Disposition within this note     Time User Action Codes Description Comment    3/7/2020  8:27 AM Yuri GIORDANO Add [E86 1] Hypovolemia     3/7/2020  8:27 AM Juventino Dennison Add [N17 9] BHARAT (acute kidney injury) ([facility] [address] )     3/7/2020  8:31 AM Yuri GIORDANO Add [E87 6] Hypokalemia       ED Disposition     ED Disposition Condition Date/Time Comment    Transfer to Another Facility-In Network  E Mar 7, 2020  2:45 AM Henri Barba should be transferred out to facility with GI availability          MD Documentation      Most Recent Value   Patient Condition  The patient has been stabilized such that within reasonable medical probability, no material deterioration of the patient condition or the condition of the unborn child(aracelis) is likely to result from the transfer   Reason for Transfer  Level of Care needed not available at this facility   Benefits of Transfer  Specialized equipment and/or services available at the receiving facility (Include comment)________________________ [facility]]   Risks of Transfer  Potential for delay in receiving treatment, Increased discomfort during transfer, Possible worsening of condition or death during transfer, Potential deterioration of medical condition, Loss of IV   Accepting Physician  Dr Anni Brennan Name, [address]   [address] [facility]    (Name & Tel number)  [facility] Sending MD Dr Ana Hernandez    Provider Certification  General risk, such as traffic hazards, adverse weather conditions, rough terrain or turbulence, possible failure of equipment (including vehicle or aircraft), or consequences of actions of persons outside the control of the transport personnel, Unanticipated needs of medical equipment and personnel during transport, Risk of worsening condition      RN Documentation      [address] [facility], [address]   [address]   Bed Assignment  ICU Bed 247-A    (Name & Tel number)  PAC   Report Given to  Halina Lindquist RN      Follow-up Information    None         Discharge Medication List as of 3/7/2020  5:39 AM      CONTINUE these medications which have NOT CHANGED    Details   carvedilol (COREG) 3 125 mg tablet Take 1 tablet (3 125 mg total) by mouth 2 (two) times a day with meals, Starting Tue 1/7/2020, Normal      gabapentin (NEURONTIN) 600 MG tablet Take 1 tablet (600 mg total) by mouth daily at bedtime, Starting Wed 2/26/2020, Normal      lisinopril-hydrochlorothiazide (PRINZIDE,ZESTORETIC) 20-12 5 MG per tablet Take 1 tablet by mouth daily, Starting Tue 2/4/2020, Normal      multivitamin (THERAGRAN) TABS Take 1 tablet by mouth daily, Historical Med      nicotine (NICODERM CQ) 21 mg/24 hr TD 24 hr patch Place 1 patch on the skin daily, Starting Tue 1/7/2020, Until Thu 2/6/2020, Normal      pantoprazole (PROTONIX) 40 mg tablet Take 1 tablet (40 mg total) by mouth daily in the early morning, Starting Thu 7/18/2019, Normal      !! traZODone (DESYREL) 100 mg tablet Take 1 tablet (100 mg total) by mouth daily at bedtime, Starting Mon 2/24/2020, Normal      !! traZODone (DESYREL) 50 mg tablet Take 1 tablet (50 mg total) by mouth daily at bedtime, Starting Mon 2/24/2020, Normal       !! - Potential duplicate medications found  Please discuss with provider  No discharge procedures on file      PDMP Review     None          ED Provider  Electronically Signed by           Zeferino Rao MD  03/10/20 9353

## 2020-03-07 NOTE — EMTALA/ACUTE CARE TRANSFER
[address] [facility] EMERGENCY DEPARTMENT  [address] [facility] [address]  Dept: [phone]      EMTALA TRANSFER CONSENT    NAME Marlen Montoya                                         1960                              MRN [id number]    I have been informed of my rights regarding examination, treatment, and transfer   by Dr Romelia Vail MD    Benefits: Specialized equipment and/or services available at the receiving facility (Include comment)________________________([facility] Gastroenterology)    Risks: Potential for delay in receiving treatment, Increased discomfort during transfer, Possible worsening of condition or death during transfer, Potential deterioration of medical condition, Loss of IV      Consent for Transfer:  I acknowledge that my medical condition has been evaluated and explained to me by the emergency department physician or other qualified medical person and/or my attending physician, who has recommended that I be transferred to the service of  Accepting Physician: Dr Mian Stanley at [address] Name, [address] : [facility]  The above potential benefits of such transfer, the potential risks associated with such transfer, and the probable risks of not being transferred have been explained to me, and I fully understand them  The doctor has explained that, in my case, the benefits of transfer outweigh the risks  I agree to be transferred  I authorize the performance of emergency medical procedures and treatments upon me in both transit and upon arrival at the receiving facility  Additionally, I authorize the release of any and all medical records to the receiving facility and request they be transported with me, if possible  I understand that the safest mode of transportation during a medical emergency is an ambulance and that the Hospital advocates the use of this mode of transport   Risks of traveling to the receiving facility by car, including absence of medical control, life sustaining equipment, such as oxygen, and medical personnel has been explained to me and I fully understand them  (DICK CORRECT BOX BELOW)  [  ]  I consent to the stated transfer and to be transported by ambulance/helicopter  [  ]  I consent to the stated transfer, but refuse transportation by ambulance and accept full responsibility for my transportation by car  I understand the risks of non-ambulance transfers and I exonerate the Hospital and its staff from any deterioration in my condition that results from this refusal     X___________________________________________    DATE  20  TIME________  Signature of patient or legally responsible individual signing on patient behalf           RELATIONSHIP TO PATIENT_________________________          Provider Certification    NAME Leslie Ridley                                         1960                              MRN [id number]    A medical screening exam was performed on the above named patient  Based on the examination:    Condition Necessitating Transfer There were no encounter diagnoses      Patient Condition: The patient has been stabilized such that within reasonable medical probability, no material deterioration of the patient condition or the condition of the unborn child(aracelis) is likely to result from the transfer    Reason for Transfer: Level of Care needed not available at this facility    Transfer Requirements: [address]   · Space available and qualified personnel available for treatment as acknowledged by [facility]  · Agreed to accept transfer and to provide appropriate medical treatment as acknowledged by       Dr Leandra Meckel  · Appropriate medical records of the examination and treatment of the patient are provided at the time of transfer   [address] _______  · Transfer will be performed by qualified personnel from    and appropriate transfer equipment as required, including the use of necessary and appropriate life support measures  Provider Certification: I have examined the patient and explained the following risks and benefits of being transferred/refusing transfer to the patient/family:  General risk, such as traffic hazards, adverse weather conditions, rough terrain or turbulence, possible failure of equipment (including vehicle or aircraft), or consequences of actions of persons outside the control of the transport personnel, Unanticipated needs of medical equipment and personnel during transport, Risk of worsening condition      Based on these reasonable risks and benefits to the patient and/or the unborn child(aracelis), and based upon the information available at the time of the patients examination, I certify that the medical benefits reasonably to be expected from the provision of appropriate medical treatments at another medical facility outweigh the increasing risks, if any, to the individuals medical condition, and in the case of labor to the unborn child, from effecting the transfer      X____________________________________________ DATE 03/07/20        TIME_______      ORIGINAL - SEND TO MEDICAL RECORDS   COPY - SEND WITH PATIENT DURING TRANSFER

## 2020-03-07 NOTE — ED NOTES
Post-void bladder scan showing 0mL urine at this time  Completed per Dr Scot Pallas Clent Gravely, RN  03/07/20 7675

## 2020-03-07 NOTE — ED NOTES
Pt had large episode of emesis which was red tinged  Dr Vincent Martínez aware, medication orders placed        Rl Blum RN  03/07/20 5373

## 2020-03-07 NOTE — ED NOTES
Nursing supervisor made aware of thiamine and folic acid order  To be mixed        Gia Collins, IDANIA  03/07/20 5161

## 2020-03-07 NOTE — ED PROCEDURE NOTE
PROCEDURE  ECG 12 Lead Documentation Only  Date/Time: 3/7/2020 2:07 AM  Performed by: Olimpia Zaragoza MD  Authorized by: Olimpia Zaragoza MD     Indications / Diagnosis:  Abd pain  ECG reviewed by me, the ED Provider: yes (Interpreted by me)    Patient location:  ED  Previous ECG:     Comparison to cardiac monitor: Yes    Interpretation:     Interpretation: abnormal    Rate:     ECG rate:  74    ECG rate assessment: normal    Rhythm:     Rhythm: sinus rhythm    Ectopy:     Ectopy: none    QRS:     QRS axis:  Right    QRS intervals:  Normal  Conduction:     Conduction: normal    ST segments:     ST segments:  Normal  T waves:     T waves: normal    Other findings:     Other findings: prolonged qTc interval    Comments:       FQTc 508 ms         Olimpia Zaragoza MD  03/07/20 [id number]

## 2020-03-08 LAB
ATRIAL RATE: 74 BPM
P AXIS: 39 DEGREES
PR INTERVAL: 170 MS
QRS AXIS: 34 DEGREES
QRSD INTERVAL: 106 MS
QT INTERVAL: 458 MS
QTC INTERVAL: 508 MS
T WAVE AXIS: 58 DEGREES
VENTRICULAR RATE: 74 BPM

## 2020-03-08 PROCEDURE — 93010 ELECTROCARDIOGRAM REPORT: CPT | Performed by: INTERNAL MEDICINE

## 2020-03-09 ENCOUNTER — TELEPHONE (OUTPATIENT)
Dept: FAMILY MEDICINE CLINIC | Facility: HOME HEALTHCARE | Age: 60
End: 2020-03-09

## 2020-03-09 NOTE — TELEPHONE ENCOUNTER
Patient called stating her gabapentin 600 mg was called in but only for 30 capsules and he states he normally gets 60 capsules  He wants to know if he can get a script for 60 capsules instead

## 2020-03-10 DIAGNOSIS — G56.03 BILATERAL CARPAL TUNNEL SYNDROME: ICD-10-CM

## 2020-03-10 RX ORDER — GABAPENTIN 600 MG/1
600 TABLET ORAL
Qty: 30 TABLET | Refills: 1 | Status: SHIPPED | OUTPATIENT
Start: 2020-03-10 | End: 2020-03-20 | Stop reason: SDUPTHER

## 2020-03-13 ENCOUNTER — TELEPHONE (OUTPATIENT)
Dept: FAMILY MEDICINE CLINIC | Facility: CLINIC | Age: 60
End: 2020-03-13

## 2020-03-13 NOTE — TELEPHONE ENCOUNTER
----- Message from Zabrina Mckeon MA sent at 3/13/2020  7:34 AM EDT -----  03/13/20 7:34 AM    Hello, our patient Leslie Ridley has had HIV completed/performed  Please assist in updating the patient chart by pulling the Care Everywhere (CE) document  The date of service is 05/17/2016       Thank you,  Zabrina Mckeon MA  [address]

## 2020-03-13 NOTE — TELEPHONE ENCOUNTER
Upon review of the In Basket request we were able to locate, review, and update the patient chart as requested for HIV  Any additional questions or concerns should be emailed to the Practice Liaisons via [email or web address] email, please do not reply via In Basket      Thank you  Mir Altamirano MA

## 2020-03-20 ENCOUNTER — OFFICE VISIT (OUTPATIENT)
Dept: FAMILY MEDICINE CLINIC | Facility: HOME HEALTHCARE | Age: 60
End: 2020-03-20
Payer: MEDICARE

## 2020-03-20 VITALS
TEMPERATURE: 97.3 F | OXYGEN SATURATION: 96 % | DIASTOLIC BLOOD PRESSURE: 108 MMHG | WEIGHT: 193 LBS | HEART RATE: 78 BPM | RESPIRATION RATE: 18 BRPM | HEIGHT: 72 IN | BODY MASS INDEX: 26.14 KG/M2 | SYSTOLIC BLOOD PRESSURE: 150 MMHG

## 2020-03-20 DIAGNOSIS — G56.03 BILATERAL CARPAL TUNNEL SYNDROME: ICD-10-CM

## 2020-03-20 DIAGNOSIS — F10.20 ALCOHOLISM (HCC): ICD-10-CM

## 2020-03-20 DIAGNOSIS — F31.9 BIPOLAR 1 DISORDER (HCC): ICD-10-CM

## 2020-03-20 DIAGNOSIS — F10.929 ALCOHOL USE, UNSPECIFIED WITH INTOXICATION, UNSPECIFIED (HCC): ICD-10-CM

## 2020-03-20 DIAGNOSIS — K31.89 PORTAL HYPERTENSIVE GASTROPATHY (HCC): ICD-10-CM

## 2020-03-20 DIAGNOSIS — K70.30 ALCOHOLIC CIRRHOSIS OF LIVER WITHOUT ASCITES (HCC): ICD-10-CM

## 2020-03-20 DIAGNOSIS — R56.9 SEIZURE (HCC): ICD-10-CM

## 2020-03-20 DIAGNOSIS — G47.09 OTHER INSOMNIA: ICD-10-CM

## 2020-03-20 DIAGNOSIS — D69.6 THROMBOCYTOPENIA, UNSPECIFIED (HCC): ICD-10-CM

## 2020-03-20 DIAGNOSIS — Z23 NEED FOR PNEUMOCOCCAL VACCINATION: ICD-10-CM

## 2020-03-20 DIAGNOSIS — Z23 NEED FOR TDAP VACCINATION: Primary | ICD-10-CM

## 2020-03-20 DIAGNOSIS — K76.6 PORTAL HYPERTENSIVE GASTROPATHY (HCC): ICD-10-CM

## 2020-03-20 DIAGNOSIS — K76.89 LIVER NODULE: ICD-10-CM

## 2020-03-20 DIAGNOSIS — I10 ESSENTIAL HYPERTENSION: ICD-10-CM

## 2020-03-20 PROCEDURE — 99213 OFFICE O/P EST LOW 20 MIN: CPT | Performed by: FAMILY MEDICINE

## 2020-03-20 RX ORDER — CARVEDILOL 3.12 MG/1
3.12 TABLET ORAL 2 TIMES DAILY WITH MEALS
Qty: 180 TABLET | Refills: 0 | Status: SHIPPED | OUTPATIENT
Start: 2020-03-20 | End: 2020-05-26 | Stop reason: SDUPTHER

## 2020-03-20 RX ORDER — GABAPENTIN 600 MG/1
600 TABLET ORAL
Qty: 30 TABLET | Refills: 1 | Status: SHIPPED | OUTPATIENT
Start: 2020-03-20 | End: 2020-05-08 | Stop reason: SDUPTHER

## 2020-03-20 RX ORDER — TRAZODONE HYDROCHLORIDE 100 MG/1
100 TABLET ORAL
Qty: 90 TABLET | Refills: 0 | Status: SHIPPED | OUTPATIENT
Start: 2020-03-20 | End: 2020-06-25 | Stop reason: SDUPTHER

## 2020-03-20 RX ORDER — TRAZODONE HYDROCHLORIDE 50 MG/1
50 TABLET ORAL
Qty: 90 TABLET | Refills: 0 | Status: SHIPPED | OUTPATIENT
Start: 2020-03-20 | End: 2020-06-26 | Stop reason: SDUPTHER

## 2020-03-20 RX ORDER — SPIRONOLACTONE 100 MG/1
TABLET, FILM COATED ORAL
COMMUNITY
Start: 2020-03-12 | End: 2020-03-20 | Stop reason: ALTCHOICE

## 2020-03-20 RX ORDER — LISINOPRIL AND HYDROCHLOROTHIAZIDE 20; 12.5 MG/1; MG/1
1 TABLET ORAL DAILY
Qty: 90 TABLET | Refills: 0 | Status: SHIPPED | OUTPATIENT
Start: 2020-03-20 | End: 2020-08-25

## 2020-03-20 NOTE — PATIENT INSTRUCTIONS
Smoking cessation advised  Reduction of personal exposure to occupation dusts, fumes, and gases  Reduction to indoor and outdoor air pollutants  Advised influenza vaccine  Pneumococcal vaccine advised    Avoid alcohol  Schedule with GI  Thoroughly reviewed emergent symptoms requiring immediate follow-up

## 2020-03-20 NOTE — PROGRESS NOTES
[address]       NAME: Madie Sorensen is a 64 y o  male  : 1960    MRN: [id number]  DATE: 2020  TIME: 12:09 PM    Assessment and Plan   Diagnoses and all orders for this visit:    Need for Tdap vaccination    Need for pneumococcal vaccination  -     Cancel: Pneumococcal polysaccharide vaccine 23-valent greater than or equal to 3yo subcutaneous/IM    Essential hypertension  -     carvedilol (COREG) 3 125 mg tablet; Take 1 tablet (3 125 mg total) by mouth 2 (two) times a day with meals  -     lisinopril-hydrochlorothiazide (PRINZIDE,ZESTORETIC) 20-12 5 MG per tablet; Take 1 tablet by mouth daily    Bilateral carpal tunnel syndrome  -     gabapentin (NEURONTIN) 600 MG tablet; Take 1 tablet (600 mg total) by mouth daily at bedtime    Other insomnia  -     traZODone (DESYREL) 100 mg tablet; Take 1 tablet (100 mg total) by mouth daily at bedtime  -     traZODone (DESYREL) 50 mg tablet; Take 1 tablet (50 mg total) by mouth daily at bedtime    Portal hypertensive gastropathy (HCC)    Alcohol use, unspecified with intoxication, unspecified ([facility] )  -     Ambulatory referral to Psychiatry; Future    Bipolar 1 disorder ([facility] )  -     Ambulatory referral to Psychiatry; Future    Seizure ([facility] )    Thrombocytopenia, unspecified ([facility] )    Liver nodule  -     MRI abdomen w wo contrast; Future  -     Ambulatory referral to Gastroenterology; Future    Alcoholism ([facility] )  -     Ambulatory referral to Gastroenterology; Future    Alcoholic cirrhosis of liver without ascites ([facility] )  -     Ambulatory referral to Gastroenterology; Future    Other orders  -     Cancel: Tdap vaccine greater than or equal to 6yo IM  -     Discontinue: spironolactone (ALDACTONE) 100 mg tablet        No problem-specific Assessment & Plan notes found for this encounter        Patient Instructions           Chief Complaint     Chief Complaint   Patient presents with    Follow-up     follow up after ED admission - wants to review ED labs and testing with you  Went to ED for syncope, siezures, coughing blood and bloody stools  History of Present Illness         Catalino Mathews is here for routine follow-up, and transition of care  He does have history of EtOH use, esophageal varices, GI bleed, HCV, and hypertension  He was recently admitted to hospital for bloody stool, and intubated for airway protection   At previous visit did order MRI of the abdomen, he has yet to follow through with this  During his hospitalization it was noted that he had a 3 6 cm lesion in the pancreatic tail seen on MRI  It was recommended that he follow-up with GI  Admits that he has not yet done this  Unfortunately he does continue to drink but reports that he has cut down  Denies any further episodes of bloody stools  No abdominal pain  No nausea vomiting or hematemesis  No abdominal pain  Current every day smoker, declines smoking cessation  Did spent extensive amount of time counseling patient regarding the possible sequela of alcoholism, and poor compliance with treatment, including end-stage liver failure  He does verbalize understanding is interested in establishing with Psychiatry, and even out patient treatment for alcoholism  Did encourage him to attend AA meetings, with the goal of complete alcohol abstinence  He does verbalize understanding of this, and reports that he will call GI upon leaving office to schedule follow-up  Review of Systems   Review of Systems   Constitutional: Negative for chills, fatigue, fever and unexpected weight change  HENT: Negative for postnasal drip, sinus pressure and sore throat  Eyes: Negative for discharge  Respiratory: Negative for chest tightness, shortness of breath and wheezing  Cardiovascular: Negative for chest pain  Gastrointestinal: Positive for abdominal distention  Negative for abdominal pain, constipation, diarrhea and vomiting  Musculoskeletal: Negative for arthralgias     Skin: Negative for rash  Neurological: Negative for dizziness and syncope  Current Medications       Current Outpatient Medications:     carvedilol (COREG) 3 125 mg tablet, Take 1 tablet (3 125 mg total) by mouth 2 (two) times a day with meals, Disp: 180 tablet, Rfl: 0    gabapentin (NEURONTIN) 600 MG tablet, Take 1 tablet (600 mg total) by mouth daily at bedtime, Disp: 30 tablet, Rfl: 1    lisinopril-hydrochlorothiazide (PRINZIDE,ZESTORETIC) 20-12 5 MG per tablet, Take 1 tablet by mouth daily, Disp: 90 tablet, Rfl: 0    traZODone (DESYREL) 100 mg tablet, Take 1 tablet (100 mg total) by mouth daily at bedtime, Disp: 90 tablet, Rfl: 0    traZODone (DESYREL) 50 mg tablet, Take 1 tablet (50 mg total) by mouth daily at bedtime, Disp: 90 tablet, Rfl: 0    multivitamin (THERAGRAN) TABS, Take 1 tablet by mouth daily, Disp: , Rfl:     Current Allergies     Allergies as of 03/20/2020 - Reviewed 03/20/2020   Allergen Reaction Noted    Acetaminophen  12/22/2017    Hydrocodone  12/22/2017    Hydrocodone-acetaminophen Rash, Abdominal Pain, and GI Intolerance 12/22/2017            The following portions of the patient's history were reviewed and updated as appropriate: allergies, current medications, past family history, past medical history, past social history, past surgical history and problem list      Past Medical History:   Diagnosis Date    Arthritis     Esophageal varices ([address] )     Diagnosed in [place] in 2018    GI bleed     2018    Hepatitis C     History of transfusion     Hyperlipidemia     Hypertension     Psychiatric disorder     bipolar    Renal disorder        Past Surgical History:   Procedure Laterality Date    COLON SURGERY      colonoscopy    FRACTURE SURGERY      left thumb       Family History   Problem Relation Age of Onset    Cancer Mother          Medications have been verified          Objective   BP (!) 150/108 (BP Location: Right arm, Patient Position: Sitting, Cuff Size: Adult) Pulse 78   Temp (!) 97 3 °F (36 3 °C) (Tympanic)   Resp 18   Ht 6' (1 829 m)   Wt 87 5 kg (193 lb)   SpO2 96%   BMI 26 18 kg/m²        Physical Exam     Physical Exam   Constitutional: He is oriented to person, place, and time  He appears well-developed and well-nourished  No distress  HENT:   Right Ear: External ear normal    Left Ear: External ear normal    Nose: Nose normal    Mouth/Throat: Oropharynx is clear and moist    Eyes: Pupils are equal, round, and reactive to light  EOM are normal    Neck: Normal range of motion  Neck supple  Cardiovascular: Normal rate, regular rhythm, normal heart sounds and intact distal pulses  Pulmonary/Chest: Effort normal and breath sounds normal    Abdominal: Soft  Musculoskeletal: Normal range of motion  Lymphadenopathy:     He has no cervical adenopathy  Neurological: He is alert and oriented to person, place, and time  Skin: Skin is warm and dry  Capillary refill takes less than 2 seconds  Psychiatric: He has a normal mood and affect  Nursing note and vitals reviewed

## 2020-04-06 ENCOUNTER — TELEMEDICINE (OUTPATIENT)
Dept: FAMILY MEDICINE CLINIC | Facility: HOME HEALTHCARE | Age: 60
End: 2020-04-06
Payer: MEDICARE

## 2020-04-06 DIAGNOSIS — F40.240 CLAUSTROPHOBIA: Primary | ICD-10-CM

## 2020-04-06 DIAGNOSIS — K86.89 PANCREATIC MASS: ICD-10-CM

## 2020-04-06 DIAGNOSIS — K70.30 ALCOHOLIC CIRRHOSIS OF LIVER WITHOUT ASCITES (HCC): ICD-10-CM

## 2020-04-06 PROCEDURE — G0071 COMM SVCS BY RHC/FQHC 5 MIN: HCPCS | Performed by: FAMILY MEDICINE

## 2020-04-06 RX ORDER — LORAZEPAM 0.5 MG/1
0.5 TABLET ORAL ONCE
Qty: 1 TABLET | Refills: 0 | Status: SHIPPED | OUTPATIENT
Start: 2020-04-06 | End: 2020-10-25 | Stop reason: HOSPADM

## 2020-04-06 RX ORDER — SPIRONOLACTONE 100 MG/1
TABLET, FILM COATED ORAL
COMMUNITY
Start: 2020-03-25 | End: 2020-10-25 | Stop reason: HOSPADM

## 2020-05-08 DIAGNOSIS — G56.03 BILATERAL CARPAL TUNNEL SYNDROME: ICD-10-CM

## 2020-05-08 RX ORDER — GABAPENTIN 600 MG/1
600 TABLET ORAL
Qty: 30 TABLET | Refills: 1 | Status: SHIPPED | OUTPATIENT
Start: 2020-05-08 | End: 2020-06-22 | Stop reason: SDUPTHER

## 2020-05-14 ENCOUNTER — TELEMEDICINE (OUTPATIENT)
Dept: FAMILY MEDICINE CLINIC | Facility: HOME HEALTHCARE | Age: 60
End: 2020-05-14
Payer: MEDICARE

## 2020-05-14 DIAGNOSIS — R55 SYNCOPE, UNSPECIFIED SYNCOPE TYPE: Primary | ICD-10-CM

## 2020-05-14 PROCEDURE — 99213 OFFICE O/P EST LOW 20 MIN: CPT | Performed by: NURSE PRACTITIONER

## 2020-05-14 PROCEDURE — G2025 DIS SITE TELE SVCS RHC/FQHC: HCPCS | Performed by: NURSE PRACTITIONER

## 2020-05-26 ENCOUNTER — TELEPHONE (OUTPATIENT)
Dept: GASTROENTEROLOGY | Facility: AMBULARY SURGERY CENTER | Age: 60
End: 2020-05-26

## 2020-05-26 DIAGNOSIS — I10 ESSENTIAL HYPERTENSION: ICD-10-CM

## 2020-05-26 RX ORDER — CARVEDILOL 3.12 MG/1
3.12 TABLET ORAL 2 TIMES DAILY WITH MEALS
Qty: 180 TABLET | Refills: 0 | Status: SHIPPED | OUTPATIENT
Start: 2020-05-26 | End: 2020-08-25

## 2020-06-09 ENCOUNTER — APPOINTMENT (OUTPATIENT)
Dept: LAB | Facility: HOSPITAL | Age: 60
End: 2020-06-09
Payer: MEDICARE

## 2020-06-09 ENCOUNTER — HOSPITAL ENCOUNTER (OUTPATIENT)
Dept: MRI IMAGING | Facility: HOSPITAL | Age: 60
Discharge: HOME | End: 2020-06-09
Payer: MEDICARE

## 2020-06-09 DIAGNOSIS — Z13.228 SCREENING FOR METABOLIC DISORDER: ICD-10-CM

## 2020-06-09 DIAGNOSIS — K76.89 LIVER NODULE: ICD-10-CM

## 2020-06-09 LAB
ALBUMIN SERPL BCP-MCNC: 3.4 G/DL (ref 3.5–5)
ALP SERPL-CCNC: 60 U/L (ref 46–116)
ALT SERPL W P-5'-P-CCNC: 25 U/L (ref 12–78)
ANION GAP SERPL CALCULATED.3IONS-SCNC: 7 MMOL/L (ref 4–13)
AST SERPL W P-5'-P-CCNC: 34 U/L (ref 5–45)
BASOPHILS # BLD AUTO: 0.02 THOUSANDS/ΜL (ref 0–0.1)
BASOPHILS NFR BLD AUTO: 1 % (ref 0–1)
BILIRUB DIRECT SERPL-MCNC: 0.34 MG/DL (ref 0–0.2)
BILIRUB SERPL-MCNC: 1 MG/DL (ref 0.2–1)
BUN SERPL-MCNC: 11 MG/DL (ref 5–25)
CALCIUM SERPL-MCNC: 8.7 MG/DL (ref 8.3–10.1)
CHLORIDE SERPL-SCNC: 105 MMOL/L (ref 100–108)
CHOLEST SERPL-MCNC: 159 MG/DL (ref 50–200)
CO2 SERPL-SCNC: 28 MMOL/L (ref 21–32)
CREAT SERPL-MCNC: 0.94 MG/DL (ref 0.6–1.3)
EOSINOPHIL # BLD AUTO: 0.04 THOUSAND/ΜL (ref 0–0.61)
EOSINOPHIL NFR BLD AUTO: 1 % (ref 0–6)
ERYTHROCYTE [DISTWIDTH] IN BLOOD BY AUTOMATED COUNT: 16.1 % (ref 11.6–15.1)
EST. AVERAGE GLUCOSE BLD GHB EST-MCNC: 103 MG/DL
GFR SERPL CREATININE-BSD FRML MDRD: 89 ML/MIN/1.73SQ M
GLUCOSE SERPL-MCNC: 127 MG/DL (ref 65–140)
HBA1C MFR BLD: 5.2 %
HCT VFR BLD AUTO: 41.6 % (ref 36.5–49.3)
HDLC SERPL-MCNC: 58 MG/DL
HGB BLD-MCNC: 13.1 G/DL (ref 12–17)
IMM GRANULOCYTES # BLD AUTO: 0.02 THOUSAND/UL (ref 0–0.2)
IMM GRANULOCYTES NFR BLD AUTO: 1 % (ref 0–2)
LDLC SERPL CALC-MCNC: 84 MG/DL (ref 0–100)
LYMPHOCYTES # BLD AUTO: 0.58 THOUSANDS/ΜL (ref 0.6–4.47)
LYMPHOCYTES NFR BLD AUTO: 15 % (ref 14–44)
MCH RBC QN AUTO: 29.2 PG (ref 26.8–34.3)
MCHC RBC AUTO-ENTMCNC: 31.5 G/DL (ref 31.4–37.4)
MCV RBC AUTO: 93 FL (ref 82–98)
MONOCYTES # BLD AUTO: 0.36 THOUSAND/ΜL (ref 0.17–1.22)
MONOCYTES NFR BLD AUTO: 9 % (ref 4–12)
NEUTROPHILS # BLD AUTO: 2.89 THOUSANDS/ΜL (ref 1.85–7.62)
NEUTS SEG NFR BLD AUTO: 73 % (ref 43–75)
NRBC BLD AUTO-RTO: 0 /100 WBCS
PLATELET # BLD AUTO: 73 THOUSANDS/UL (ref 149–390)
PMV BLD AUTO: 9.8 FL (ref 8.9–12.7)
POTASSIUM SERPL-SCNC: 4 MMOL/L (ref 3.5–5.3)
PROT SERPL-MCNC: 7.4 G/DL (ref 6.4–8.2)
RBC # BLD AUTO: 4.49 MILLION/UL (ref 3.88–5.62)
SODIUM SERPL-SCNC: 140 MMOL/L (ref 136–145)
TRIGL SERPL-MCNC: 87 MG/DL
TSH SERPL DL<=0.05 MIU/L-ACNC: 1.11 UIU/ML (ref 0.36–3.74)
WBC # BLD AUTO: 3.91 THOUSAND/UL (ref 4.31–10.16)

## 2020-06-09 PROCEDURE — 74183 MRI ABD W/O CNTR FLWD CNTR: CPT

## 2020-06-09 PROCEDURE — 80061 LIPID PANEL: CPT

## 2020-06-09 PROCEDURE — 84443 ASSAY THYROID STIM HORMONE: CPT

## 2020-06-09 PROCEDURE — 80053 COMPREHEN METABOLIC PANEL: CPT

## 2020-06-09 PROCEDURE — A9585 GADOBUTROL INJECTION: HCPCS | Performed by: RADIOLOGY

## 2020-06-09 PROCEDURE — 85025 COMPLETE CBC W/AUTO DIFF WBC: CPT

## 2020-06-09 PROCEDURE — 36415 COLL VENOUS BLD VENIPUNCTURE: CPT

## 2020-06-09 PROCEDURE — 82248 BILIRUBIN DIRECT: CPT

## 2020-06-09 PROCEDURE — 83036 HEMOGLOBIN GLYCOSYLATED A1C: CPT

## 2020-06-09 RX ADMIN — GADOBUTROL 8 ML: 604.72 INJECTION INTRAVENOUS at 12:47

## 2020-06-22 ENCOUNTER — OFFICE VISIT (OUTPATIENT)
Dept: FAMILY MEDICINE CLINIC | Facility: HOME HEALTHCARE | Age: 60
End: 2020-06-22
Payer: MEDICARE

## 2020-06-22 VITALS
TEMPERATURE: 98.8 F | HEIGHT: 72 IN | WEIGHT: 189.2 LBS | RESPIRATION RATE: 18 BRPM | DIASTOLIC BLOOD PRESSURE: 119 MMHG | SYSTOLIC BLOOD PRESSURE: 180 MMHG | OXYGEN SATURATION: 94 % | HEART RATE: 84 BPM | BODY MASS INDEX: 25.63 KG/M2

## 2020-06-22 DIAGNOSIS — R55 SYNCOPE, UNSPECIFIED SYNCOPE TYPE: ICD-10-CM

## 2020-06-22 DIAGNOSIS — K70.30 ALCOHOLIC CIRRHOSIS OF LIVER WITHOUT ASCITES (HCC): Primary | ICD-10-CM

## 2020-06-22 DIAGNOSIS — G56.03 BILATERAL CARPAL TUNNEL SYNDROME: ICD-10-CM

## 2020-06-22 PROCEDURE — 99213 OFFICE O/P EST LOW 20 MIN: CPT | Performed by: FAMILY MEDICINE

## 2020-06-22 RX ORDER — GABAPENTIN 600 MG/1
600 TABLET ORAL 2 TIMES DAILY
Qty: 60 TABLET | Refills: 2 | Status: SHIPPED | OUTPATIENT
Start: 2020-06-22 | End: 2020-07-22

## 2020-06-23 ENCOUNTER — TELEPHONE (OUTPATIENT)
Dept: NEUROLOGY | Facility: CLINIC | Age: 60
End: 2020-06-23

## 2020-06-25 DIAGNOSIS — G47.09 OTHER INSOMNIA: ICD-10-CM

## 2020-06-25 RX ORDER — TRAZODONE HYDROCHLORIDE 100 MG/1
100 TABLET ORAL
Qty: 90 TABLET | Refills: 0 | Status: SHIPPED | OUTPATIENT
Start: 2020-06-25 | End: 2020-09-17

## 2020-06-26 DIAGNOSIS — G47.09 OTHER INSOMNIA: ICD-10-CM

## 2020-06-26 RX ORDER — TRAZODONE HYDROCHLORIDE 50 MG/1
50 TABLET ORAL
Qty: 90 TABLET | Refills: 0 | Status: SHIPPED | OUTPATIENT
Start: 2020-06-26 | End: 2020-09-21

## 2020-07-15 ENCOUNTER — DOCUMENTATION (OUTPATIENT)
Dept: GASTROENTEROLOGY | Facility: CLINIC | Age: 60
End: 2020-07-15

## 2020-07-15 NOTE — PROGRESS NOTES
HPI:   I copied this from a prechart note  Patient did not show  Recent imaging and laboratory data  3/7/2020 CT scan abdomen pelvis without IV contrast     The wall of the distal esophagus appeared thickened  Paraesophageal varices were present  Liver demonstrates a cirrhotic morphology  A previously noted 7 mm hypervascular lesion in the right hepatic lobe seen in segment 6 on MRI 08/28/2029 was not well visualized on this examination  Splenomegaly measuring 15 6 cm there is a 3 9 x 3 3 cm area of splenic tissue in the region the pancreatic tail  06/09/2020 MRI with and without contrast abdomen:  Cirrhotic morphology of liver again seen  7 mm exophytic enhancing nodule arising from segment 6 is stable  Again there is no washout or types or enhancement  (LI-rRADS 3 -intermediate probability for [address] )   No other lesions identified  Hepatic veins and portal veins are patent  Pancreas appeared normal   Intra pancreatic splenule is again noted  Splenomegaly  Trace perihepatic ascites  06/09/2020 chemistry complete was normal aside from an albumin of 3 4  AST was 34 ALT 25  Sodium 140 creatinine was 0 94  CBC you know white count 3 91 hemoglobin 13 1 hematocrit 41 6 MCV 93 platelet count [id number]     4/11/2019 hepatitis C antibody reactive  Hepatitis-B surface antigen negative   hepatitis A IIgM nonreactive   hepatitis-B core IgM nonreactive  [facility]  2/12/2018   Hepatitis C RNA not detected  Hepatitis-B surface antibody negative  Hepatitis a antibody total negative  LISSETH negative   11/20/2018  Alpha fetoprotein tumor marker <11 (negative)      Over 60 minutes spent reviewing records imaging studies and laboratory data prior to patient's visit  11/2018 diagnosed with cirrhosis and hepatitis C when he presented to his old him with jaundice    04/02/2017 presented with bright red blood per rectum, diagnosis hemorrhoidal bleeding  12/22/2017 emergency room visit for alcohol intoxication  02/11/2018 present within the upper GI bleed  Noted to have a non STEMI   02/2018 the EGD revealed grade 2-3 esophageal varices status post banding  2/2018 colonoscopy revealed a the BMI transverse colon status post APC  Sigmoid colon polyp which was a tubular adenoma  Repeat colonoscopy in 3 years recommended  02/14/2018 paracentesis for about 1 4 L of fluid  I do not have fluid analysis held and  04/15/2018 GI bleed with hematemesis  Hospitalized from 04/15to 4/21  EGD reportedly revealed varices without obvious bleeding source  11/18/2018 hospitalized for lower GI bleeding  Flexible sigmoidoscopy at that time revealed hemorrhoids  11/2018 EGD revealed varices which were banded  I do not have the actual procedure report to review  04/22/2019 CT scan revealed questionable 1 cm hypervascular lesion hepatic dome  08/13/2019 GI bleed  CT scan was performed suggested a colitis  Flexible sigmoidoscopy revealed hemorrhoids otherwise normal    08/15/2019 EGD revealed small varices with red Geoff signs  Three bands were placed  03/07/2020 presented to [place] [id number] is with GI bleeding and hypotension and alcohol intoxication  Transferred to [facility]  Did have acute can trigger injury bedtime in addition to alcohol withdrawal   His meld at that time was 15 with a Child Zambrano class B  03/09/2020 EGD revealed small distal esophageal varices  Portal hypertensive gastropathy with oozing  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

## 2020-07-20 ENCOUNTER — TELEPHONE (OUTPATIENT)
Dept: PSYCHIATRY | Facility: CLINIC | Age: 60
End: 2020-07-20

## 2020-07-20 NOTE — TELEPHONE ENCOUNTER
Behavorial Health Outpatient Intake Questions    Referred by: PCP    Please advised interviewee that they need to answer all questions truthfully to allow for best care and any misrepresentations of information may affect their ability to be seen at this clinic   => Was this discussed? Yes     Behavorial Health Outpatient Intake History -     Presenting Problem (in patient's words): bi polar alcohol use     Are there any developmental disabilities? ? If yes, can they speak to you on the phone? If they are too limited to speak to you on phone, refer out No    Are you taking any psychiatric medications? Yes    => If yes, who prescribes?pcp If yes, are they injectable medications? no    Does the patient have a language barrier or hearing impairment? No    Have you been treated at [address] [place] [address] by a therapist or a doctor in the past? If yes, who? No    Has the patient been hospitalized for mental health? No   If yes, how long ago was last hospitalization and where was it? Do you actively use alcohol or marijuana or illegal substances? If yes, what and how much - refer out to Drug and alcohol treatment if use is excessive or daily use of illegal substances There is a documented history of alcohol abuse confirmed by the patient  Do you have a community treatment team or ? No    Legal History-     Does the patient have any history of arrests, group home/penitentiary time, or DUIs? No  If Yes-  1) What types of charges? 2) When were they last incarcerated? 3) Are they currently on parole or probation? Minor Child-    Who has custody of the child? Is there a custody agreement? If there is a custody agreement remind parent that they must bring a copy to the first appt or they will not be seen       Intake Team, please check with provider before scheduling if flags come up such as:  - complex case  - legal history (other than DUI)  - communication barrier concerns are present  - if, in your judgment, this needs further review    ACCEPTED as a patient Yes  => Appointment Date: 8/26/20 with Dr Elvie Obregon? No    Name of Insurance Co: medicare  Insurance ID#  Insurance Phone #  If ins is primary or secondary  If patient is a minor, parents information such as Name, D  O B of guarantor

## 2020-07-22 ENCOUNTER — OFFICE VISIT (OUTPATIENT)
Dept: FAMILY MEDICINE CLINIC | Facility: HOME HEALTHCARE | Age: 60
End: 2020-07-22
Payer: MEDICARE

## 2020-07-22 VITALS
BODY MASS INDEX: 25.44 KG/M2 | TEMPERATURE: 97.4 F | WEIGHT: 187.8 LBS | OXYGEN SATURATION: 97 % | DIASTOLIC BLOOD PRESSURE: 86 MMHG | SYSTOLIC BLOOD PRESSURE: 132 MMHG | RESPIRATION RATE: 18 BRPM | HEIGHT: 72 IN | HEART RATE: 78 BPM

## 2020-07-22 DIAGNOSIS — R55 SYNCOPE, UNSPECIFIED SYNCOPE TYPE: Primary | ICD-10-CM

## 2020-07-22 DIAGNOSIS — G56.03 BILATERAL CARPAL TUNNEL SYNDROME: ICD-10-CM

## 2020-07-22 PROCEDURE — 99213 OFFICE O/P EST LOW 20 MIN: CPT | Performed by: FAMILY MEDICINE

## 2020-07-22 RX ORDER — GABAPENTIN 600 MG/1
600 TABLET ORAL 3 TIMES DAILY
Qty: 90 TABLET | Refills: 2 | Status: SHIPPED | OUTPATIENT
Start: 2020-07-22 | End: 2020-10-25 | Stop reason: HOSPADM

## 2020-07-22 NOTE — PROGRESS NOTES
[address]       NAME: Bea Skinner is a 64 y o  male  : 1960    MRN: [id number]  DATE: 2020  TIME: 2:03 PM    Assessment and Plan   Diagnoses and all orders for this visit:    Syncope, unspecified syncope type  -     Ambulatory referral to Cardiology; Future    Bilateral carpal tunnel syndrome  -     gabapentin (NEURONTIN) 600 MG tablet; Take 1 tablet (600 mg total) by mouth 3 (three) times a day        No problem-specific Assessment & Plan notes found for this encounter  Patient Instructions           Chief Complaint     Chief Complaint   Patient presents with    Follow-up         History of Present Illness          Waldemar Kelley presents today for follow-up  He has a medical history significant for EtOH abuse, esophageal varices, GI bleed, hep C, and hypertension  Is known to GI, and recently had MRI to follow-up on a 3 6 cm lesion noted in the pancreatic tail during a recent hospitalization for GI bleed  MRI findings are below:     Cirrhotic liver with stable 7 mm enhancing nodule within segment 6  Based on the Liver-Imaging-Reporting and Data System lexicon version 2018, this is a LI-RADS 3 (intermediate probability for [address] ) lesion       For lesions without a definite diagnosis by imaging (LR-2, LR-3, LR-4), decisions between accelerated follow-up (shorter than standard surveillance interval), alternative imaging, biopsy or treatment without biopsy, do not follow directly from the   LI-RADS category or from a clinician's estimated probability of [address] , but rather from a clinical assessment that integrates all available medical information, including patient biomarkers, prior probability of developing or having HCC, co-morbidities and   patient preference         2  Splenomegaly and trace perihepatic ascites         He was scheduled to have a follow-up with GI earlier this month, review of records reveals that he no showed appointment    He reports that he has rescheduled for next month   Once again discussed the importance of following through with scheduled appointments and medications/treatment regimens  He previously had complained of syncope and was referred to Neurology and Cardiology, unfortunately he has not seen cardiology yet  Review of records reveals that neurology would like to hold off on scheduling an appointment until he is cleared by Cardiology  I reviewed this extensively multiple times with patient, he will schedule with Cardiology, and as an appointment scheduled with neurology next month  He does complain of intermittent episodes of dizziness  I did advise him that should these episodes occur or any syncope/near-syncope she should immediately go to the emergency room  He verbalizes understanding  Has an apt to est care with psychiatry  He states he is not sure with the appointment is for  I reminded him that we discussed this extensively it is previous visit, the purpose of his referral is for his history of ETOH abuse  He admits "to drinking once or twoce a week, not he hard stuff and I don't get loaded  I know I'm not supposed to drink at all but what are you gonna do"  I once again discussed with him the importance of complete and total alcohol cessation, and the effect of alcohol on cirrhosis/liver disease  He verbalizes agreement understanding  Will follow-up in 4-6 weeks to assess his progress on following through with these referral appointments  Review of Systems   Review of Systems   Constitutional: Negative for chills, fatigue, fever and unexpected weight change  HENT: Negative for postnasal drip, sinus pressure and sore throat  Eyes: Negative for discharge  Respiratory: Negative for chest tightness, shortness of breath and wheezing  Cardiovascular: Negative for chest pain  Gastrointestinal: Negative for constipation, diarrhea and vomiting  Musculoskeletal: Negative for arthralgias  Skin: Negative for rash     Neurological: Positive for dizziness and light-headedness  Negative for syncope           Current Medications       Current Outpatient Medications:     gabapentin (NEURONTIN) 600 MG tablet, Take 1 tablet (600 mg total) by mouth 3 (three) times a day, Disp: 90 tablet, Rfl: 2    lisinopril-hydrochlorothiazide (PRINZIDE,ZESTORETIC) 20-12 5 MG per tablet, Take 1 tablet by mouth daily, Disp: 90 tablet, Rfl: 0    traZODone (DESYREL) 100 mg tablet, Take 1 tablet (100 mg total) by mouth daily at bedtime, Disp: 90 tablet, Rfl: 0    traZODone (DESYREL) 50 mg tablet, Take 1 tablet (50 mg total) by mouth daily at bedtime, Disp: 90 tablet, Rfl: 0    carvedilol (COREG) 3 125 mg tablet, Take 1 tablet (3 125 mg total) by mouth 2 (two) times a day with meals, Disp: 180 tablet, Rfl: 0    LORazepam (ATIVAN) 0 5 mg tablet, Take 1 tablet (0 5 mg total) by mouth once for 1 dose, Disp: 1 tablet, Rfl: 0    spironolactone (ALDACTONE) 100 mg tablet, , Disp: , Rfl:     Current Allergies     Allergies as of 07/22/2020 - Reviewed 07/22/2020   Allergen Reaction Noted    Acetaminophen  12/22/2017    Hydrocodone  12/22/2017    Hydrocodone-acetaminophen Rash, Abdominal Pain, and GI Intolerance 12/22/2017            The following portions of the patient's history were reviewed and updated as appropriate: allergies, current medications, past family history, past medical history, past social history, past surgical history and problem list      Past Medical History:   Diagnosis Date    Arthritis     Esophageal varices ([address] )     Diagn[facility] in 2018    GI bleed     2018    Hepatitis C     History of transfusion     Hyperlipidemia     Hypertension     Psychiatric disorder     bipolar    Renal disorder        Past Surgical History:   Procedure Laterality Date    COLON SURGERY      colonoscopy    FRACTURE SURGERY      left thumb       Family History   Problem Relation Age of Onset    Cancer Mother          Medications have been verified  Objective   /86 (BP Location: Right arm, Patient Position: Sitting, Cuff Size: Adult)   Pulse 78   Temp (!) 97 4 °F (36 3 °C) (Tympanic)   Resp 18   Ht 6' (1 829 m)   Wt 85 2 kg (187 lb 12 8 oz)   SpO2 97%   BMI 25 47 kg/m²        Physical Exam     Physical Exam   Constitutional: He is oriented to person, place, and time  He appears well-developed and well-nourished  No distress  HENT:   Right Ear: External ear normal    Left Ear: External ear normal    Nose: Nose normal    Mouth/Throat: Oropharynx is clear and moist    Eyes: Pupils are equal, round, and reactive to light  EOM are normal    Neck: Normal range of motion  Neck supple  Cardiovascular: Normal rate, regular rhythm, normal heart sounds and intact distal pulses  Pulmonary/Chest: Effort normal and breath sounds normal    Abdominal: Soft  Musculoskeletal: Normal range of motion  Lymphadenopathy:     He has no cervical adenopathy  Neurological: He is alert and oriented to person, place, and time  Skin: Skin is warm and dry  Capillary refill takes less than 2 seconds  Psychiatric: He has a normal mood and affect  Nursing note and vitals reviewed

## 2020-07-22 NOTE — PATIENT INSTRUCTIONS
Patient once again encouraged to follow through with GI appointment, Cardiology, and Neurology appointment  Encouraged to abstain from alcohol use  Regular exercise and physical activity may help you control your weight  Diet and exercise play an important role in controlling your weight  Exercise strengthens your heart and improves your circulation  This lowers risks of heart disease  Exercise can lower your blood sugar level and help your insulin work better  This will cut down your risk of type 2 diabetes  Exercise can improve your mood and make you feel more relaxed  This can reduce your risk of depression

## 2020-08-12 ENCOUNTER — OFFICE VISIT (OUTPATIENT)
Dept: GASTROENTEROLOGY | Facility: CLINIC | Age: 60
End: 2020-08-12
Payer: COMMERCIAL

## 2020-08-12 VITALS
DIASTOLIC BLOOD PRESSURE: 102 MMHG | WEIGHT: 188 LBS | RESPIRATION RATE: 12 BRPM | BODY MASS INDEX: 26.92 KG/M2 | SYSTOLIC BLOOD PRESSURE: 172 MMHG | HEART RATE: 73 BPM | HEIGHT: 70 IN | TEMPERATURE: 97.9 F

## 2020-08-12 DIAGNOSIS — B18.2 CHRONIC HEPATITIS C WITHOUT HEPATIC COMA (HCC): Primary | ICD-10-CM

## 2020-08-12 DIAGNOSIS — K76.9 LIVER LESION, RIGHT LOBE: ICD-10-CM

## 2020-08-12 DIAGNOSIS — K70.30 ALCOHOLIC CIRRHOSIS OF LIVER WITHOUT ASCITES (HCC): ICD-10-CM

## 2020-08-12 DIAGNOSIS — I85.10 SECONDARY ESOPHAGEAL VARICES WITHOUT BLEEDING (HCC): Chronic | ICD-10-CM

## 2020-08-12 DIAGNOSIS — K86.89 PANCREATIC MASS: ICD-10-CM

## 2020-08-12 DIAGNOSIS — F10.20 ALCOHOL USE DISORDER, SEVERE, DEPENDENCE (HCC): ICD-10-CM

## 2020-08-12 DIAGNOSIS — K70.31 ALCOHOLIC CIRRHOSIS OF LIVER WITH ASCITES (HCC): ICD-10-CM

## 2020-08-12 PROCEDURE — 99205 OFFICE O/P NEW HI 60 MIN: CPT | Performed by: INTERNAL MEDICINE

## 2020-08-12 NOTE — ASSESSMENT & PLAN NOTE
Patient has alcohol use disorder and has complications to his alcohol continues to drink a few beers few times a week  I did encourage him to stop all alcohol use  I would have no objection to entering a program   That may be best for him

## 2020-08-12 NOTE — ASSESSMENT & PLAN NOTE
Patient has underlying cirrhosis of the liver likely secondary to alcohol use and hepatitis-C  His hepatitis C has been treated  His cirrhosis has been complicated by hepatic encephalopathy, ascites in addition to esophageal variceal bleeding  His last MELD using an INR from March of 2020 was 8  In March of 2020 his MELD was 15  I did explain to the patient that at this point time he seems to be fairly well compensated  I did ask outlined for him what decompensation means  I explained to him that he should call or come the hospital if he develops any black stools or vomiting blood which could mean bleeding from esophageal varices  He should call and or come the hospital if he develops a confusional state, fever, increased abdominal girth which could mean either hepatic encephalopathy or development of fluid the abdomen called ascities  He should follow a extremely restricted sodium diet such as a 2 g sodium diet  He currently does not add any salt to his food  I did advise him of the need to stop drinking alcohol altogether  I stated that basically he is taking a day or 2 off his life for every alcoholic beverage that he drinks at this point time  I did explain that patients with cirrhosis are at increased risk for liver cancer called hepatocellular carcinoma  He has been noted to have a small liver lesion which will be followed up  He should have an alpha fetoprotein and ultrasound done every 6 months  He is aware of the need to avoid NSAIDs such as Motrin, Advil, Aleve, ibuprofen, except  Please see comments under liver lesion  He should avoid swimming and non chloride made mari  He should also avoid eating undercooked or raw meat and seafood  I did also explain the need for a hepatology consultation  This would best be done at a [facility] that has a transplant program in case he gets into trouble and would need liver transplantation    He stated he does have transportation issues at this time  If he is not already immune to hepatitis a and hepatitis-B, he should be vaccinated for both hepatitis a and hepatitis-B  He states he is going to be in the process of doing that in the near future  He should also have a flu shot yearly  He should have a pneumococcal vaccine as directed by his primary care doctor as well

## 2020-08-12 NOTE — ASSESSMENT & PLAN NOTE
Patient does have a history of hepatitis-C  He was treated for this  I am not sure which regimen he received  I did notice that hepatitis-C RNA by PCR was negative in 2018  I will repeat hepatitis-C RNA to confirm eradication

## 2020-08-12 NOTE — ASSESSMENT & PLAN NOTE
Patient has a history of esophageal varices and esophageal variceal bleeding  His last endoscopy was performed in March of 2020 and is outlined below  It was noted in that report that he had small distal esophageal varices  They did not require banding at that time  He also had evidence of portal hypertensive gastropathy that was bleeding  Continue carvedilol 3 125 mg 2 times a day  He will need repeat upper endoscopy probably in March or April of 2021 or sooner if he develops decompensation

## 2020-08-12 NOTE — ASSESSMENT & PLAN NOTE
In reviewing CT scan dated March 7, 2020  It is noted there is splenic tissue measuring 3 9 x 3 3 cm seen in the region the pancreatic tail  This was unchanged from prior study

## 2020-08-12 NOTE — PATIENT INSTRUCTIONS
Hepatic cirrhosis ([facility] [address] )  Patient has underlying cirrhosis of the liver likely secondary to alcohol use and hepatitis-C  His hepatitis C has been treated  His cirrhosis has been complicated by hepatic encephalopathy, ascites in addition to esophageal variceal bleeding  His last MELD using an INR from March of 2020 was 8  In March of 2020 his MELD was 15  I did explain to the patient that at this point time he seems to be fairly well compensated  I did ask outlined for him what decompensation means  I explained to him that he should call or come the hospital if he develops any black stools or vomiting blood which could mean bleeding from esophageal varices  He should call and or come the hospital if he develops a confusional state, fever, increased abdominal girth which could mean either hepatic encephalopathy or development of fluid the abdomen called ascities  He should follow a extremely restricted sodium diet such as a 2 g sodium diet  He currently does not add any salt to his food  I did advise him of the need to stop drinking alcohol altogether  I stated that basically he is taking a day or 2 off his life for every alcoholic beverage that he drinks at this point time  I did explain that patients with cirrhosis are at increased risk for liver cancer called hepatocellular carcinoma  He has been noted to have a small liver lesion which will be followed up  He should have an alpha fetoprotein and ultrasound done every 6 months  He is aware of the need to avoid NSAIDs such as Motrin, Advil, Aleve, ibuprofen, except  Please see comments under liver lesion  He should avoid swimming and non chloride made mari  He should also avoid eating undercooked or raw meat and seafood  I did also explain the need for a hepatology consultation  This would best be done at a [facility] center that has a transplant program in case he gets into trouble and would need liver transplantation    He stated he does have transportation issues at this time  If he is not already immune to hepatitis a and hepatitis-B, he should be vaccinated for both hepatitis a and hepatitis-B  He states he is going to be in the process of doing that in the near future  He should also have a flu shot yearly  He should have a pneumococcal vaccine as directed by his primary care doctor as well  Hepatitis C  Patient does have a history of hepatitis-C  He was treated for this  I am not sure which regimen he received  I did notice that hepatitis-C RNA by PCR was negative in 2018  I will repeat hepatitis-C RNA to confirm eradication  Esophageal varices (HCC)  Patient has a history of esophageal varices and esophageal variceal bleeding  His last endoscopy was performed in March of 2020 and is outlined below  It was noted in that report that he had small distal esophageal varices  They did not require banding at that time  He also had evidence of portal hypertensive gastropathy that was bleeding  Continue carvedilol 3 125 mg 2 times a day  He will need repeat upper endoscopy probably in March or April of 2021 or sooner if he develops decompensation  Alcohol use disorder, severe, dependence ([facility] [address] )  Patient has alcohol use disorder and has complications to his alcohol continues to drink a few beers few times a week  I did encourage him to stop all alcohol use  I would have no objection to entering a program   That may be best for him  Pancreatic mass  In reviewing CT scan dated March 7, 2020  It is noted there is splenic tissue measuring 3 9 x 3 3 cm seen in the region the pancreatic tail  This was unchanged from prior study  Liver lesion, right lobe  MRI from 06/09/2020 revealed a cirrhotic morphology of the liver  There is a 7 mm exophytic enhancing nodule arising from segment 6 which was stable  There is no washout or capsular enhancement    This was felt to be an LI-RADS 3 (intermediate probability for [facility] [address] ) see lesion  I would recommend checking an MRI of the liver in October to follow-up on this liver lesion    Check alpha fetoprotein in October

## 2020-08-12 NOTE — PROGRESS NOTES
[facility]  Gastroenterology Outpatient Consultation  Patient Kate Armenta   Age 64 y o  Gender male   MRN: [id number]  [facility] [id number]     ASSESSMENT AND PLAN:   Problem List Items Addressed This Visit        Digestive    Hepatitis C - Primary     Patient does have a history of hepatitis-C  He was treated for this  I am not sure which regimen he received  I did notice that hepatitis-C RNA by PCR was negative in 2018  I will repeat hepatitis-C RNA to confirm eradication  Hepatic cirrhosis ([facility] [address] )     Patient has underlying cirrhosis of the liver likely secondary to alcohol use and hepatitis-C  His hepatitis C has been treated  His cirrhosis has been complicated by hepatic encephalopathy, ascites in addition to esophageal variceal bleeding  His last MELD using an INR from March of 2020 was 8  In March of 2020 his MELD was 15  I did explain to the patient that at this point time he seems to be fairly well compensated  I did ask outlined for him what decompensation means  I explained to him that he should call or come the hospital if he develops any black stools or vomiting blood which could mean bleeding from esophageal varices  He should call and or come the hospital if he develops a confusional state, fever, increased abdominal girth which could mean either hepatic encephalopathy or development of fluid the abdomen called ascities  He should follow a extremely restricted sodium diet such as a 2 g sodium diet  He currently does not add any salt to his food  I did advise him of the need to stop drinking alcohol altogether  I stated that basically he is taking a day or 2 off his life for every alcoholic beverage that he drinks at this point time  I did explain that patients with cirrhosis are at increased risk for liver cancer called hepatocellular carcinoma  He has been noted to have a small liver lesion which will be followed up    He should have an alpha fetoprotein and ultrasound done every 6 months  He is aware of the need to avoid NSAIDs such as Motrin, Advil, Aleve, ibuprofen, except  Please see comments under liver lesion  He should avoid swimming and non chloride made mari  He should also avoid eating undercooked or raw meat and seafood  I did also explain the need for a hepatology consultation  This would best be done at a [place] that has a transplant program in case he gets into trouble and would need liver transplantation  He stated he does have transportation issues at this time  If he is not already immune to hepatitis a and hepatitis-B, he should be vaccinated for both hepatitis a and hepatitis-B  He states he is going to be in the process of doing that in the near future  He should also have a flu shot yearly  He should have a pneumococcal vaccine as directed by his primary care doctor as well  Cardiovascular and Mediastinum    Esophageal varices (HCC) (Chronic)     Patient has a history of esophageal varices and esophageal variceal bleeding  His last endoscopy was performed in March of 2020 and is outlined below  It was noted in that report that he had small distal esophageal varices  They did not require banding at that time  He also had evidence of portal hypertensive gastropathy that was bleeding  Continue carvedilol 3 125 mg 2 times a day  He will need repeat upper endoscopy probably in March or April of 2021 or sooner if he develops decompensation  Other    Pancreatic mass     In reviewing CT scan dated March 7, 2020  It is noted there is splenic tissue measuring 3 9 x 3 3 cm seen in the region the pancreatic tail  This was unchanged from prior study  Alcohol use disorder, severe, dependence ([facility] [address] )     Patient has alcohol use disorder and has complications to his alcohol continues to drink a few beers few times a week  I did encourage him to stop all alcohol use    I would have no objection to entering a program   That may be best for him  Liver lesion, right lobe     MRI from 06/09/2020 revealed a cirrhotic morphology of the liver  There is a 7 mm exophytic enhancing nodule arising from segment 6 which was stable  There is no washout or capsular enhancement  This was felt to be an LI-RADS 3 (intermediate probability for [address] ) see lesion  I would recommend checking an MRI of the liver in October to follow-up on this liver lesion  Check alpha fetoprotein in October              _____________________________________________________________    HPI:  Patient is 64years of age  He has a rather complex medical history which is outlined below  He does have a history of hepatitis C which she may have acquired through IV drug use in his teenage years  He was treated for hepatitis C out of [facility] in [place] and he thinks it was for 5 years ago  He was told he did have a response to treatment  He has had a variety of gastrointestinal bleeding that are outlined below as well  Looks like his last upper endoscopy was March of 2020  He has not had any bleeding since then  He does not even remember vomiting blood back then  His bowel habits are very regular  He has not noticed much lot of rectal bleeding  He does report having hemorrhoids that may bleed at times  He denies any increased abdominal girth peripheral edema  He does however report a poor memory and being confused at times  He has not had significant weight gain  In fact he has lost 15 to 20 lb over last 1-2 years  His appetite varies but good for the most part  He does report that he will have a few beers a couple times a week  He denies any abdominal pain  He denies any jaundice  Denies any heartburn indigestion or dysphagia  He does not take NSAIDs  There is no family history colon cancer colon polyps      Records reviewed for 1 hour prior to visit  Recent imaging and laboratory data  3/7/2020 CT scan abdomen pelvis without IV contrast     The wall of the distal esophagus appeared thickened  Paraesophageal varices were present  Liver demonstrates a cirrhotic morphology  A previously noted 7 mm hypervascular lesion in the right hepatic lobe seen in segment 6 on MRI 08/28/2029 was not well visualized on this examination  Splenomegaly measuring 15 6 cm there is a 3 9 x 3 3 cm area of splenic tissue in the region the pancreatic tail      06/09/2020 MRI with and without contrast abdomen:  Cirrhotic morphology of liver again seen  7 mm exophytic enhancing nodule arising from segment 6 is stable  Again there is no washout or types or enhancement  (LI-rRADS 3 -intermediate probability for [facility] [address] )   No other lesions identified  Hepatic veins and portal veins are patent  Pancreas appeared normal   Intra pancreatic splenule is again noted  Splenomegaly  Trace perihepatic ascites      06/09/2020 chemistry complete was normal aside from an albumin of 3 4  AST was 34 ALT 25  Sodium 140 creatinine was 0 94  CBC Low WBC 3 91 hemoglobin 13 1 hematocrit 41 6 MCV 93 platelet count [id number]      4/11/2019 hepatitis C antibody reactive  Hepatitis-B surface antigen negative   hepatitis A IIgM nonreactive   hepatitis-B core IgM nonreactive      Reviewing records from [facility]  2/12/2018   Hepatitis C RNA not detected  Hepatitis-B surface antibody negative  Hepatitis a antibody total negative  LISSETH negative   11/20/2018  Alpha fetoprotein tumor marker <11 (negative)        Over 60 minutes spent reviewing records imaging studies and laboratory data prior to patient's visit  04/02/2017 presented with bright red blood per rectum, diagnosis hemorrhoidal bleeding  12/22/2017 emergency room visit for alcohol intoxication  02/11/2018 present within the upper GI bleed  Noted to have a non STEMI   02/2018 the EGD revealed grade 2-3 esophageal varices status post banding    2/2018 colonoscopy revealed a the BMI transverse colon status post APC  Sigmoid colon polyp which was a tubular adenoma  Repeat colonoscopy in 3 years recommended  02/14/2018 paracentesis for about 1 4 L of fluid  I do not have fluid analysis held and  04/15/2018 GI bleed with hematemesis  Hospitalized from 04/15to 4/21  EGD reportedly revealed varices without obvious bleeding source  11/18/2018 hospitalized for lower GI bleeding  Flexible sigmoidoscopy at that time revealed hemorrhoids  11/2018 EGD revealed varices which were banded  I do not have the actual procedure report to review  04/22/2019 CT scan revealed questionable 1 cm hypervascular lesion hepatic dome  08/13/2019 GI bleed  CT scan was performed suggested a colitis  Flexible sigmoidoscopy revealed hemorrhoids otherwise normal    08/15/2019 EGD revealed small varices with red Geoff signs  Three bands were placed  03/07/2020 presented to [place] [id number] is with GI bleeding and hypotension and alcohol intoxication  Transferred to [facility]  Did have acute can trigger injury bedtime in addition to alcohol withdrawal   His meld at that time was 13 with a Child Zambrano class B  03/09/2020 EGD revealed small distal esophageal varices  Portal hypertensive gastropathy with oozing  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      Allergies   Allergen Reactions    Acetaminophen     Hydrocodone     Hydrocodone-Acetaminophen Rash, Abdominal Pain and GI Intolerance     Current Outpatient Medications   Medication Sig Dispense Refill    carvedilol (COREG) 3 125 mg tablet Take 1 tablet (3 125 mg total) by mouth 2 (two) times a day with meals 180 tablet 0    gabapentin (NEURONTIN) 600 MG tablet Take 1 tablet (600 mg total) by mouth 3 (three) times a day 90 tablet 2    lisinopril-hydrochlorothiazide (PRINZIDE,ZESTORETIC) 20-12 5 MG per tablet Take 1 tablet by mouth daily 90 tablet 0    traZODone (DESYREL) 100 mg tablet Take 1 tablet (100 mg total) by mouth daily at bedtime 90 tablet 0    traZODone (DESYREL) 50 mg tablet Take 1 tablet (50 mg total) by mouth daily at bedtime 90 tablet 0    LORazepam (ATIVAN) 0 5 mg tablet Take 1 tablet (0 5 mg total) by mouth once for 1 dose 1 tablet 0    spironolactone (ALDACTONE) 100 mg tablet        No current facility-administered medications for this visit  MEDICAL HISTORY:  Past Medical History:   Diagnosis Date    Arthritis     Esophageal varices ([address] )     Diagnosed in [facility] in 2018    GI bleed     2018    Hepatitis C     History of transfusion     Hyperlipidemia     Hypertension     Psychiatric disorder     bipolar    Renal disorder      Past Surgical History:   Procedure Laterality Date    COLON SURGERY      colonoscopy    FRACTURE SURGERY      left thumb     Social History     Substance and Sexual Activity   Alcohol Use Yes    Frequency: 4 or more times a week    Drinks per session: 5 or 6    Binge frequency: Weekly    Comment: ashleigh 24oz cans 6 a day     Social History     Substance and Sexual Activity   Drug Use No     Social History     Tobacco Use   Smoking Status Current Every Day Smoker    Packs/day: 0 50    Types: Cigarettes   Smokeless Tobacco Former User    Types: Chew   Tobacco Comment    cant afford patches     Family History   Problem Relation Age of Onset    Cancer Mother        REVIEW OF SYSTEMS:  CONSTITUTIONAL: Denies any fever, chills, rigors, and weight loss  HEENT: No earache or tinnitus  Denies hearing loss or visual disturbances  CARDIOVASCULAR: No chest pain or palpitations  RESPIRATORY: Denies any cough, hemoptysis, shortness of breath or dyspnea on exertion  GASTROINTESTINAL: As noted in the History of Present Illness  GENITOURINARY: No problems with urination  Denies any hematuria or dysuria  NEUROLOGIC:  He does report feeling disoriented times  Does report some memory problems  He also reports some dizziness  He does report having syncopal episodes    He states that ever times very just found himself on the ground  Other times he feels that coming any will sit down  Patient has cirrhosis  MUSCULOSKELETAL: Denies any muscle   Does report left ankle pain  SKIN: Denies skin rashes or itching  ENDOCRINE: Denies excessive thirst  Denies intolerance to heat or cold  PSYCHOSOCIAL:  He does report bipolar disorder with both anxiety and depression  Objective   Blood pressure (!) 172/102, pulse 73, temperature 97 9 °F (36 6 °C), temperature source Temporal, resp  rate 12, height 5' 10" (1 778 m), weight 85 3 kg (188 lb)  Body mass index is 26 98 kg/m²  PHYSICAL EXAM:   General Appearance: Alert, cooperative, no distress  HEENT: Normocephalic, atraumatic, anicteric  Neck: Supple, symmetrical, trachea midline  Few luis angiomas anter chest wall  Lungs: Clear to auscultation bilaterally; no rales, rhonchi or wheezing; respirations unlabored   Heart: Regular rate and rhythm; no murmur, rub, or gallop  Abdomen: Soft, bowel sounds normal, non-tender, non-distended; no masses  Prominent caudate lobe  Spleen tip not palpable  Genitalia: Deferred   Rectal: Deferred   Extremities: No cyanosis, clubbing or edema   There is no asterixis  Skin: No jaundice, rashes, or lesions   Lymph nodes: No palpable cervical lymphadenopathy   Lab Results:   No visits with results within 2 Month(s) from this visit     Latest known visit with results is:   Appointment on 06/09/2020   Component Date Value    WBC 06/09/2020 3 91*    RBC 06/09/2020 4 49     Hemoglobin 06/09/2020 13 1     Hematocrit 06/09/2020 41 6     MCV 06/09/2020 93     MCH 06/09/2020 29 2     MCHC 06/09/2020 31 5     RDW 06/09/2020 16 1*    MPV 06/09/2020 9 8     Platelets 40/98/2829 73*    nRBC 06/09/2020 0     Neutrophils Relative 06/09/2020 73     Immat GRANS % 06/09/2020 1     Lymphocytes Relative 06/09/2020 15     Monocytes Relative 06/09/2020 9     Eosinophils Relative 06/09/2020 1     Basophils Relative 06/09/2020 1     Neutrophils Absolute 06/09/2020 2 89     Immature Grans Absolute 06/09/2020 0 02     Lymphocytes Absolute 06/09/2020 0 58*    Monocytes Absolute 06/09/2020 0 36     Eosinophils Absolute 06/09/2020 0 04     Basophils Absolute 06/09/2020 0 02     Sodium 06/09/2020 140     Potassium 06/09/2020 4 0     Chloride 06/09/2020 105     CO2 06/09/2020 28     ANION GAP 06/09/2020 7     BUN 06/09/2020 11     Creatinine 06/09/2020 0 94     Glucose 06/09/2020 127     Calcium 06/09/2020 8 7     AST 06/09/2020 34     ALT 06/09/2020 25     Alkaline Phosphatase 06/09/2020 60     Total Protein 06/09/2020 7 4     Albumin 06/09/2020 3 4*    Total Bilirubin 06/09/2020 1 00     eGFR 06/09/2020 89     Cholesterol 06/09/2020 159     Triglycerides 06/09/2020 87     HDL, Direct 06/09/2020 58     LDL Calculated 06/09/2020 84     Hemoglobin A1C 06/09/2020 5 2     EAG 06/09/2020 103     TSH 3RD GENERATON 06/09/2020 1  110     Bilirubin, Direct 06/09/2020 0 34*     Radiology Results:   No results found    One Mariellenicho Dulce Maria,    08/12/20   Cc:

## 2020-08-14 ENCOUNTER — TELEPHONE (OUTPATIENT)
Dept: FAMILY MEDICINE CLINIC | Facility: CLINIC | Age: 60
End: 2020-08-14

## 2020-08-14 DIAGNOSIS — K70.31 ALCOHOLIC CIRRHOSIS OF LIVER WITH ASCITES (HCC): Primary | ICD-10-CM

## 2020-08-14 DIAGNOSIS — I85.11 SECONDARY ESOPHAGEAL VARICES WITH BLEEDING (HCC): Chronic | ICD-10-CM

## 2020-08-14 NOTE — PROGRESS NOTES
Can you please help me with this, I am trying to put the referral for Dr Maria Antonia Christian but he is coming up ortho  I am not sure what I am doing wrong

## 2020-08-14 NOTE — TELEPHONE ENCOUNTER
[facility] by Norma Ha

## 2020-08-14 NOTE — TELEPHONE ENCOUNTER
Can you please help me with this, I am trying to put the referral for Dr Chula Sun but he is coming up ortho  I am not sure what I am doing wrong

## 2020-08-14 NOTE — TELEPHONE ENCOUNTER
Dr Valerio Abdalla listed as ortho at [facility] is the correct doctor  Confirmed with Dr Delia Duval

## 2020-08-18 NOTE — TELEPHONE ENCOUNTER
Spoke to [facility]/ Dr Valerio Abdalla    She confirmed patient information is registered  Dr Valerio Abdalla will review information and follow up with patient

## 2020-08-25 ENCOUNTER — OFFICE VISIT (OUTPATIENT)
Dept: FAMILY MEDICINE CLINIC | Facility: HOME HEALTHCARE | Age: 60
End: 2020-08-25
Payer: COMMERCIAL

## 2020-08-25 VITALS
SYSTOLIC BLOOD PRESSURE: 146 MMHG | BODY MASS INDEX: 27.57 KG/M2 | RESPIRATION RATE: 16 BRPM | TEMPERATURE: 98 F | HEART RATE: 85 BPM | OXYGEN SATURATION: 97 % | HEIGHT: 70 IN | WEIGHT: 192.6 LBS | DIASTOLIC BLOOD PRESSURE: 82 MMHG

## 2020-08-25 DIAGNOSIS — Z91.19 NONCOMPLIANCE: ICD-10-CM

## 2020-08-25 DIAGNOSIS — F10.20 ALCOHOL USE DISORDER, SEVERE, DEPENDENCE (HCC): ICD-10-CM

## 2020-08-25 DIAGNOSIS — K70.31 ALCOHOLIC CIRRHOSIS OF LIVER WITH ASCITES (HCC): ICD-10-CM

## 2020-08-25 DIAGNOSIS — I85.11 SECONDARY ESOPHAGEAL VARICES WITH BLEEDING (HCC): Chronic | ICD-10-CM

## 2020-08-25 DIAGNOSIS — Z23 IMMUNIZATION DUE: Primary | ICD-10-CM

## 2020-08-25 DIAGNOSIS — I10 ESSENTIAL HYPERTENSION: ICD-10-CM

## 2020-08-25 PROCEDURE — 99213 OFFICE O/P EST LOW 20 MIN: CPT | Performed by: FAMILY MEDICINE

## 2020-08-25 RX ORDER — LISINOPRIL AND HYDROCHLOROTHIAZIDE 20; 12.5 MG/1; MG/1
1 TABLET ORAL DAILY
Qty: 30 TABLET | Refills: 2 | Status: SHIPPED | OUTPATIENT
Start: 2020-08-25 | End: 2020-10-25 | Stop reason: HOSPADM

## 2020-08-25 RX ORDER — CARVEDILOL 6.25 MG/1
3.12 TABLET ORAL 2 TIMES DAILY WITH MEALS
Qty: 30 TABLET | Refills: 2 | Status: SHIPPED | OUTPATIENT
Start: 2020-08-25 | End: 2020-08-25

## 2020-08-25 RX ORDER — CARVEDILOL 6.25 MG/1
6.25 TABLET ORAL 2 TIMES DAILY WITH MEALS
Qty: 60 TABLET | Refills: 2 | Status: SHIPPED | OUTPATIENT
Start: 2020-08-25 | End: 2020-10-25 | Stop reason: HOSPADM

## 2020-08-25 RX ORDER — LISINOPRIL AND HYDROCHLOROTHIAZIDE 20; 12.5 MG/1; MG/1
2 TABLET ORAL DAILY
Qty: 60 TABLET | Refills: 2 | Status: SHIPPED | OUTPATIENT
Start: 2020-08-25 | End: 2020-08-25

## 2020-08-25 RX ORDER — CARVEDILOL 3.12 MG/1
3.12 TABLET ORAL 2 TIMES DAILY WITH MEALS
Qty: 180 TABLET | Refills: 0 | Status: SHIPPED | OUTPATIENT
Start: 2020-08-25 | End: 2020-08-25

## 2020-08-25 NOTE — PROGRESS NOTES
[address]       NAME: David Porter is a 64 y o  male  : 1960    MRN: [id number]  DATE: 2020  TIME: 12:02 PM    Assessment and Plan   Diagnoses and all orders for this visit:    Immunization due    Alcoholic cirrhosis of liver with ascites ([facility] )    Secondary esophageal varices with bleeding ([facility] )    Alcohol use disorder, severe, dependence ([facility] )  -     Ambulatory referral to Psychiatry; Future    Noncompliance    Essential hypertension  -     Discontinue: lisinopril-hydrochlorothiazide (PRINZIDE,ZESTORETIC) 20-12 5 MG per tablet; Take 2 tablets by mouth daily  -     Discontinue: carvedilol (COREG) 3 125 mg tablet; Take 1 tablet (3 125 mg total) by mouth 2 (two) times a day with meals  -     lisinopril-hydrochlorothiazide (PRINZIDE,ZESTORETIC) 20-12 5 MG per tablet; Take 1 tablet by mouth daily  -     Discontinue: carvedilol (COREG) 6 25 mg tablet; Take 0 5 tablets (3 125 mg total) by mouth 2 (two) times a day with meals  -     carvedilol (COREG) 6 25 mg tablet; Take 1 tablet (6 25 mg total) by mouth 2 (two) times a day with meals    Other orders  -     Cancel: Hepatitis A hepatitis B combined vaccine IM  -     Cancel: Pneumococcal polysaccharide vaccine 23-valent greater than or equal to 1yo subcutaneous/IM        No problem-specific Assessment & Plan notes found for this encounter  Patient Instructions           Chief Complaint     Chief Complaint   Patient presents with    Follow-up     unable to see cardio due to transportation issues    Cirrhosis     alcoholic          History of Present Illness       James presents today for follow-up  He has a medical history significant for EtOH abuse, esophageal varices, GI bleed, hep C (treated), hepatic encephalopathy, and hypertension  Is known to GI, and scheduled for repeat imaging to follow-up on a liver lesion,  as well as alpha fetoprotein in October    He was referred to [facility] for transplant workup in the event that his status should worsen  He is in the process of trying to arrange transportation to schedule this appointment  Unfortunately he admits to continuing to drink several beers a week  We once again discussed the importance of complete and total alcohol cessation  He previously was referred to Psychiatry for his history of ETOH abuse  He once again presents and states that he is not sure when his appointment is, nor what it is for  Provided him with the number for the clinic so that he can verify his appointment, he verbalizes agreement understanding  He previously had complained of syncope and was referred to Neurology and Cardiology, unfortunately he has not seen cardiology yet  Review of records reveals that neurology would like to hold off on scheduling an appointment until he is cleared by Cardiology  I reviewed this extensively multiple times with patient, he will schedule with Cardiology  He denies any current rectal bleeding, but does report a history of internal and external hemorrhoids with also a history of rectal bleeding  We extensively reviewed emergent symptoms which would require immediate follow-up  He is aware that he should avoid all NSAIDs  Reports that blood pressure measurements of home have been over 170  He denies any chest pain, palpitations, or shortness of breath  Is agreeable to increasing Coreg, he will call if systolic blood pressures below 120, or over 150                                Review of Systems   Review of Systems   Constitutional: Negative for chills, fatigue, fever and unexpected weight change  HENT: Negative for postnasal drip, sinus pressure and sore throat  Eyes: Negative for discharge  Respiratory: Negative for chest tightness, shortness of breath and wheezing  Cardiovascular: Negative for chest pain  Gastrointestinal: Negative for constipation, diarrhea and vomiting          He reports internal and external Hemorids, + hx of rectal bleeding, denies any episodes in the last few months  We discussed emergent s/s requiring immediate follow up     Musculoskeletal: Positive for arthralgias  Skin: Negative  Negative for rash  Neurological: Negative for dizziness and syncope  Psychiatric/Behavioral: Negative            Current Medications       Current Outpatient Medications:     carvedilol (COREG) 6 25 mg tablet, Take 1 tablet (6 25 mg total) by mouth 2 (two) times a day with meals, Disp: 60 tablet, Rfl: 2    gabapentin (NEURONTIN) 600 MG tablet, Take 1 tablet (600 mg total) by mouth 3 (three) times a day, Disp: 90 tablet, Rfl: 2    lisinopril-hydrochlorothiazide (PRINZIDE,ZESTORETIC) 20-12 5 MG per tablet, Take 1 tablet by mouth daily, Disp: 30 tablet, Rfl: 2    LORazepam (ATIVAN) 0 5 mg tablet, Take 1 tablet (0 5 mg total) by mouth once for 1 dose, Disp: 1 tablet, Rfl: 0    spironolactone (ALDACTONE) 100 mg tablet, , Disp: , Rfl:     traZODone (DESYREL) 100 mg tablet, Take 1 tablet (100 mg total) by mouth daily at bedtime, Disp: 90 tablet, Rfl: 0    traZODone (DESYREL) 50 mg tablet, Take 1 tablet (50 mg total) by mouth daily at bedtime, Disp: 90 tablet, Rfl: 0    Current Allergies     Allergies as of 08/25/2020 - Reviewed 08/25/2020   Allergen Reaction Noted    Acetaminophen  12/22/2017    Hydrocodone  12/22/2017    Hydrocodone-acetaminophen Rash, Abdominal Pain, and GI Intolerance 12/22/2017            The following portions of the patient's history were reviewed and updated as appropriate: allergies, current medications, past family history, past medical history, past social history, past surgical history and problem list      Past Medical History:   Diagnosis Date    Arthritis     Esophageal varices ([address] )     Diagnosed in [place] in 2018    GI bleed     2018    Hepatitis C     History of transfusion     Hyperlipidemia     Hypertension     Psychiatric disorder     bipolar    Renal disorder        Past Surgical History:   Procedure Laterality Date    COLON SURGERY      colonoscopy    FRACTURE SURGERY      left thumb       Family History   Problem Relation Age of Onset    Cancer Mother          Medications have been verified  Objective   /82   Pulse 85   Temp 98 °F (36 7 °C)   Resp 16   Ht 5' 10" (1 778 m)   Wt 87 4 kg (192 lb 9 6 oz)   SpO2 97%   BMI 27 64 kg/m²        Physical Exam     Physical Exam  Vitals signs and nursing note reviewed  Constitutional:       General: He is not in acute distress  Appearance: He is well-developed  HENT:      Right Ear: External ear normal       Left Ear: External ear normal       Nose: Nose normal    Eyes:      Pupils: Pupils are equal, round, and reactive to light  Neck:      Musculoskeletal: Normal range of motion and neck supple  Cardiovascular:      Rate and Rhythm: Normal rate and regular rhythm  Heart sounds: Normal heart sounds  Pulmonary:      Effort: Pulmonary effort is normal       Breath sounds: Normal breath sounds  Abdominal:      Palpations: Abdomen is soft  Musculoskeletal: Normal range of motion  Lymphadenopathy:      Cervical: No cervical adenopathy  Skin:     General: Skin is warm and dry  Capillary Refill: Capillary refill takes less than 2 seconds  Neurological:      Mental Status: He is alert and oriented to person, place, and time

## 2020-09-01 ENCOUNTER — TELEPHONE (OUTPATIENT)
Dept: FAMILY MEDICINE CLINIC | Facility: HOME HEALTHCARE | Age: 60
End: 2020-09-01

## 2020-09-01 DIAGNOSIS — R29.6 FREQUENT FALLS: Primary | ICD-10-CM

## 2020-09-01 NOTE — TELEPHONE ENCOUNTER
Spoke to patient, Damien Yeager faxed to [facility] in [facility]    ----- Message from KAIT Curry sent at 9/1/2020  3:47 AM EDT -----  Due to his hx of etoh and falls I think a pt referral would be appropriate to determine what the deficit is    ----- Message -----  From: Jose Resendiz MA  Sent: 8/28/2020  10:26 AM EDT  To: KAIT Curry    Patient stated that he use to have one to get around in his apartment    ----- Message -----  From: KAIT Curry  Sent: 8/27/2020   1:26 PM EDT  To: Jose Resendiz MA    I do not really have an indication to order a walker at this time? Did he give a reason as to why he wants it?  ----- Message -----  From: Jose Resendiz MA  Sent: 8/27/2020  11:21 AM EDT  To: KAIT Curry    Pt is requesting a walker  I dont see a supporting DX code for insurance purposes  Any suggestion?    Ty NM

## 2020-09-02 DIAGNOSIS — R29.6 FALLS FREQUENTLY: Primary | ICD-10-CM

## 2020-09-15 NOTE — TELEPHONE ENCOUNTER
Called Dr Mansoor Chand office to follow up on referral       Patient was offered virtual visit but declined due to problems with his e mail  In person visit offered to patient but declined due to transportation issues  Patient said he would check with his insurance provider for transportation options and call back  Follow up office visit GI 10/14/20

## 2020-09-17 DIAGNOSIS — G47.09 OTHER INSOMNIA: ICD-10-CM

## 2020-09-17 RX ORDER — TRAZODONE HYDROCHLORIDE 100 MG/1
TABLET ORAL
Qty: 90 TABLET | Refills: 0 | Status: SHIPPED | OUTPATIENT
Start: 2020-09-17 | End: 2020-10-25 | Stop reason: HOSPADM

## 2020-09-19 DIAGNOSIS — G47.09 OTHER INSOMNIA: ICD-10-CM

## 2020-09-21 RX ORDER — TRAZODONE HYDROCHLORIDE 50 MG/1
TABLET ORAL
Qty: 90 TABLET | Refills: 0 | Status: SHIPPED | OUTPATIENT
Start: 2020-09-21 | End: 2020-10-25 | Stop reason: HOSPADM

## 2020-09-29 ENCOUNTER — TELEPHONE (OUTPATIENT)
Dept: NEUROLOGY | Facility: CLINIC | Age: 60
End: 2020-09-29

## 2020-09-29 NOTE — TELEPHONE ENCOUNTER
Left message for patient confirming the appointment with Dr Ramos Young on 10/6/20 at 3 pm in the [facility] office  Asking for patient to call the office to confirm that they will be keeping the appointment  Please discuss and document

## 2020-10-09 ENCOUNTER — HOSPITAL ENCOUNTER (INPATIENT)
Facility: HOSPITAL | Age: 60
LOS: 16 days | Discharge: NURSING, REHAB OR LONG-TERM CARE | DRG: 896 | End: 2020-10-25
Attending: EMERGENCY MEDICINE | Admitting: FAMILY MEDICINE
Payer: COMMERCIAL

## 2020-10-09 ENCOUNTER — APPOINTMENT (EMERGENCY)
Dept: CT IMAGING | Facility: HOSPITAL | Age: 60
DRG: 896 | End: 2020-10-09
Payer: COMMERCIAL

## 2020-10-09 ENCOUNTER — APPOINTMENT (EMERGENCY)
Dept: RADIOLOGY | Facility: HOSPITAL | Age: 60
DRG: 896 | End: 2020-10-09
Payer: COMMERCIAL

## 2020-10-09 DIAGNOSIS — S99.912A INJURY OF LEFT ANKLE, INITIAL ENCOUNTER: ICD-10-CM

## 2020-10-09 DIAGNOSIS — F10.929 ALCOHOL INTOXICATION (HCC): Primary | ICD-10-CM

## 2020-10-09 DIAGNOSIS — F10.929 ALCOHOLIC INTOXICATION WITH COMPLICATION (HCC): ICD-10-CM

## 2020-10-09 DIAGNOSIS — I10 ESSENTIAL HYPERTENSION WITH GOAL BLOOD PRESSURE LESS THAN 140/90: Chronic | ICD-10-CM

## 2020-10-09 DIAGNOSIS — R41.82 ALTERED MENTAL STATUS: ICD-10-CM

## 2020-10-09 DIAGNOSIS — E87.1 HYPONATREMIA: ICD-10-CM

## 2020-10-09 LAB
ALBUMIN SERPL BCP-MCNC: 3.7 G/DL (ref 3.5–5)
ALP SERPL-CCNC: 75 U/L (ref 46–116)
ALT SERPL W P-5'-P-CCNC: 47 U/L (ref 12–78)
ANION GAP SERPL CALCULATED.3IONS-SCNC: 9 MMOL/L (ref 4–13)
APAP SERPL-MCNC: <2 UG/ML (ref 10–20)
ARTERIAL PATENCY WRIST A: YES
AST SERPL W P-5'-P-CCNC: 84 U/L (ref 5–45)
BASE EX.OXY STD BLDV CALC-SCNC: 76.7 % (ref 60–80)
BASE EXCESS BLDA CALC-SCNC: -5 MMOL/L
BASE EXCESS BLDV CALC-SCNC: 0.1 MMOL/L
BASOPHILS # BLD AUTO: 0.09 THOUSANDS/ΜL (ref 0–0.1)
BASOPHILS NFR BLD AUTO: 2 % (ref 0–1)
BILIRUB SERPL-MCNC: 1.1 MG/DL (ref 0.2–1)
BUN SERPL-MCNC: 10 MG/DL (ref 5–25)
CALCIUM SERPL-MCNC: 8.7 MG/DL (ref 8.3–10.1)
CHLORIDE SERPL-SCNC: 98 MMOL/L (ref 100–108)
CO2 SERPL-SCNC: 26 MMOL/L (ref 21–32)
CREAT SERPL-MCNC: 0.78 MG/DL (ref 0.6–1.3)
EOSINOPHIL # BLD AUTO: 0.07 THOUSAND/ΜL (ref 0–0.61)
EOSINOPHIL NFR BLD AUTO: 1 % (ref 0–6)
ERYTHROCYTE [DISTWIDTH] IN BLOOD BY AUTOMATED COUNT: 15.7 % (ref 11.6–15.1)
ETHANOL SERPL-MCNC: 480 MG/DL (ref 0–3)
GFR SERPL CREATININE-BSD FRML MDRD: 99 ML/MIN/1.73SQ M
GLUCOSE SERPL-MCNC: 120 MG/DL (ref 65–140)
HCO3 BLDA-SCNC: 22 MMOL/L (ref 22–28)
HCO3 BLDV-SCNC: 25 MMOL/L (ref 24–30)
HCT VFR BLD AUTO: 42.1 % (ref 36.5–49.3)
HGB BLD-MCNC: 13.9 G/DL (ref 12–17)
HOROWITZ INDEX BLDA+IHG-RTO: 60 MM[HG]
IMM GRANULOCYTES # BLD AUTO: 0.03 THOUSAND/UL (ref 0–0.2)
IMM GRANULOCYTES NFR BLD AUTO: 1 % (ref 0–2)
INR PPP: 1.08 (ref 0.84–1.19)
LYMPHOCYTES # BLD AUTO: 1.43 THOUSANDS/ΜL (ref 0.6–4.47)
LYMPHOCYTES NFR BLD AUTO: 24 % (ref 14–44)
MAGNESIUM SERPL-MCNC: 1.9 MG/DL (ref 1.6–2.6)
MCH RBC QN AUTO: 29.6 PG (ref 26.8–34.3)
MCHC RBC AUTO-ENTMCNC: 33 G/DL (ref 31.4–37.4)
MCV RBC AUTO: 90 FL (ref 82–98)
MONOCYTES # BLD AUTO: 0.66 THOUSAND/ΜL (ref 0.17–1.22)
MONOCYTES NFR BLD AUTO: 11 % (ref 4–12)
NEUTROPHILS # BLD AUTO: 3.75 THOUSANDS/ΜL (ref 1.85–7.62)
NEUTS SEG NFR BLD AUTO: 61 % (ref 43–75)
NRBC BLD AUTO-RTO: 0 /100 WBCS
O2 CT BLDA-SCNC: 19.6 ML/DL (ref 16–23)
O2 CT BLDV-SCNC: 16.3 ML/DL
OXYHGB MFR BLDA: 95.6 % (ref 94–97)
PCO2 BLDA: 48.3 MM HG (ref 36–44)
PCO2 BLDV: 41.4 MM HG (ref 42–50)
PEEP RESPIRATORY: 8 CM[H2O]
PH BLDA: 7.28 [PH] (ref 7.35–7.45)
PH BLDV: 7.4 [PH] (ref 7.3–7.4)
PLATELET # BLD AUTO: 145 THOUSANDS/UL (ref 149–390)
PMV BLD AUTO: 9.3 FL (ref 8.9–12.7)
PO2 BLDA: 163.9 MM HG (ref 75–129)
PO2 BLDV: 51.2 MM HG (ref 35–45)
POTASSIUM SERPL-SCNC: 4.1 MMOL/L (ref 3.5–5.3)
PROT SERPL-MCNC: 7.9 G/DL (ref 6.4–8.2)
PROTHROMBIN TIME: 13.8 SECONDS (ref 11.6–14.5)
RBC # BLD AUTO: 4.7 MILLION/UL (ref 3.88–5.62)
SALICYLATES SERPL-MCNC: <3 MG/DL (ref 3–20)
SARS-COV-2 RNA RESP QL NAA+PROBE: NEGATIVE
SODIUM SERPL-SCNC: 133 MMOL/L (ref 136–145)
SPECIMEN SOURCE: ABNORMAL
TROPONIN I SERPL-MCNC: <0.02 NG/ML
VENT AC: 14
VENT- AC: AC
VT SETTING VENT: 500 ML
WBC # BLD AUTO: 6.03 THOUSAND/UL (ref 4.31–10.16)

## 2020-10-09 PROCEDURE — 80053 COMPREHEN METABOLIC PANEL: CPT | Performed by: EMERGENCY MEDICINE

## 2020-10-09 PROCEDURE — 82805 BLOOD GASES W/O2 SATURATION: CPT | Performed by: EMERGENCY MEDICINE

## 2020-10-09 PROCEDURE — 0BH17EZ INSERTION OF ENDOTRACHEAL AIRWAY INTO TRACHEA, VIA NATURAL OR ARTIFICIAL OPENING: ICD-10-PCS | Performed by: EMERGENCY MEDICINE

## 2020-10-09 PROCEDURE — 36415 COLL VENOUS BLD VENIPUNCTURE: CPT | Performed by: EMERGENCY MEDICINE

## 2020-10-09 PROCEDURE — 87635 SARS-COV-2 COVID-19 AMP PRB: CPT | Performed by: EMERGENCY MEDICINE

## 2020-10-09 PROCEDURE — 74177 CT ABD & PELVIS W/CONTRAST: CPT

## 2020-10-09 PROCEDURE — 85610 PROTHROMBIN TIME: CPT | Performed by: EMERGENCY MEDICINE

## 2020-10-09 PROCEDURE — 71045 X-RAY EXAM CHEST 1 VIEW: CPT

## 2020-10-09 PROCEDURE — 94760 N-INVAS EAR/PLS OXIMETRY 1: CPT

## 2020-10-09 PROCEDURE — 96372 THER/PROPH/DIAG INJ SC/IM: CPT

## 2020-10-09 PROCEDURE — 36600 WITHDRAWAL OF ARTERIAL BLOOD: CPT

## 2020-10-09 PROCEDURE — 93005 ELECTROCARDIOGRAM TRACING: CPT

## 2020-10-09 PROCEDURE — 85025 COMPLETE CBC W/AUTO DIFF WBC: CPT | Performed by: EMERGENCY MEDICINE

## 2020-10-09 PROCEDURE — 99291 CRITICAL CARE FIRST HOUR: CPT | Performed by: EMERGENCY MEDICINE

## 2020-10-09 PROCEDURE — 94002 VENT MGMT INPAT INIT DAY: CPT

## 2020-10-09 PROCEDURE — 70450 CT HEAD/BRAIN W/O DYE: CPT

## 2020-10-09 PROCEDURE — 80329 ANALGESICS NON-OPIOID 1 OR 2: CPT | Performed by: EMERGENCY MEDICINE

## 2020-10-09 PROCEDURE — 96375 TX/PRO/DX INJ NEW DRUG ADDON: CPT

## 2020-10-09 PROCEDURE — 96361 HYDRATE IV INFUSION ADD-ON: CPT

## 2020-10-09 PROCEDURE — G1004 CDSM NDSC: HCPCS

## 2020-10-09 PROCEDURE — 96374 THER/PROPH/DIAG INJ IV PUSH: CPT

## 2020-10-09 PROCEDURE — 80320 DRUG SCREEN QUANTALCOHOLS: CPT | Performed by: EMERGENCY MEDICINE

## 2020-10-09 PROCEDURE — 31500 INSERT EMERGENCY AIRWAY: CPT | Performed by: EMERGENCY MEDICINE

## 2020-10-09 PROCEDURE — 71260 CT THORAX DX C+: CPT

## 2020-10-09 PROCEDURE — 84484 ASSAY OF TROPONIN QUANT: CPT | Performed by: EMERGENCY MEDICINE

## 2020-10-09 PROCEDURE — 99285 EMERGENCY DEPT VISIT HI MDM: CPT

## 2020-10-09 PROCEDURE — 83735 ASSAY OF MAGNESIUM: CPT | Performed by: EMERGENCY MEDICINE

## 2020-10-09 PROCEDURE — 99292 CRITICAL CARE ADDL 30 MIN: CPT | Performed by: EMERGENCY MEDICINE

## 2020-10-09 PROCEDURE — C9113 INJ PANTOPRAZOLE SODIUM, VIA: HCPCS | Performed by: EMERGENCY MEDICINE

## 2020-10-09 PROCEDURE — 83935 ASSAY OF URINE OSMOLALITY: CPT | Performed by: PHYSICIAN ASSISTANT

## 2020-10-09 PROCEDURE — 84300 ASSAY OF URINE SODIUM: CPT | Performed by: PHYSICIAN ASSISTANT

## 2020-10-09 PROCEDURE — 5A1945Z RESPIRATORY VENTILATION, 24-96 CONSECUTIVE HOURS: ICD-10-PCS | Performed by: EMERGENCY MEDICINE

## 2020-10-09 PROCEDURE — 99291 CRITICAL CARE FIRST HOUR: CPT | Performed by: PHYSICIAN ASSISTANT

## 2020-10-09 RX ORDER — ETOMIDATE 2 MG/ML
27 INJECTION INTRAVENOUS ONCE
Status: COMPLETED | OUTPATIENT
Start: 2020-10-09 | End: 2020-10-09

## 2020-10-09 RX ORDER — VECURONIUM BROMIDE 1 MG/ML
9 INJECTION, POWDER, LYOPHILIZED, FOR SOLUTION INTRAVENOUS ONCE
Status: COMPLETED | OUTPATIENT
Start: 2020-10-09 | End: 2020-10-09

## 2020-10-09 RX ORDER — DROPERIDOL 2.5 MG/ML
1.25 INJECTION, SOLUTION INTRAMUSCULAR; INTRAVENOUS ONCE
Status: DISCONTINUED | OUTPATIENT
Start: 2020-10-09 | End: 2020-10-09

## 2020-10-09 RX ORDER — PROPOFOL 10 MG/ML
5-50 INJECTION, EMULSION INTRAVENOUS
Status: DISCONTINUED | OUTPATIENT
Start: 2020-10-09 | End: 2020-10-11

## 2020-10-09 RX ORDER — NICOTINE 21 MG/24HR
1 PATCH, TRANSDERMAL 24 HOURS TRANSDERMAL DAILY
Status: DISCONTINUED | OUTPATIENT
Start: 2020-10-10 | End: 2020-10-25 | Stop reason: HOSPADM

## 2020-10-09 RX ORDER — ROCURONIUM BROMIDE 10 MG/ML
1 INJECTION, SOLUTION INTRAVENOUS ONCE
Status: COMPLETED | OUTPATIENT
Start: 2020-10-09 | End: 2020-10-09

## 2020-10-09 RX ORDER — LORAZEPAM 2 MG/ML
2 INJECTION INTRAMUSCULAR ONCE
Status: COMPLETED | OUTPATIENT
Start: 2020-10-09 | End: 2020-10-09

## 2020-10-09 RX ORDER — PANTOPRAZOLE SODIUM 40 MG/1
40 INJECTION, POWDER, FOR SOLUTION INTRAVENOUS ONCE
Status: COMPLETED | OUTPATIENT
Start: 2020-10-09 | End: 2020-10-09

## 2020-10-09 RX ORDER — PROPOFOL 10 MG/ML
50 INJECTION, EMULSION INTRAVENOUS ONCE
Status: COMPLETED | OUTPATIENT
Start: 2020-10-09 | End: 2020-10-09

## 2020-10-09 RX ORDER — DROPERIDOL 2.5 MG/ML
2.5 INJECTION, SOLUTION INTRAMUSCULAR; INTRAVENOUS ONCE
Status: DISCONTINUED | OUTPATIENT
Start: 2020-10-09 | End: 2020-10-09

## 2020-10-09 RX ORDER — ONDANSETRON 2 MG/ML
4 INJECTION INTRAMUSCULAR; INTRAVENOUS ONCE
Status: COMPLETED | OUTPATIENT
Start: 2020-10-09 | End: 2020-10-09

## 2020-10-09 RX ORDER — HALOPERIDOL 5 MG/ML
5 INJECTION INTRAMUSCULAR ONCE
Status: COMPLETED | OUTPATIENT
Start: 2020-10-09 | End: 2020-10-09

## 2020-10-09 RX ORDER — FOLIC ACID 5 MG/ML
1 INJECTION, SOLUTION INTRAMUSCULAR; INTRAVENOUS; SUBCUTANEOUS DAILY
Status: DISCONTINUED | OUTPATIENT
Start: 2020-10-10 | End: 2020-10-09 | Stop reason: SDUPTHER

## 2020-10-09 RX ORDER — CHLORHEXIDINE GLUCONATE 0.12 MG/ML
15 RINSE ORAL EVERY 12 HOURS SCHEDULED
Status: DISCONTINUED | OUTPATIENT
Start: 2020-10-09 | End: 2020-10-11

## 2020-10-09 RX ORDER — OLANZAPINE 10 MG/1
10 INJECTION, POWDER, LYOPHILIZED, FOR SOLUTION INTRAMUSCULAR ONCE
Status: COMPLETED | OUTPATIENT
Start: 2020-10-09 | End: 2020-10-09

## 2020-10-09 RX ADMIN — IOHEXOL 100 ML: 350 INJECTION, SOLUTION INTRAVENOUS at 19:08

## 2020-10-09 RX ADMIN — HALOPERIDOL LACTATE 5 MG: 5 INJECTION, SOLUTION INTRAMUSCULAR at 17:56

## 2020-10-09 RX ADMIN — PROPOFOL 50 MG: 10 INJECTION, EMULSION INTRAVENOUS at 18:50

## 2020-10-09 RX ADMIN — VECURONIUM BROMIDE 9 MG: 1 INJECTION, POWDER, LYOPHILIZED, FOR SOLUTION INTRAVENOUS at 18:39

## 2020-10-09 RX ADMIN — ROCURONIUM BROMIDE 87 MG: 10 INJECTION, SOLUTION INTRAVENOUS at 19:36

## 2020-10-09 RX ADMIN — CHLORHEXIDINE GLUCONATE 0.12% ORAL RINSE 15 ML: 1.2 LIQUID ORAL at 22:21

## 2020-10-09 RX ADMIN — LORAZEPAM 2 MG: 2 INJECTION INTRAMUSCULAR; INTRAVENOUS at 21:53

## 2020-10-09 RX ADMIN — PANTOPRAZOLE SODIUM 40 MG: 40 INJECTION, POWDER, FOR SOLUTION INTRAVENOUS at 17:18

## 2020-10-09 RX ADMIN — SODIUM CHLORIDE 1000 ML: 0.9 INJECTION, SOLUTION INTRAVENOUS at 20:02

## 2020-10-09 RX ADMIN — SODIUM CHLORIDE 1000 ML: 0.9 INJECTION, SOLUTION INTRAVENOUS at 18:36

## 2020-10-09 RX ADMIN — PROPOFOL 10 MCG/KG/MIN: 10 INJECTION, EMULSION INTRAVENOUS at 18:55

## 2020-10-09 RX ADMIN — OLANZAPINE 10 MG: 10 INJECTION, POWDER, FOR SOLUTION INTRAMUSCULAR at 17:00

## 2020-10-09 RX ADMIN — SODIUM CHLORIDE 1000 ML: 0.9 INJECTION, SOLUTION INTRAVENOUS at 17:59

## 2020-10-09 RX ADMIN — ETOMIDATE 27 MG: 2 INJECTION INTRAVENOUS at 18:38

## 2020-10-09 RX ADMIN — ONDANSETRON 4 MG: 2 INJECTION INTRAMUSCULAR; INTRAVENOUS at 17:16

## 2020-10-09 RX ADMIN — PROPOFOL 50 MCG/KG/MIN: 10 INJECTION, EMULSION INTRAVENOUS at 22:14

## 2020-10-10 PROBLEM — Z97.8 ENDOTRACHEALLY INTUBATED: Status: ACTIVE | Noted: 2020-10-10

## 2020-10-10 PROBLEM — G92.9 TOXIC ENCEPHALOPATHY: Status: ACTIVE | Noted: 2020-10-10

## 2020-10-10 LAB
ALBUMIN SERPL BCP-MCNC: 3.2 G/DL (ref 3.5–5)
ALP SERPL-CCNC: 64 U/L (ref 46–116)
ALT SERPL W P-5'-P-CCNC: 40 U/L (ref 12–78)
ANION GAP SERPL CALCULATED.3IONS-SCNC: 11 MMOL/L (ref 4–13)
AST SERPL W P-5'-P-CCNC: 74 U/L (ref 5–45)
BILIRUB SERPL-MCNC: 0.9 MG/DL (ref 0.2–1)
BUN SERPL-MCNC: 9 MG/DL (ref 5–25)
CALCIUM ALBUM COR SERPL-MCNC: 8.2 MG/DL (ref 8.3–10.1)
CALCIUM SERPL-MCNC: 7.6 MG/DL (ref 8.3–10.1)
CHLORIDE SERPL-SCNC: 106 MMOL/L (ref 100–108)
CO2 SERPL-SCNC: 22 MMOL/L (ref 21–32)
CREAT SERPL-MCNC: 0.91 MG/DL (ref 0.6–1.3)
ERYTHROCYTE [DISTWIDTH] IN BLOOD BY AUTOMATED COUNT: 16 % (ref 11.6–15.1)
ETHANOL SERPL-MCNC: 241 MG/DL (ref 0–3)
GFR SERPL CREATININE-BSD FRML MDRD: 93 ML/MIN/1.73SQ M
GLUCOSE SERPL-MCNC: 85 MG/DL (ref 65–140)
GLUCOSE SERPL-MCNC: 87 MG/DL (ref 65–140)
GLUCOSE SERPL-MCNC: 95 MG/DL (ref 65–140)
HCT VFR BLD AUTO: 39.1 % (ref 36.5–49.3)
HGB BLD-MCNC: 12.6 G/DL (ref 12–17)
MAGNESIUM SERPL-MCNC: 1.9 MG/DL (ref 1.6–2.6)
MCH RBC QN AUTO: 29.6 PG (ref 26.8–34.3)
MCHC RBC AUTO-ENTMCNC: 32.2 G/DL (ref 31.4–37.4)
MCV RBC AUTO: 92 FL (ref 82–98)
OSMOLALITY UR: 241 MMOL/KG
PHOSPHATE SERPL-MCNC: 4.4 MG/DL (ref 2.7–4.5)
PLATELET # BLD AUTO: 117 THOUSANDS/UL (ref 149–390)
PMV BLD AUTO: 9.8 FL (ref 8.9–12.7)
POTASSIUM SERPL-SCNC: 4.3 MMOL/L (ref 3.5–5.3)
PROT SERPL-MCNC: 7.1 G/DL (ref 6.4–8.2)
RBC # BLD AUTO: 4.25 MILLION/UL (ref 3.88–5.62)
SODIUM 24H UR-SCNC: 37 MOL/L
SODIUM SERPL-SCNC: 139 MMOL/L (ref 136–145)
WBC # BLD AUTO: 6.29 THOUSAND/UL (ref 4.31–10.16)

## 2020-10-10 PROCEDURE — 82948 REAGENT STRIP/BLOOD GLUCOSE: CPT

## 2020-10-10 PROCEDURE — 99291 CRITICAL CARE FIRST HOUR: CPT | Performed by: FAMILY MEDICINE

## 2020-10-10 PROCEDURE — 94760 N-INVAS EAR/PLS OXIMETRY 1: CPT

## 2020-10-10 PROCEDURE — 80320 DRUG SCREEN QUANTALCOHOLS: CPT | Performed by: PHYSICIAN ASSISTANT

## 2020-10-10 PROCEDURE — 94003 VENT MGMT INPAT SUBQ DAY: CPT

## 2020-10-10 PROCEDURE — 80053 COMPREHEN METABOLIC PANEL: CPT | Performed by: PHYSICIAN ASSISTANT

## 2020-10-10 PROCEDURE — 83735 ASSAY OF MAGNESIUM: CPT | Performed by: PHYSICIAN ASSISTANT

## 2020-10-10 PROCEDURE — 84100 ASSAY OF PHOSPHORUS: CPT | Performed by: PHYSICIAN ASSISTANT

## 2020-10-10 PROCEDURE — 85027 COMPLETE CBC AUTOMATED: CPT | Performed by: PHYSICIAN ASSISTANT

## 2020-10-10 RX ORDER — LORAZEPAM 2 MG/ML
1 INJECTION INTRAMUSCULAR EVERY 4 HOURS PRN
Status: DISCONTINUED | OUTPATIENT
Start: 2020-10-10 | End: 2020-10-20

## 2020-10-10 RX ORDER — LORAZEPAM 2 MG/ML
2 INJECTION INTRAMUSCULAR ONCE
Status: COMPLETED | OUTPATIENT
Start: 2020-10-10 | End: 2020-10-10

## 2020-10-10 RX ORDER — SODIUM CHLORIDE 9 MG/ML
125 INJECTION, SOLUTION INTRAVENOUS CONTINUOUS
Status: DISCONTINUED | OUTPATIENT
Start: 2020-10-10 | End: 2020-10-11

## 2020-10-10 RX ADMIN — SODIUM CHLORIDE 125 ML/HR: 0.9 INJECTION, SOLUTION INTRAVENOUS at 23:18

## 2020-10-10 RX ADMIN — CHLORHEXIDINE GLUCONATE 0.12% ORAL RINSE 15 ML: 1.2 LIQUID ORAL at 08:15

## 2020-10-10 RX ADMIN — SODIUM CHLORIDE 125 ML/HR: 0.9 INJECTION, SOLUTION INTRAVENOUS at 07:38

## 2020-10-10 RX ADMIN — DEXMEDETOMIDINE HYDROCHLORIDE 0.2 MCG/KG/HR: 100 INJECTION, SOLUTION, CONCENTRATE INTRAVENOUS at 10:55

## 2020-10-10 RX ADMIN — NICOTINE 1 PATCH: 14 PATCH TRANSDERMAL at 08:28

## 2020-10-10 RX ADMIN — PROPOFOL 50 MCG/KG/MIN: 10 INJECTION, EMULSION INTRAVENOUS at 09:15

## 2020-10-10 RX ADMIN — SODIUM CHLORIDE 125 ML/HR: 0.9 INJECTION, SOLUTION INTRAVENOUS at 15:18

## 2020-10-10 RX ADMIN — LORAZEPAM 1 MG: 2 INJECTION INTRAMUSCULAR; INTRAVENOUS at 15:37

## 2020-10-10 RX ADMIN — PROPOFOL 50 MCG/KG/MIN: 10 INJECTION, EMULSION INTRAVENOUS at 01:05

## 2020-10-10 RX ADMIN — LORAZEPAM 1 MG: 2 INJECTION INTRAMUSCULAR; INTRAVENOUS at 20:35

## 2020-10-10 RX ADMIN — DEXMEDETOMIDINE HYDROCHLORIDE 0.7 MCG/KG/HR: 100 INJECTION, SOLUTION, CONCENTRATE INTRAVENOUS at 19:35

## 2020-10-10 RX ADMIN — FOLIC ACID: 5 INJECTION, SOLUTION INTRAMUSCULAR; INTRAVENOUS; SUBCUTANEOUS at 11:35

## 2020-10-10 RX ADMIN — PROPOFOL 10 MCG/KG/MIN: 10 INJECTION, EMULSION INTRAVENOUS at 21:38

## 2020-10-10 RX ADMIN — SODIUM CHLORIDE 500 ML: 0.9 INJECTION, SOLUTION INTRAVENOUS at 09:37

## 2020-10-10 RX ADMIN — SODIUM CHLORIDE 1000 ML: 0.9 INJECTION, SOLUTION INTRAVENOUS at 13:42

## 2020-10-10 RX ADMIN — PROPOFOL 35 MCG/KG/MIN: 10 INJECTION, EMULSION INTRAVENOUS at 13:13

## 2020-10-10 RX ADMIN — CHLORHEXIDINE GLUCONATE 0.12% ORAL RINSE 15 ML: 1.2 LIQUID ORAL at 20:02

## 2020-10-10 RX ADMIN — LORAZEPAM 1 MG: 2 INJECTION INTRAMUSCULAR; INTRAVENOUS at 10:16

## 2020-10-10 RX ADMIN — PROPOFOL 45 MCG/KG/MIN: 10 INJECTION, EMULSION INTRAVENOUS at 05:15

## 2020-10-10 RX ADMIN — LORAZEPAM 2 MG: 2 INJECTION INTRAMUSCULAR; INTRAVENOUS at 03:00

## 2020-10-11 ENCOUNTER — APPOINTMENT (INPATIENT)
Dept: RADIOLOGY | Facility: HOSPITAL | Age: 60
DRG: 896 | End: 2020-10-11
Payer: COMMERCIAL

## 2020-10-11 LAB
ALBUMIN SERPL BCP-MCNC: 2.8 G/DL (ref 3.5–5)
ALP SERPL-CCNC: 57 U/L (ref 46–116)
ALT SERPL W P-5'-P-CCNC: 42 U/L (ref 12–78)
ANION GAP SERPL CALCULATED.3IONS-SCNC: 11 MMOL/L (ref 4–13)
AST SERPL W P-5'-P-CCNC: 80 U/L (ref 5–45)
BASOPHILS # BLD AUTO: 0.02 THOUSANDS/ΜL (ref 0–0.1)
BASOPHILS NFR BLD AUTO: 1 % (ref 0–1)
BILIRUB SERPL-MCNC: 1.7 MG/DL (ref 0.2–1)
BUN SERPL-MCNC: 17 MG/DL (ref 5–25)
CALCIUM ALBUM COR SERPL-MCNC: 8.5 MG/DL (ref 8.3–10.1)
CALCIUM SERPL-MCNC: 7.5 MG/DL (ref 8.3–10.1)
CHLORIDE SERPL-SCNC: 109 MMOL/L (ref 100–108)
CO2 SERPL-SCNC: 22 MMOL/L (ref 21–32)
CREAT SERPL-MCNC: 0.8 MG/DL (ref 0.6–1.3)
EOSINOPHIL # BLD AUTO: 0.06 THOUSAND/ΜL (ref 0–0.61)
EOSINOPHIL NFR BLD AUTO: 2 % (ref 0–6)
ERYTHROCYTE [DISTWIDTH] IN BLOOD BY AUTOMATED COUNT: 15.5 % (ref 11.6–15.1)
GFR SERPL CREATININE-BSD FRML MDRD: 98 ML/MIN/1.73SQ M
GLUCOSE SERPL-MCNC: 103 MG/DL (ref 65–140)
GLUCOSE SERPL-MCNC: 103 MG/DL (ref 65–140)
HCT VFR BLD AUTO: 36 % (ref 36.5–49.3)
HGB BLD-MCNC: 11.5 G/DL (ref 12–17)
IMM GRANULOCYTES # BLD AUTO: 0.02 THOUSAND/UL (ref 0–0.2)
IMM GRANULOCYTES NFR BLD AUTO: 1 % (ref 0–2)
INR PPP: 1.2 (ref 0.84–1.19)
LYMPHOCYTES # BLD AUTO: 0.63 THOUSANDS/ΜL (ref 0.6–4.47)
LYMPHOCYTES NFR BLD AUTO: 18 % (ref 14–44)
MAGNESIUM SERPL-MCNC: 1.8 MG/DL (ref 1.6–2.6)
MCH RBC QN AUTO: 30.3 PG (ref 26.8–34.3)
MCHC RBC AUTO-ENTMCNC: 31.9 G/DL (ref 31.4–37.4)
MCV RBC AUTO: 95 FL (ref 82–98)
MONOCYTES # BLD AUTO: 0.44 THOUSAND/ΜL (ref 0.17–1.22)
MONOCYTES NFR BLD AUTO: 12 % (ref 4–12)
NEUTROPHILS # BLD AUTO: 2.4 THOUSANDS/ΜL (ref 1.85–7.62)
NEUTS SEG NFR BLD AUTO: 66 % (ref 43–75)
NRBC BLD AUTO-RTO: 0 /100 WBCS
PLATELET # BLD AUTO: 54 THOUSANDS/UL (ref 149–390)
PMV BLD AUTO: 9.6 FL (ref 8.9–12.7)
POTASSIUM SERPL-SCNC: 4.1 MMOL/L (ref 3.5–5.3)
PROT SERPL-MCNC: 6.6 G/DL (ref 6.4–8.2)
PROTHROMBIN TIME: 15 SECONDS (ref 11.6–14.5)
RBC # BLD AUTO: 3.8 MILLION/UL (ref 3.88–5.62)
SODIUM SERPL-SCNC: 142 MMOL/L (ref 136–145)
WBC # BLD AUTO: 3.57 THOUSAND/UL (ref 4.31–10.16)

## 2020-10-11 PROCEDURE — 80074 ACUTE HEPATITIS PANEL: CPT | Performed by: FAMILY MEDICINE

## 2020-10-11 PROCEDURE — 85610 PROTHROMBIN TIME: CPT | Performed by: FAMILY MEDICINE

## 2020-10-11 PROCEDURE — 73610 X-RAY EXAM OF ANKLE: CPT

## 2020-10-11 PROCEDURE — 94150 VITAL CAPACITY TEST: CPT

## 2020-10-11 PROCEDURE — 94760 N-INVAS EAR/PLS OXIMETRY 1: CPT

## 2020-10-11 PROCEDURE — 80053 COMPREHEN METABOLIC PANEL: CPT | Performed by: FAMILY MEDICINE

## 2020-10-11 PROCEDURE — 82948 REAGENT STRIP/BLOOD GLUCOSE: CPT

## 2020-10-11 PROCEDURE — 99291 CRITICAL CARE FIRST HOUR: CPT | Performed by: FAMILY MEDICINE

## 2020-10-11 PROCEDURE — 83735 ASSAY OF MAGNESIUM: CPT | Performed by: FAMILY MEDICINE

## 2020-10-11 PROCEDURE — 94003 VENT MGMT INPAT SUBQ DAY: CPT

## 2020-10-11 PROCEDURE — 85025 COMPLETE CBC W/AUTO DIFF WBC: CPT | Performed by: FAMILY MEDICINE

## 2020-10-11 RX ORDER — LORAZEPAM 2 MG/ML
4 INJECTION INTRAMUSCULAR ONCE
Status: COMPLETED | OUTPATIENT
Start: 2020-10-11 | End: 2020-10-11

## 2020-10-11 RX ORDER — THIAMINE MONONITRATE (VIT B1) 100 MG
100 TABLET ORAL DAILY
Status: DISCONTINUED | OUTPATIENT
Start: 2020-10-11 | End: 2020-10-13

## 2020-10-11 RX ORDER — FOLIC ACID 1 MG/1
1 TABLET ORAL DAILY
Status: DISCONTINUED | OUTPATIENT
Start: 2020-10-11 | End: 2020-10-13

## 2020-10-11 RX ORDER — FONDAPARINUX SODIUM 2.5 MG/.5ML
2.5 INJECTION SUBCUTANEOUS EVERY 24 HOURS
Status: DISCONTINUED | OUTPATIENT
Start: 2020-10-11 | End: 2020-10-25 | Stop reason: HOSPADM

## 2020-10-11 RX ORDER — PHENOBARBITAL SODIUM 130 MG/ML
130 INJECTION INTRAMUSCULAR ONCE
Status: DISCONTINUED | OUTPATIENT
Start: 2020-10-11 | End: 2020-10-11

## 2020-10-11 RX ADMIN — Medication 260 MG: at 23:39

## 2020-10-11 RX ADMIN — THIAMINE HCL TAB 100 MG 100 MG: 100 TAB at 18:18

## 2020-10-11 RX ADMIN — FONDAPARINUX SODIUM 2.5 MG: 2.5 INJECTION, SOLUTION SUBCUTANEOUS at 11:13

## 2020-10-11 RX ADMIN — LORAZEPAM 4 MG: 2 INJECTION INTRAMUSCULAR; INTRAVENOUS at 10:51

## 2020-10-11 RX ADMIN — Medication 200 MG: at 23:48

## 2020-10-11 RX ADMIN — Medication 650 MG: at 22:20

## 2020-10-11 RX ADMIN — PHENOBARBITAL SODIUM 260 MG: 130 INJECTION INTRAMUSCULAR; INTRAVENOUS at 18:44

## 2020-10-11 RX ADMIN — MULTIPLE VITAMINS W/ MINERALS TAB 1 TABLET: TAB at 11:13

## 2020-10-11 RX ADMIN — PHENOBARBITAL SODIUM 260 MG: 130 INJECTION INTRAMUSCULAR; INTRAVENOUS at 17:30

## 2020-10-11 RX ADMIN — SODIUM CHLORIDE 125 ML/HR: 0.9 INJECTION, SOLUTION INTRAVENOUS at 15:12

## 2020-10-11 RX ADMIN — DEXMEDETOMIDINE HYDROCHLORIDE 0.7 MCG/KG/HR: 100 INJECTION, SOLUTION, CONCENTRATE INTRAVENOUS at 03:05

## 2020-10-11 RX ADMIN — FOLIC ACID: 5 INJECTION, SOLUTION INTRAMUSCULAR; INTRAVENOUS; SUBCUTANEOUS at 08:19

## 2020-10-11 RX ADMIN — PHENOBARBITAL SODIUM 260 MG: 130 INJECTION INTRAMUSCULAR; INTRAVENOUS at 16:28

## 2020-10-11 RX ADMIN — SODIUM CHLORIDE 125 ML/HR: 0.9 INJECTION, SOLUTION INTRAVENOUS at 07:07

## 2020-10-11 RX ADMIN — FOLIC ACID 1 MG: 1 TABLET ORAL at 18:18

## 2020-10-11 RX ADMIN — LORAZEPAM 4 MG: 2 INJECTION INTRAMUSCULAR; INTRAVENOUS at 15:28

## 2020-10-11 RX ADMIN — LORAZEPAM 4 MG: 2 INJECTION INTRAMUSCULAR; INTRAVENOUS at 19:44

## 2020-10-11 RX ADMIN — CHLORHEXIDINE GLUCONATE 0.12% ORAL RINSE 15 ML: 1.2 LIQUID ORAL at 08:15

## 2020-10-11 RX ADMIN — NICOTINE 1 PATCH: 14 PATCH TRANSDERMAL at 08:15

## 2020-10-11 RX ADMIN — PROPOFOL 30 MCG/KG/MIN: 10 INJECTION, EMULSION INTRAVENOUS at 04:55

## 2020-10-11 RX ADMIN — LORAZEPAM 4 MG: 2 INJECTION INTRAMUSCULAR; INTRAVENOUS at 20:18

## 2020-10-11 RX ADMIN — LORAZEPAM 1 MG: 2 INJECTION INTRAMUSCULAR; INTRAVENOUS at 02:04

## 2020-10-12 ENCOUNTER — APPOINTMENT (INPATIENT)
Dept: NON INVASIVE DIAGNOSTICS | Facility: HOSPITAL | Age: 60
DRG: 896 | End: 2020-10-12
Payer: COMMERCIAL

## 2020-10-12 ENCOUNTER — APPOINTMENT (INPATIENT)
Dept: CT IMAGING | Facility: HOSPITAL | Age: 60
DRG: 896 | End: 2020-10-12
Payer: COMMERCIAL

## 2020-10-12 LAB
ALBUMIN SERPL BCP-MCNC: 2.8 G/DL (ref 3.5–5)
ALBUMIN SERPL BCP-MCNC: 3 G/DL (ref 3.5–5)
ALP SERPL-CCNC: 58 U/L (ref 46–116)
ALP SERPL-CCNC: 61 U/L (ref 46–116)
ALT SERPL W P-5'-P-CCNC: 39 U/L (ref 12–78)
ALT SERPL W P-5'-P-CCNC: 39 U/L (ref 12–78)
AMMONIA PLAS-SCNC: 13 UMOL/L (ref 11–35)
ANION GAP SERPL CALCULATED.3IONS-SCNC: 6 MMOL/L (ref 4–13)
ANION GAP SERPL CALCULATED.3IONS-SCNC: 8 MMOL/L (ref 4–13)
AST SERPL W P-5'-P-CCNC: 69 U/L (ref 5–45)
AST SERPL W P-5'-P-CCNC: 75 U/L (ref 5–45)
ATRIAL RATE: 82 BPM
BASOPHILS # BLD AUTO: 0.02 THOUSANDS/ΜL (ref 0–0.1)
BASOPHILS NFR BLD AUTO: 1 % (ref 0–1)
BILIRUB SERPL-MCNC: 2.3 MG/DL (ref 0.2–1)
BILIRUB SERPL-MCNC: 2.4 MG/DL (ref 0.2–1)
BUN SERPL-MCNC: 7 MG/DL (ref 5–25)
BUN SERPL-MCNC: 8 MG/DL (ref 5–25)
CALCIUM ALBUM COR SERPL-MCNC: 8.4 MG/DL (ref 8.3–10.1)
CALCIUM ALBUM COR SERPL-MCNC: 8.5 MG/DL (ref 8.3–10.1)
CALCIUM SERPL-MCNC: 7.4 MG/DL (ref 8.3–10.1)
CALCIUM SERPL-MCNC: 7.7 MG/DL (ref 8.3–10.1)
CHLORIDE SERPL-SCNC: 106 MMOL/L (ref 100–108)
CHLORIDE SERPL-SCNC: 107 MMOL/L (ref 100–108)
CO2 SERPL-SCNC: 25 MMOL/L (ref 21–32)
CO2 SERPL-SCNC: 27 MMOL/L (ref 21–32)
CREAT SERPL-MCNC: 0.69 MG/DL (ref 0.6–1.3)
CREAT SERPL-MCNC: 0.75 MG/DL (ref 0.6–1.3)
D DIMER PPP FEU-MCNC: 2.27 UG/ML FEU
EOSINOPHIL # BLD AUTO: 0.05 THOUSAND/ΜL (ref 0–0.61)
EOSINOPHIL NFR BLD AUTO: 2 % (ref 0–6)
ERYTHROCYTE [DISTWIDTH] IN BLOOD BY AUTOMATED COUNT: 15.6 % (ref 11.6–15.1)
ERYTHROCYTE [DISTWIDTH] IN BLOOD BY AUTOMATED COUNT: 15.7 % (ref 11.6–15.1)
GFR SERPL CREATININE-BSD FRML MDRD: 101 ML/MIN/1.73SQ M
GFR SERPL CREATININE-BSD FRML MDRD: 105 ML/MIN/1.73SQ M
GLUCOSE SERPL-MCNC: 113 MG/DL (ref 65–140)
GLUCOSE SERPL-MCNC: 116 MG/DL (ref 65–140)
HAV IGM SER QL: ABNORMAL
HBV CORE IGM SER QL: ABNORMAL
HBV SURFACE AG SER QL: ABNORMAL
HCT VFR BLD AUTO: 31.1 % (ref 36.5–49.3)
HCT VFR BLD AUTO: 31.6 % (ref 36.5–49.3)
HCV AB SER QL: ABNORMAL
HGB BLD-MCNC: 10.2 G/DL (ref 12–17)
HGB BLD-MCNC: 10.3 G/DL (ref 12–17)
IMM GRANULOCYTES # BLD AUTO: 0.01 THOUSAND/UL (ref 0–0.2)
IMM GRANULOCYTES NFR BLD AUTO: 0 % (ref 0–2)
LYMPHOCYTES # BLD AUTO: 0.65 THOUSANDS/ΜL (ref 0.6–4.47)
LYMPHOCYTES NFR BLD AUTO: 21 % (ref 14–44)
MAGNESIUM SERPL-MCNC: 1.6 MG/DL (ref 1.6–2.6)
MAGNESIUM SERPL-MCNC: 1.7 MG/DL (ref 1.6–2.6)
MCH RBC QN AUTO: 30.4 PG (ref 26.8–34.3)
MCH RBC QN AUTO: 31.3 PG (ref 26.8–34.3)
MCHC RBC AUTO-ENTMCNC: 32.3 G/DL (ref 31.4–37.4)
MCHC RBC AUTO-ENTMCNC: 33.1 G/DL (ref 31.4–37.4)
MCV RBC AUTO: 94 FL (ref 82–98)
MCV RBC AUTO: 95 FL (ref 82–98)
MONOCYTES # BLD AUTO: 0.44 THOUSAND/ΜL (ref 0.17–1.22)
MONOCYTES NFR BLD AUTO: 14 % (ref 4–12)
NEUTROPHILS # BLD AUTO: 1.93 THOUSANDS/ΜL (ref 1.85–7.62)
NEUTS SEG NFR BLD AUTO: 62 % (ref 43–75)
NRBC BLD AUTO-RTO: 0 /100 WBCS
P AXIS: 10 DEGREES
PLATELET # BLD AUTO: 36 THOUSANDS/UL (ref 149–390)
PLATELET # BLD AUTO: 41 THOUSANDS/UL (ref 149–390)
PMV BLD AUTO: 9.2 FL (ref 8.9–12.7)
PMV BLD AUTO: 9.6 FL (ref 8.9–12.7)
POTASSIUM SERPL-SCNC: 3 MMOL/L (ref 3.5–5.3)
POTASSIUM SERPL-SCNC: 3.2 MMOL/L (ref 3.5–5.3)
PR INTERVAL: 168 MS
PROCALCITONIN SERPL-MCNC: <0.05 NG/ML
PROT SERPL-MCNC: 6.3 G/DL (ref 6.4–8.2)
PROT SERPL-MCNC: 6.6 G/DL (ref 6.4–8.2)
QRS AXIS: -20 DEGREES
QRSD INTERVAL: 106 MS
QT INTERVAL: 400 MS
QTC INTERVAL: 467 MS
RBC # BLD AUTO: 3.29 MILLION/UL (ref 3.88–5.62)
RBC # BLD AUTO: 3.36 MILLION/UL (ref 3.88–5.62)
SODIUM SERPL-SCNC: 139 MMOL/L (ref 136–145)
SODIUM SERPL-SCNC: 140 MMOL/L (ref 136–145)
T WAVE AXIS: 22 DEGREES
TROPONIN I SERPL-MCNC: <0.02 NG/ML
VENTRICULAR RATE: 82 BPM
WBC # BLD AUTO: 3.1 THOUSAND/UL (ref 4.31–10.16)
WBC # BLD AUTO: 4.3 THOUSAND/UL (ref 4.31–10.16)

## 2020-10-12 PROCEDURE — 80053 COMPREHEN METABOLIC PANEL: CPT | Performed by: PHYSICIAN ASSISTANT

## 2020-10-12 PROCEDURE — G0508 CRIT CARE TELEHEA CONSULT 60: HCPCS | Performed by: EMERGENCY MEDICINE

## 2020-10-12 PROCEDURE — 82140 ASSAY OF AMMONIA: CPT | Performed by: PHYSICIAN ASSISTANT

## 2020-10-12 PROCEDURE — 83735 ASSAY OF MAGNESIUM: CPT | Performed by: FAMILY MEDICINE

## 2020-10-12 PROCEDURE — G1004 CDSM NDSC: HCPCS

## 2020-10-12 PROCEDURE — 93970 EXTREMITY STUDY: CPT | Performed by: SURGERY

## 2020-10-12 PROCEDURE — 93010 ELECTROCARDIOGRAM REPORT: CPT | Performed by: INTERNAL MEDICINE

## 2020-10-12 PROCEDURE — 85025 COMPLETE CBC W/AUTO DIFF WBC: CPT | Performed by: FAMILY MEDICINE

## 2020-10-12 PROCEDURE — 80053 COMPREHEN METABOLIC PANEL: CPT | Performed by: FAMILY MEDICINE

## 2020-10-12 PROCEDURE — 85027 COMPLETE CBC AUTOMATED: CPT | Performed by: PHYSICIAN ASSISTANT

## 2020-10-12 PROCEDURE — 85379 FIBRIN DEGRADATION QUANT: CPT | Performed by: PHYSICIAN ASSISTANT

## 2020-10-12 PROCEDURE — 70450 CT HEAD/BRAIN W/O DYE: CPT

## 2020-10-12 PROCEDURE — 84484 ASSAY OF TROPONIN QUANT: CPT | Performed by: PHYSICIAN ASSISTANT

## 2020-10-12 PROCEDURE — 93970 EXTREMITY STUDY: CPT

## 2020-10-12 PROCEDURE — 84145 PROCALCITONIN (PCT): CPT | Performed by: FAMILY MEDICINE

## 2020-10-12 PROCEDURE — 83735 ASSAY OF MAGNESIUM: CPT | Performed by: PHYSICIAN ASSISTANT

## 2020-10-12 PROCEDURE — 99222 1ST HOSP IP/OBS MODERATE 55: CPT | Performed by: PHYSICIAN ASSISTANT

## 2020-10-12 PROCEDURE — 71275 CT ANGIOGRAPHY CHEST: CPT

## 2020-10-12 PROCEDURE — NC001 PR NO CHARGE: Performed by: PHYSICIAN ASSISTANT

## 2020-10-12 PROCEDURE — 99233 SBSQ HOSP IP/OBS HIGH 50: CPT | Performed by: FAMILY MEDICINE

## 2020-10-12 RX ORDER — LORAZEPAM 2 MG/ML
4 INJECTION INTRAMUSCULAR ONCE
Status: COMPLETED | OUTPATIENT
Start: 2020-10-12 | End: 2020-10-12

## 2020-10-12 RX ORDER — POTASSIUM CHLORIDE 14.9 MG/ML
20 INJECTION INTRAVENOUS ONCE
Status: COMPLETED | OUTPATIENT
Start: 2020-10-12 | End: 2020-10-13

## 2020-10-12 RX ORDER — POTASSIUM CHLORIDE 14.9 MG/ML
20 INJECTION INTRAVENOUS ONCE
Status: COMPLETED | OUTPATIENT
Start: 2020-10-12 | End: 2020-10-12

## 2020-10-12 RX ORDER — LORAZEPAM 2 MG/ML
1 INJECTION INTRAMUSCULAR ONCE
Status: COMPLETED | OUTPATIENT
Start: 2020-10-12 | End: 2020-10-12

## 2020-10-12 RX ADMIN — LORAZEPAM 1 MG: 2 INJECTION INTRAMUSCULAR; INTRAVENOUS at 19:36

## 2020-10-12 RX ADMIN — FONDAPARINUX SODIUM 2.5 MG: 2.5 INJECTION, SOLUTION SUBCUTANEOUS at 09:34

## 2020-10-12 RX ADMIN — THIAMINE HYDROCHLORIDE 500 MG: 100 INJECTION, SOLUTION INTRAMUSCULAR; INTRAVENOUS at 21:18

## 2020-10-12 RX ADMIN — Medication 0.2 MCG/KG/HR: at 00:30

## 2020-10-12 RX ADMIN — Medication 260 MG: at 00:10

## 2020-10-12 RX ADMIN — Medication 0.7 MCG/KG/HR: at 20:56

## 2020-10-12 RX ADMIN — IOHEXOL 100 ML: 350 INJECTION, SOLUTION INTRAVENOUS at 02:48

## 2020-10-12 RX ADMIN — LORAZEPAM 4 MG: 2 INJECTION INTRAMUSCULAR; INTRAVENOUS at 21:16

## 2020-10-12 RX ADMIN — THIAMINE HYDROCHLORIDE 500 MG: 100 INJECTION, SOLUTION INTRAMUSCULAR; INTRAVENOUS at 15:35

## 2020-10-12 RX ADMIN — THIAMINE HYDROCHLORIDE 500 MG: 100 INJECTION, SOLUTION INTRAMUSCULAR; INTRAVENOUS at 09:26

## 2020-10-12 RX ADMIN — LORAZEPAM 4 MG: 2 INJECTION INTRAMUSCULAR; INTRAVENOUS at 22:50

## 2020-10-12 RX ADMIN — Medication 0.3 MCG/KG/HR: at 07:10

## 2020-10-12 RX ADMIN — POTASSIUM CHLORIDE 20 MEQ: 14.9 INJECTION, SOLUTION INTRAVENOUS at 06:09

## 2020-10-12 RX ADMIN — POTASSIUM CHLORIDE 20 MEQ: 14.9 INJECTION, SOLUTION INTRAVENOUS at 01:12

## 2020-10-12 RX ADMIN — LORAZEPAM 1 MG: 2 INJECTION INTRAMUSCULAR; INTRAVENOUS at 17:55

## 2020-10-12 RX ADMIN — NICOTINE 1 PATCH: 14 PATCH TRANSDERMAL at 09:34

## 2020-10-13 ENCOUNTER — DOCUMENTATION (OUTPATIENT)
Dept: GASTROENTEROLOGY | Facility: CLINIC | Age: 60
End: 2020-10-13

## 2020-10-13 LAB
ALBUMIN SERPL BCP-MCNC: 2.9 G/DL (ref 3.5–5)
ALP SERPL-CCNC: 58 U/L (ref 46–116)
ALT SERPL W P-5'-P-CCNC: 34 U/L (ref 12–78)
ANION GAP SERPL CALCULATED.3IONS-SCNC: 8 MMOL/L (ref 4–13)
AST SERPL W P-5'-P-CCNC: 58 U/L (ref 5–45)
BASOPHILS # BLD AUTO: 0.02 THOUSANDS/ΜL (ref 0–0.1)
BASOPHILS NFR BLD AUTO: 1 % (ref 0–1)
BILIRUB DIRECT SERPL-MCNC: 1.35 MG/DL (ref 0–0.2)
BILIRUB SERPL-MCNC: 2.4 MG/DL (ref 0.2–1)
BUN SERPL-MCNC: 8 MG/DL (ref 5–25)
CALCIUM SERPL-MCNC: 8.1 MG/DL (ref 8.3–10.1)
CHLORIDE SERPL-SCNC: 107 MMOL/L (ref 100–108)
CO2 SERPL-SCNC: 26 MMOL/L (ref 21–32)
CREAT SERPL-MCNC: 0.61 MG/DL (ref 0.6–1.3)
EOSINOPHIL # BLD AUTO: 0.08 THOUSAND/ΜL (ref 0–0.61)
EOSINOPHIL NFR BLD AUTO: 2 % (ref 0–6)
ERYTHROCYTE [DISTWIDTH] IN BLOOD BY AUTOMATED COUNT: 15.7 % (ref 11.6–15.1)
GFR SERPL CREATININE-BSD FRML MDRD: 110 ML/MIN/1.73SQ M
GLUCOSE SERPL-MCNC: 108 MG/DL (ref 65–140)
HCT VFR BLD AUTO: 32.9 % (ref 36.5–49.3)
HGB BLD-MCNC: 10.8 G/DL (ref 12–17)
IMM GRANULOCYTES # BLD AUTO: 0.02 THOUSAND/UL (ref 0–0.2)
IMM GRANULOCYTES NFR BLD AUTO: 1 % (ref 0–2)
INR PPP: 1.28 (ref 0.84–1.19)
LYMPHOCYTES # BLD AUTO: 0.4 THOUSANDS/ΜL (ref 0.6–4.47)
LYMPHOCYTES NFR BLD AUTO: 10 % (ref 14–44)
MAGNESIUM SERPL-MCNC: 1.7 MG/DL (ref 1.6–2.6)
MCH RBC QN AUTO: 31.2 PG (ref 26.8–34.3)
MCHC RBC AUTO-ENTMCNC: 32.8 G/DL (ref 31.4–37.4)
MCV RBC AUTO: 95 FL (ref 82–98)
MONOCYTES # BLD AUTO: 0.33 THOUSAND/ΜL (ref 0.17–1.22)
MONOCYTES NFR BLD AUTO: 8 % (ref 4–12)
NEUTROPHILS # BLD AUTO: 3.26 THOUSANDS/ΜL (ref 1.85–7.62)
NEUTS SEG NFR BLD AUTO: 78 % (ref 43–75)
NRBC BLD AUTO-RTO: 0 /100 WBCS
PLATELET # BLD AUTO: 40 THOUSANDS/UL (ref 149–390)
PMV BLD AUTO: 10.8 FL (ref 8.9–12.7)
POTASSIUM SERPL-SCNC: 3.3 MMOL/L (ref 3.5–5.3)
PROCALCITONIN SERPL-MCNC: <0.05 NG/ML
PROT SERPL-MCNC: 6.6 G/DL (ref 6.4–8.2)
PROTHROMBIN TIME: 15.7 SECONDS (ref 11.6–14.5)
RBC # BLD AUTO: 3.46 MILLION/UL (ref 3.88–5.62)
SODIUM SERPL-SCNC: 141 MMOL/L (ref 136–145)
WBC # BLD AUTO: 4.11 THOUSAND/UL (ref 4.31–10.16)

## 2020-10-13 PROCEDURE — 80076 HEPATIC FUNCTION PANEL: CPT | Performed by: FAMILY MEDICINE

## 2020-10-13 PROCEDURE — 83735 ASSAY OF MAGNESIUM: CPT | Performed by: FAMILY MEDICINE

## 2020-10-13 PROCEDURE — 85610 PROTHROMBIN TIME: CPT | Performed by: FAMILY MEDICINE

## 2020-10-13 PROCEDURE — 85025 COMPLETE CBC W/AUTO DIFF WBC: CPT | Performed by: FAMILY MEDICINE

## 2020-10-13 PROCEDURE — 80048 BASIC METABOLIC PNL TOTAL CA: CPT | Performed by: FAMILY MEDICINE

## 2020-10-13 PROCEDURE — 99291 CRITICAL CARE FIRST HOUR: CPT | Performed by: FAMILY MEDICINE

## 2020-10-13 PROCEDURE — 84145 PROCALCITONIN (PCT): CPT | Performed by: FAMILY MEDICINE

## 2020-10-13 RX ORDER — POTASSIUM CHLORIDE 14.9 MG/ML
20 INJECTION INTRAVENOUS
Status: COMPLETED | OUTPATIENT
Start: 2020-10-13 | End: 2020-10-13

## 2020-10-13 RX ORDER — LORAZEPAM 2 MG/ML
4 INJECTION INTRAMUSCULAR ONCE
Status: COMPLETED | OUTPATIENT
Start: 2020-10-13 | End: 2020-10-13

## 2020-10-13 RX ORDER — METOPROLOL TARTRATE 5 MG/5ML
5 INJECTION INTRAVENOUS ONCE
Status: COMPLETED | OUTPATIENT
Start: 2020-10-13 | End: 2020-10-13

## 2020-10-13 RX ORDER — POTASSIUM CHLORIDE 20 MEQ/1
40 TABLET, EXTENDED RELEASE ORAL ONCE
Status: DISCONTINUED | OUTPATIENT
Start: 2020-10-13 | End: 2020-10-13

## 2020-10-13 RX ORDER — MAGNESIUM SULFATE HEPTAHYDRATE 40 MG/ML
2 INJECTION, SOLUTION INTRAVENOUS ONCE
Status: COMPLETED | OUTPATIENT
Start: 2020-10-13 | End: 2020-10-13

## 2020-10-13 RX ADMIN — FONDAPARINUX SODIUM 2.5 MG: 2.5 INJECTION, SOLUTION SUBCUTANEOUS at 09:16

## 2020-10-13 RX ADMIN — LORAZEPAM 4 MG: 2 INJECTION INTRAMUSCULAR; INTRAVENOUS at 15:39

## 2020-10-13 RX ADMIN — LORAZEPAM 4 MG: 2 INJECTION INTRAMUSCULAR; INTRAVENOUS at 03:29

## 2020-10-13 RX ADMIN — LORAZEPAM 4 MG: 2 INJECTION INTRAMUSCULAR; INTRAVENOUS at 20:40

## 2020-10-13 RX ADMIN — LORAZEPAM 4 MG: 2 INJECTION INTRAMUSCULAR; INTRAVENOUS at 22:07

## 2020-10-13 RX ADMIN — METOPROLOL TARTRATE 5 MG: 5 INJECTION INTRAVENOUS at 03:29

## 2020-10-13 RX ADMIN — MAGNESIUM SULFATE IN WATER 2 G: 40 INJECTION, SOLUTION INTRAVENOUS at 09:09

## 2020-10-13 RX ADMIN — POTASSIUM CHLORIDE 20 MEQ: 14.9 INJECTION, SOLUTION INTRAVENOUS at 09:04

## 2020-10-13 RX ADMIN — LORAZEPAM 4 MG: 2 INJECTION INTRAMUSCULAR; INTRAVENOUS at 09:30

## 2020-10-13 RX ADMIN — LORAZEPAM 4 MG: 2 INJECTION INTRAMUSCULAR; INTRAVENOUS at 19:22

## 2020-10-13 RX ADMIN — THIAMINE HYDROCHLORIDE 500 MG: 100 INJECTION, SOLUTION INTRAMUSCULAR; INTRAVENOUS at 20:41

## 2020-10-13 RX ADMIN — THIAMINE HYDROCHLORIDE 500 MG: 100 INJECTION, SOLUTION INTRAMUSCULAR; INTRAVENOUS at 09:04

## 2020-10-13 RX ADMIN — Medication 0.6 MCG/KG/HR: at 15:47

## 2020-10-13 RX ADMIN — THIAMINE HYDROCHLORIDE 500 MG: 100 INJECTION, SOLUTION INTRAMUSCULAR; INTRAVENOUS at 15:44

## 2020-10-13 RX ADMIN — Medication 0.5 MCG/KG/HR: at 05:06

## 2020-10-13 RX ADMIN — METOROPROLOL TARTRATE 5 MG: 5 INJECTION, SOLUTION INTRAVENOUS at 05:39

## 2020-10-13 RX ADMIN — NICOTINE 1 PATCH: 14 PATCH TRANSDERMAL at 08:52

## 2020-10-13 RX ADMIN — POTASSIUM CHLORIDE 20 MEQ: 14.9 INJECTION, SOLUTION INTRAVENOUS at 11:01

## 2020-10-14 ENCOUNTER — APPOINTMENT (INPATIENT)
Dept: MRI IMAGING | Facility: HOSPITAL | Age: 60
DRG: 896 | End: 2020-10-14
Payer: COMMERCIAL

## 2020-10-14 LAB
ALBUMIN SERPL BCP-MCNC: 2.8 G/DL (ref 3.5–5)
ALP SERPL-CCNC: 59 U/L (ref 46–116)
ALT SERPL W P-5'-P-CCNC: 28 U/L (ref 12–78)
ANION GAP SERPL CALCULATED.3IONS-SCNC: 8 MMOL/L (ref 4–13)
AST SERPL W P-5'-P-CCNC: 47 U/L (ref 5–45)
BASOPHILS # BLD AUTO: 0.01 THOUSANDS/ΜL (ref 0–0.1)
BASOPHILS NFR BLD AUTO: 0 % (ref 0–1)
BILIRUB DIRECT SERPL-MCNC: 0 MG/DL (ref 0–0.2)
BILIRUB SERPL-MCNC: 2.2 MG/DL (ref 0.2–1)
BUN SERPL-MCNC: 7 MG/DL (ref 5–25)
CALCIUM SERPL-MCNC: 8.3 MG/DL (ref 8.3–10.1)
CHLORIDE SERPL-SCNC: 104 MMOL/L (ref 100–108)
CO2 SERPL-SCNC: 27 MMOL/L (ref 21–32)
CREAT SERPL-MCNC: 0.68 MG/DL (ref 0.6–1.3)
EOSINOPHIL # BLD AUTO: 0.06 THOUSAND/ΜL (ref 0–0.61)
EOSINOPHIL NFR BLD AUTO: 2 % (ref 0–6)
ERYTHROCYTE [DISTWIDTH] IN BLOOD BY AUTOMATED COUNT: 15.9 % (ref 11.6–15.1)
GFR SERPL CREATININE-BSD FRML MDRD: 105 ML/MIN/1.73SQ M
GLUCOSE SERPL-MCNC: 99 MG/DL (ref 65–140)
HCT VFR BLD AUTO: 32.7 % (ref 36.5–49.3)
HGB BLD-MCNC: 10.6 G/DL (ref 12–17)
IMM GRANULOCYTES # BLD AUTO: 0.02 THOUSAND/UL (ref 0–0.2)
IMM GRANULOCYTES NFR BLD AUTO: 1 % (ref 0–2)
LYMPHOCYTES # BLD AUTO: 0.41 THOUSANDS/ΜL (ref 0.6–4.47)
LYMPHOCYTES NFR BLD AUTO: 12 % (ref 14–44)
MAGNESIUM SERPL-MCNC: 1.6 MG/DL (ref 1.6–2.6)
MCH RBC QN AUTO: 30.7 PG (ref 26.8–34.3)
MCHC RBC AUTO-ENTMCNC: 32.4 G/DL (ref 31.4–37.4)
MCV RBC AUTO: 95 FL (ref 82–98)
MONOCYTES # BLD AUTO: 0.43 THOUSAND/ΜL (ref 0.17–1.22)
MONOCYTES NFR BLD AUTO: 13 % (ref 4–12)
NEUTROPHILS # BLD AUTO: 2.52 THOUSANDS/ΜL (ref 1.85–7.62)
NEUTS SEG NFR BLD AUTO: 72 % (ref 43–75)
NRBC BLD AUTO-RTO: 0 /100 WBCS
PLATELET # BLD AUTO: 48 THOUSANDS/UL (ref 149–390)
PMV BLD AUTO: 10 FL (ref 8.9–12.7)
POTASSIUM SERPL-SCNC: 3.2 MMOL/L (ref 3.5–5.3)
PROT SERPL-MCNC: 6.7 G/DL (ref 6.4–8.2)
RBC # BLD AUTO: 3.45 MILLION/UL (ref 3.88–5.62)
SODIUM SERPL-SCNC: 139 MMOL/L (ref 136–145)
WBC # BLD AUTO: 3.45 THOUSAND/UL (ref 4.31–10.16)

## 2020-10-14 PROCEDURE — 80076 HEPATIC FUNCTION PANEL: CPT | Performed by: FAMILY MEDICINE

## 2020-10-14 PROCEDURE — 94664 DEMO&/EVAL PT USE INHALER: CPT

## 2020-10-14 PROCEDURE — 97167 OT EVAL HIGH COMPLEX 60 MIN: CPT

## 2020-10-14 PROCEDURE — 85025 COMPLETE CBC W/AUTO DIFF WBC: CPT | Performed by: FAMILY MEDICINE

## 2020-10-14 PROCEDURE — 97530 THERAPEUTIC ACTIVITIES: CPT

## 2020-10-14 PROCEDURE — 94760 N-INVAS EAR/PLS OXIMETRY 1: CPT

## 2020-10-14 PROCEDURE — 99291 CRITICAL CARE FIRST HOUR: CPT | Performed by: FAMILY MEDICINE

## 2020-10-14 PROCEDURE — 80048 BASIC METABOLIC PNL TOTAL CA: CPT | Performed by: FAMILY MEDICINE

## 2020-10-14 PROCEDURE — 97163 PT EVAL HIGH COMPLEX 45 MIN: CPT

## 2020-10-14 PROCEDURE — 70551 MRI BRAIN STEM W/O DYE: CPT

## 2020-10-14 PROCEDURE — 83735 ASSAY OF MAGNESIUM: CPT | Performed by: FAMILY MEDICINE

## 2020-10-14 PROCEDURE — G1004 CDSM NDSC: HCPCS

## 2020-10-14 RX ORDER — MAGNESIUM SULFATE HEPTAHYDRATE 40 MG/ML
2 INJECTION, SOLUTION INTRAVENOUS ONCE
Status: COMPLETED | OUTPATIENT
Start: 2020-10-14 | End: 2020-10-14

## 2020-10-14 RX ORDER — LORAZEPAM 2 MG/ML
4 INJECTION INTRAMUSCULAR ONCE
Status: COMPLETED | OUTPATIENT
Start: 2020-10-14 | End: 2020-10-14

## 2020-10-14 RX ORDER — HYDRALAZINE HYDROCHLORIDE 20 MG/ML
5 INJECTION INTRAMUSCULAR; INTRAVENOUS ONCE
Status: COMPLETED | OUTPATIENT
Start: 2020-10-14 | End: 2020-10-14

## 2020-10-14 RX ORDER — LORAZEPAM 2 MG/ML
2 INJECTION INTRAMUSCULAR ONCE
Status: DISCONTINUED | OUTPATIENT
Start: 2020-10-14 | End: 2020-10-18 | Stop reason: SDUPTHER

## 2020-10-14 RX ORDER — POTASSIUM CHLORIDE 14.9 MG/ML
20 INJECTION INTRAVENOUS
Status: COMPLETED | OUTPATIENT
Start: 2020-10-14 | End: 2020-10-14

## 2020-10-14 RX ORDER — NICOTINE 21 MG/24HR
14 PATCH, TRANSDERMAL 24 HOURS TRANSDERMAL ONCE
Status: COMPLETED | OUTPATIENT
Start: 2020-10-14 | End: 2020-10-15

## 2020-10-14 RX ADMIN — POTASSIUM CHLORIDE 20 MEQ: 14.9 INJECTION, SOLUTION INTRAVENOUS at 12:48

## 2020-10-14 RX ADMIN — Medication 0.7 MCG/KG/HR: at 00:03

## 2020-10-14 RX ADMIN — NICOTINE 14 MG: 14 PATCH TRANSDERMAL at 16:18

## 2020-10-14 RX ADMIN — LORAZEPAM 4 MG: 2 INJECTION INTRAMUSCULAR; INTRAVENOUS at 17:02

## 2020-10-14 RX ADMIN — MAGNESIUM SULFATE IN WATER 2 G: 40 INJECTION, SOLUTION INTRAVENOUS at 09:48

## 2020-10-14 RX ADMIN — THIAMINE HYDROCHLORIDE 500 MG: 100 INJECTION, SOLUTION INTRAMUSCULAR; INTRAVENOUS at 17:04

## 2020-10-14 RX ADMIN — Medication 0.7 MCG/KG/HR: at 02:46

## 2020-10-14 RX ADMIN — LORAZEPAM 4 MG: 2 INJECTION INTRAMUSCULAR; INTRAVENOUS at 21:48

## 2020-10-14 RX ADMIN — FONDAPARINUX SODIUM 2.5 MG: 2.5 INJECTION, SOLUTION SUBCUTANEOUS at 09:50

## 2020-10-14 RX ADMIN — LORAZEPAM 4 MG: 2 INJECTION INTRAMUSCULAR; INTRAVENOUS at 10:34

## 2020-10-14 RX ADMIN — LORAZEPAM 4 MG: 2 INJECTION INTRAMUSCULAR; INTRAVENOUS at 20:35

## 2020-10-14 RX ADMIN — THIAMINE HYDROCHLORIDE 500 MG: 100 INJECTION, SOLUTION INTRAMUSCULAR; INTRAVENOUS at 09:48

## 2020-10-14 RX ADMIN — Medication 0.3 MCG/KG/HR: at 09:29

## 2020-10-14 RX ADMIN — HYDRALAZINE HYDROCHLORIDE 5 MG: 20 INJECTION INTRAMUSCULAR; INTRAVENOUS at 01:05

## 2020-10-14 RX ADMIN — POTASSIUM CHLORIDE 20 MEQ: 14.9 INJECTION, SOLUTION INTRAVENOUS at 09:48

## 2020-10-14 RX ADMIN — THIAMINE HYDROCHLORIDE 500 MG: 100 INJECTION, SOLUTION INTRAMUSCULAR; INTRAVENOUS at 22:33

## 2020-10-14 RX ADMIN — NICOTINE 1 PATCH: 14 PATCH TRANSDERMAL at 09:50

## 2020-10-14 RX ADMIN — LORAZEPAM 4 MG: 2 INJECTION INTRAMUSCULAR; INTRAVENOUS at 19:56

## 2020-10-15 LAB
ALBUMIN SERPL BCP-MCNC: 3.2 G/DL (ref 3.5–5)
ALP SERPL-CCNC: 68 U/L (ref 46–116)
ALT SERPL W P-5'-P-CCNC: 28 U/L (ref 12–78)
ANION GAP SERPL CALCULATED.3IONS-SCNC: 11 MMOL/L (ref 4–13)
AST SERPL W P-5'-P-CCNC: 53 U/L (ref 5–45)
BASOPHILS # BLD AUTO: 0.02 THOUSANDS/ΜL (ref 0–0.1)
BASOPHILS NFR BLD AUTO: 1 % (ref 0–1)
BILIRUB SERPL-MCNC: 2.2 MG/DL (ref 0.2–1)
BUN SERPL-MCNC: 7 MG/DL (ref 5–25)
CALCIUM ALBUM COR SERPL-MCNC: 9.6 MG/DL (ref 8.3–10.1)
CALCIUM SERPL-MCNC: 9 MG/DL (ref 8.3–10.1)
CHLORIDE SERPL-SCNC: 101 MMOL/L (ref 100–108)
CO2 SERPL-SCNC: 24 MMOL/L (ref 21–32)
CREAT SERPL-MCNC: 0.64 MG/DL (ref 0.6–1.3)
EOSINOPHIL # BLD AUTO: 0.07 THOUSAND/ΜL (ref 0–0.61)
EOSINOPHIL NFR BLD AUTO: 2 % (ref 0–6)
ERYTHROCYTE [DISTWIDTH] IN BLOOD BY AUTOMATED COUNT: 15.9 % (ref 11.6–15.1)
GFR SERPL CREATININE-BSD FRML MDRD: 108 ML/MIN/1.73SQ M
GLUCOSE SERPL-MCNC: 96 MG/DL (ref 65–140)
HCT VFR BLD AUTO: 36.3 % (ref 36.5–49.3)
HGB BLD-MCNC: 12 G/DL (ref 12–17)
IMM GRANULOCYTES # BLD AUTO: 0.01 THOUSAND/UL (ref 0–0.2)
IMM GRANULOCYTES NFR BLD AUTO: 0 % (ref 0–2)
INR PPP: 1.22 (ref 0.84–1.19)
LYMPHOCYTES # BLD AUTO: 0.52 THOUSANDS/ΜL (ref 0.6–4.47)
LYMPHOCYTES NFR BLD AUTO: 12 % (ref 14–44)
MAGNESIUM SERPL-MCNC: 1.7 MG/DL (ref 1.6–2.6)
MCH RBC QN AUTO: 30.8 PG (ref 26.8–34.3)
MCHC RBC AUTO-ENTMCNC: 33.1 G/DL (ref 31.4–37.4)
MCV RBC AUTO: 93 FL (ref 82–98)
MONOCYTES # BLD AUTO: 0.56 THOUSAND/ΜL (ref 0.17–1.22)
MONOCYTES NFR BLD AUTO: 13 % (ref 4–12)
NEUTROPHILS # BLD AUTO: 3.16 THOUSANDS/ΜL (ref 1.85–7.62)
NEUTS SEG NFR BLD AUTO: 72 % (ref 43–75)
NRBC BLD AUTO-RTO: 0 /100 WBCS
PLATELET # BLD AUTO: 68 THOUSANDS/UL (ref 149–390)
PMV BLD AUTO: 10 FL (ref 8.9–12.7)
POTASSIUM SERPL-SCNC: 3.5 MMOL/L (ref 3.5–5.3)
PROT SERPL-MCNC: 7.4 G/DL (ref 6.4–8.2)
PROTHROMBIN TIME: 15.1 SECONDS (ref 11.6–14.5)
RBC # BLD AUTO: 3.9 MILLION/UL (ref 3.88–5.62)
SODIUM SERPL-SCNC: 136 MMOL/L (ref 136–145)
WBC # BLD AUTO: 4.34 THOUSAND/UL (ref 4.31–10.16)

## 2020-10-15 PROCEDURE — 83735 ASSAY OF MAGNESIUM: CPT | Performed by: FAMILY MEDICINE

## 2020-10-15 PROCEDURE — 85025 COMPLETE CBC W/AUTO DIFF WBC: CPT | Performed by: FAMILY MEDICINE

## 2020-10-15 PROCEDURE — 80053 COMPREHEN METABOLIC PANEL: CPT | Performed by: FAMILY MEDICINE

## 2020-10-15 PROCEDURE — 97530 THERAPEUTIC ACTIVITIES: CPT

## 2020-10-15 PROCEDURE — 85610 PROTHROMBIN TIME: CPT | Performed by: FAMILY MEDICINE

## 2020-10-15 PROCEDURE — 99291 CRITICAL CARE FIRST HOUR: CPT | Performed by: FAMILY MEDICINE

## 2020-10-15 PROCEDURE — 94760 N-INVAS EAR/PLS OXIMETRY 1: CPT

## 2020-10-15 RX ORDER — LORAZEPAM 2 MG/ML
4 INJECTION INTRAMUSCULAR ONCE
Status: COMPLETED | OUTPATIENT
Start: 2020-10-15 | End: 2020-10-15

## 2020-10-15 RX ORDER — LISINOPRIL 20 MG/1
20 TABLET ORAL DAILY
Status: DISCONTINUED | OUTPATIENT
Start: 2020-10-15 | End: 2020-10-25 | Stop reason: HOSPADM

## 2020-10-15 RX ORDER — LORAZEPAM 2 MG/ML
2 INJECTION INTRAMUSCULAR ONCE
Status: COMPLETED | OUTPATIENT
Start: 2020-10-15 | End: 2020-10-15

## 2020-10-15 RX ORDER — LORAZEPAM 1 MG/1
2 TABLET ORAL ONCE
Status: COMPLETED | OUTPATIENT
Start: 2020-10-15 | End: 2020-10-15

## 2020-10-15 RX ORDER — POTASSIUM CHLORIDE 20 MEQ/1
40 TABLET, EXTENDED RELEASE ORAL ONCE
Status: COMPLETED | OUTPATIENT
Start: 2020-10-15 | End: 2020-10-15

## 2020-10-15 RX ORDER — HYDRALAZINE HYDROCHLORIDE 20 MG/ML
5 INJECTION INTRAMUSCULAR; INTRAVENOUS ONCE
Status: COMPLETED | OUTPATIENT
Start: 2020-10-15 | End: 2020-10-15

## 2020-10-15 RX ORDER — CARVEDILOL 3.12 MG/1
6.25 TABLET ORAL 2 TIMES DAILY WITH MEALS
Status: DISCONTINUED | OUTPATIENT
Start: 2020-10-15 | End: 2020-10-25 | Stop reason: HOSPADM

## 2020-10-15 RX ORDER — SPIRONOLACTONE 25 MG/1
100 TABLET ORAL DAILY
Status: DISCONTINUED | OUTPATIENT
Start: 2020-10-15 | End: 2020-10-25 | Stop reason: HOSPADM

## 2020-10-15 RX ORDER — POTASSIUM CHLORIDE 20 MEQ/1
20 TABLET, EXTENDED RELEASE ORAL
Status: DISCONTINUED | OUTPATIENT
Start: 2020-10-15 | End: 2020-10-15

## 2020-10-15 RX ORDER — LABETALOL 20 MG/4 ML (5 MG/ML) INTRAVENOUS SYRINGE
5 ONCE
Status: COMPLETED | OUTPATIENT
Start: 2020-10-15 | End: 2020-10-15

## 2020-10-15 RX ADMIN — THIAMINE HYDROCHLORIDE 500 MG: 100 INJECTION, SOLUTION INTRAMUSCULAR; INTRAVENOUS at 16:42

## 2020-10-15 RX ADMIN — LORAZEPAM 1 MG: 2 INJECTION INTRAMUSCULAR; INTRAVENOUS at 11:22

## 2020-10-15 RX ADMIN — MULTIPLE VITAMINS W/ MINERALS TAB 1 TABLET: TAB at 08:45

## 2020-10-15 RX ADMIN — LORAZEPAM 2 MG: 1 TABLET ORAL at 17:37

## 2020-10-15 RX ADMIN — FONDAPARINUX SODIUM 2.5 MG: 2.5 INJECTION, SOLUTION SUBCUTANEOUS at 10:58

## 2020-10-15 RX ADMIN — Medication 0.4 MCG/KG/HR: at 15:54

## 2020-10-15 RX ADMIN — Medication 0.4 MCG/KG/HR: at 15:27

## 2020-10-15 RX ADMIN — NICOTINE 1 PATCH: 14 PATCH TRANSDERMAL at 08:47

## 2020-10-15 RX ADMIN — LORAZEPAM 4 MG: 2 INJECTION INTRAMUSCULAR; INTRAVENOUS at 21:10

## 2020-10-15 RX ADMIN — THIAMINE HYDROCHLORIDE 500 MG: 100 INJECTION, SOLUTION INTRAMUSCULAR; INTRAVENOUS at 10:56

## 2020-10-15 RX ADMIN — CARVEDILOL 6.25 MG: 3.12 TABLET, FILM COATED ORAL at 16:42

## 2020-10-15 RX ADMIN — LORAZEPAM 1 MG: 2 INJECTION INTRAMUSCULAR; INTRAVENOUS at 22:19

## 2020-10-15 RX ADMIN — LORAZEPAM 4 MG: 2 INJECTION INTRAMUSCULAR; INTRAVENOUS at 19:54

## 2020-10-15 RX ADMIN — HYDRALAZINE HYDROCHLORIDE 5 MG: 20 INJECTION INTRAMUSCULAR; INTRAVENOUS at 03:38

## 2020-10-15 RX ADMIN — CARVEDILOL 6.25 MG: 3.12 TABLET, FILM COATED ORAL at 10:54

## 2020-10-15 RX ADMIN — Medication 0.7 MCG/KG/HR: at 03:42

## 2020-10-15 RX ADMIN — LABETALOL HYDROCHLORIDE 5 MG: 5 INJECTION, SOLUTION INTRAVENOUS at 23:24

## 2020-10-15 RX ADMIN — POTASSIUM CHLORIDE 40 MEQ: 1500 TABLET, EXTENDED RELEASE ORAL at 10:55

## 2020-10-15 RX ADMIN — LORAZEPAM 2 MG: 2 INJECTION INTRAMUSCULAR; INTRAVENOUS at 02:19

## 2020-10-15 RX ADMIN — THIAMINE HYDROCHLORIDE 500 MG: 100 INJECTION, SOLUTION INTRAMUSCULAR; INTRAVENOUS at 21:10

## 2020-10-15 RX ADMIN — LISINOPRIL 20 MG: 20 TABLET ORAL at 10:56

## 2020-10-15 RX ADMIN — LORAZEPAM 2 MG: 1 TABLET ORAL at 12:34

## 2020-10-15 RX ADMIN — SPIRONOLACTONE 100 MG: 25 TABLET ORAL at 10:55

## 2020-10-16 LAB
ANION GAP SERPL CALCULATED.3IONS-SCNC: 10 MMOL/L (ref 4–13)
BASOPHILS # BLD AUTO: 0.02 THOUSANDS/ΜL (ref 0–0.1)
BASOPHILS NFR BLD AUTO: 1 % (ref 0–1)
BUN SERPL-MCNC: 9 MG/DL (ref 5–25)
CALCIUM SERPL-MCNC: 8.8 MG/DL (ref 8.3–10.1)
CHLORIDE SERPL-SCNC: 103 MMOL/L (ref 100–108)
CO2 SERPL-SCNC: 26 MMOL/L (ref 21–32)
CREAT SERPL-MCNC: 0.62 MG/DL (ref 0.6–1.3)
EOSINOPHIL # BLD AUTO: 0.08 THOUSAND/ΜL (ref 0–0.61)
EOSINOPHIL NFR BLD AUTO: 3 % (ref 0–6)
ERYTHROCYTE [DISTWIDTH] IN BLOOD BY AUTOMATED COUNT: 16 % (ref 11.6–15.1)
GFR SERPL CREATININE-BSD FRML MDRD: 109 ML/MIN/1.73SQ M
GLUCOSE SERPL-MCNC: 92 MG/DL (ref 65–140)
HCT VFR BLD AUTO: 36.1 % (ref 36.5–49.3)
HGB BLD-MCNC: 11.6 G/DL (ref 12–17)
IMM GRANULOCYTES # BLD AUTO: 0.02 THOUSAND/UL (ref 0–0.2)
IMM GRANULOCYTES NFR BLD AUTO: 1 % (ref 0–2)
LYMPHOCYTES # BLD AUTO: 0.46 THOUSANDS/ΜL (ref 0.6–4.47)
LYMPHOCYTES NFR BLD AUTO: 15 % (ref 14–44)
MAGNESIUM SERPL-MCNC: 1.7 MG/DL (ref 1.6–2.6)
MCH RBC QN AUTO: 30.1 PG (ref 26.8–34.3)
MCHC RBC AUTO-ENTMCNC: 32.1 G/DL (ref 31.4–37.4)
MCV RBC AUTO: 94 FL (ref 82–98)
MONOCYTES # BLD AUTO: 0.56 THOUSAND/ΜL (ref 0.17–1.22)
MONOCYTES NFR BLD AUTO: 19 % (ref 4–12)
NEUTROPHILS # BLD AUTO: 1.89 THOUSANDS/ΜL (ref 1.85–7.62)
NEUTS SEG NFR BLD AUTO: 61 % (ref 43–75)
NRBC BLD AUTO-RTO: 0 /100 WBCS
PLATELET # BLD AUTO: 63 THOUSANDS/UL (ref 149–390)
PMV BLD AUTO: 10.2 FL (ref 8.9–12.7)
POTASSIUM SERPL-SCNC: 3.4 MMOL/L (ref 3.5–5.3)
RBC # BLD AUTO: 3.85 MILLION/UL (ref 3.88–5.62)
SODIUM SERPL-SCNC: 139 MMOL/L (ref 136–145)
WBC # BLD AUTO: 3.03 THOUSAND/UL (ref 4.31–10.16)

## 2020-10-16 PROCEDURE — 83735 ASSAY OF MAGNESIUM: CPT | Performed by: FAMILY MEDICINE

## 2020-10-16 PROCEDURE — 94760 N-INVAS EAR/PLS OXIMETRY 1: CPT

## 2020-10-16 PROCEDURE — 85025 COMPLETE CBC W/AUTO DIFF WBC: CPT | Performed by: FAMILY MEDICINE

## 2020-10-16 PROCEDURE — 80048 BASIC METABOLIC PNL TOTAL CA: CPT | Performed by: FAMILY MEDICINE

## 2020-10-16 PROCEDURE — 99291 CRITICAL CARE FIRST HOUR: CPT | Performed by: FAMILY MEDICINE

## 2020-10-16 RX ORDER — LORAZEPAM 2 MG/ML
4 INJECTION INTRAMUSCULAR ONCE
Status: COMPLETED | OUTPATIENT
Start: 2020-10-16 | End: 2020-10-16

## 2020-10-16 RX ORDER — LORAZEPAM 2 MG/ML
4 INJECTION INTRAMUSCULAR ONCE
Status: DISCONTINUED | OUTPATIENT
Start: 2020-10-16 | End: 2020-10-18 | Stop reason: SDUPTHER

## 2020-10-16 RX ORDER — LORAZEPAM 2 MG/ML
2 INJECTION INTRAMUSCULAR ONCE
Status: COMPLETED | OUTPATIENT
Start: 2020-10-16 | End: 2020-10-16

## 2020-10-16 RX ORDER — LORAZEPAM 2 MG/ML
4 INJECTION INTRAMUSCULAR ONCE
Status: COMPLETED | OUTPATIENT
Start: 2020-10-17 | End: 2020-10-17

## 2020-10-16 RX ORDER — MAGNESIUM SULFATE HEPTAHYDRATE 40 MG/ML
2 INJECTION, SOLUTION INTRAVENOUS ONCE
Status: COMPLETED | OUTPATIENT
Start: 2020-10-16 | End: 2020-10-16

## 2020-10-16 RX ORDER — LORAZEPAM 2 MG/ML
3 INJECTION INTRAMUSCULAR ONCE
Status: COMPLETED | OUTPATIENT
Start: 2020-10-16 | End: 2020-10-16

## 2020-10-16 RX ORDER — POTASSIUM CHLORIDE 14.9 MG/ML
20 INJECTION INTRAVENOUS
Status: COMPLETED | OUTPATIENT
Start: 2020-10-16 | End: 2020-10-16

## 2020-10-16 RX ORDER — POTASSIUM CHLORIDE 20 MEQ/1
40 TABLET, EXTENDED RELEASE ORAL ONCE
Status: DISCONTINUED | OUTPATIENT
Start: 2020-10-16 | End: 2020-10-16

## 2020-10-16 RX ADMIN — LORAZEPAM 2 MG: 2 INJECTION INTRAMUSCULAR; INTRAVENOUS at 08:30

## 2020-10-16 RX ADMIN — LORAZEPAM 1 MG: 2 INJECTION INTRAMUSCULAR; INTRAVENOUS at 16:39

## 2020-10-16 RX ADMIN — MAGNESIUM SULFATE IN WATER 2 G: 40 INJECTION, SOLUTION INTRAVENOUS at 09:49

## 2020-10-16 RX ADMIN — CARVEDILOL 6.25 MG: 3.12 TABLET, FILM COATED ORAL at 07:50

## 2020-10-16 RX ADMIN — NICOTINE 1 PATCH: 14 PATCH TRANSDERMAL at 08:27

## 2020-10-16 RX ADMIN — LORAZEPAM 4 MG: 2 INJECTION INTRAMUSCULAR; INTRAVENOUS at 23:15

## 2020-10-16 RX ADMIN — POTASSIUM CHLORIDE 20 MEQ: 14.9 INJECTION, SOLUTION INTRAVENOUS at 17:29

## 2020-10-16 RX ADMIN — Medication 0.5 MCG/KG/HR: at 17:32

## 2020-10-16 RX ADMIN — CARVEDILOL 6.25 MG: 3.12 TABLET, FILM COATED ORAL at 19:20

## 2020-10-16 RX ADMIN — LORAZEPAM 1 MG: 2 INJECTION INTRAMUSCULAR; INTRAVENOUS at 09:34

## 2020-10-16 RX ADMIN — LORAZEPAM 4 MG: 2 INJECTION INTRAMUSCULAR; INTRAVENOUS at 22:24

## 2020-10-16 RX ADMIN — THIAMINE HYDROCHLORIDE 500 MG: 100 INJECTION, SOLUTION INTRAMUSCULAR; INTRAVENOUS at 21:51

## 2020-10-16 RX ADMIN — LORAZEPAM 2 MG: 2 INJECTION INTRAMUSCULAR; INTRAVENOUS at 19:18

## 2020-10-16 RX ADMIN — THIAMINE HYDROCHLORIDE 500 MG: 100 INJECTION, SOLUTION INTRAMUSCULAR; INTRAVENOUS at 16:05

## 2020-10-16 RX ADMIN — POTASSIUM CHLORIDE 20 MEQ: 14.9 INJECTION, SOLUTION INTRAVENOUS at 19:20

## 2020-10-16 RX ADMIN — THIAMINE HYDROCHLORIDE 500 MG: 100 INJECTION, SOLUTION INTRAMUSCULAR; INTRAVENOUS at 08:29

## 2020-10-16 RX ADMIN — LORAZEPAM 4 MG: 2 INJECTION INTRAMUSCULAR; INTRAVENOUS at 14:11

## 2020-10-16 RX ADMIN — LORAZEPAM 3 MG: 2 INJECTION INTRAMUSCULAR; INTRAVENOUS at 10:06

## 2020-10-16 RX ADMIN — FONDAPARINUX SODIUM 2.5 MG: 2.5 INJECTION, SOLUTION SUBCUTANEOUS at 10:07

## 2020-10-17 LAB
ANION GAP SERPL CALCULATED.3IONS-SCNC: 8 MMOL/L (ref 4–13)
BASOPHILS # BLD AUTO: 0.02 THOUSANDS/ΜL (ref 0–0.1)
BASOPHILS NFR BLD AUTO: 1 % (ref 0–1)
BUN SERPL-MCNC: 9 MG/DL (ref 5–25)
CALCIUM SERPL-MCNC: 9 MG/DL (ref 8.3–10.1)
CHLORIDE SERPL-SCNC: 102 MMOL/L (ref 100–108)
CO2 SERPL-SCNC: 27 MMOL/L (ref 21–32)
CREAT SERPL-MCNC: 0.68 MG/DL (ref 0.6–1.3)
EOSINOPHIL # BLD AUTO: 0.06 THOUSAND/ΜL (ref 0–0.61)
EOSINOPHIL NFR BLD AUTO: 2 % (ref 0–6)
ERYTHROCYTE [DISTWIDTH] IN BLOOD BY AUTOMATED COUNT: 15.9 % (ref 11.6–15.1)
GFR SERPL CREATININE-BSD FRML MDRD: 105 ML/MIN/1.73SQ M
GLUCOSE SERPL-MCNC: 109 MG/DL (ref 65–140)
HCT VFR BLD AUTO: 36.6 % (ref 36.5–49.3)
HGB BLD-MCNC: 12 G/DL (ref 12–17)
IMM GRANULOCYTES # BLD AUTO: 0.05 THOUSAND/UL (ref 0–0.2)
IMM GRANULOCYTES NFR BLD AUTO: 1 % (ref 0–2)
LYMPHOCYTES # BLD AUTO: 0.52 THOUSANDS/ΜL (ref 0.6–4.47)
LYMPHOCYTES NFR BLD AUTO: 15 % (ref 14–44)
MAGNESIUM SERPL-MCNC: 1.8 MG/DL (ref 1.6–2.6)
MCH RBC QN AUTO: 31 PG (ref 26.8–34.3)
MCHC RBC AUTO-ENTMCNC: 32.8 G/DL (ref 31.4–37.4)
MCV RBC AUTO: 95 FL (ref 82–98)
MONOCYTES # BLD AUTO: 0.59 THOUSAND/ΜL (ref 0.17–1.22)
MONOCYTES NFR BLD AUTO: 17 % (ref 4–12)
NEUTROPHILS # BLD AUTO: 2.32 THOUSANDS/ΜL (ref 1.85–7.62)
NEUTS SEG NFR BLD AUTO: 64 % (ref 43–75)
NRBC BLD AUTO-RTO: 0 /100 WBCS
PLATELET # BLD AUTO: 80 THOUSANDS/UL (ref 149–390)
PMV BLD AUTO: 10 FL (ref 8.9–12.7)
POTASSIUM SERPL-SCNC: 4.2 MMOL/L (ref 3.5–5.3)
RBC # BLD AUTO: 3.87 MILLION/UL (ref 3.88–5.62)
SODIUM SERPL-SCNC: 137 MMOL/L (ref 136–145)
WBC # BLD AUTO: 3.56 THOUSAND/UL (ref 4.31–10.16)

## 2020-10-17 PROCEDURE — 83735 ASSAY OF MAGNESIUM: CPT | Performed by: FAMILY MEDICINE

## 2020-10-17 PROCEDURE — 85025 COMPLETE CBC W/AUTO DIFF WBC: CPT | Performed by: FAMILY MEDICINE

## 2020-10-17 PROCEDURE — 99232 SBSQ HOSP IP/OBS MODERATE 35: CPT | Performed by: FAMILY MEDICINE

## 2020-10-17 PROCEDURE — 80048 BASIC METABOLIC PNL TOTAL CA: CPT | Performed by: FAMILY MEDICINE

## 2020-10-17 RX ORDER — LORAZEPAM 1 MG/1
2 TABLET ORAL ONCE
Status: COMPLETED | OUTPATIENT
Start: 2020-10-17 | End: 2020-10-17

## 2020-10-17 RX ORDER — LORAZEPAM 2 MG/ML
2 INJECTION INTRAMUSCULAR ONCE
Status: COMPLETED | OUTPATIENT
Start: 2020-10-17 | End: 2020-10-17

## 2020-10-17 RX ORDER — LORAZEPAM 2 MG/ML
4 INJECTION INTRAMUSCULAR ONCE
Status: COMPLETED | OUTPATIENT
Start: 2020-10-17 | End: 2020-10-17

## 2020-10-17 RX ORDER — CHLORDIAZEPOXIDE HYDROCHLORIDE 25 MG/1
50 CAPSULE, GELATIN COATED ORAL EVERY 8 HOURS SCHEDULED
Status: DISCONTINUED | OUTPATIENT
Start: 2020-10-17 | End: 2020-10-20

## 2020-10-17 RX ORDER — CHLORDIAZEPOXIDE HYDROCHLORIDE 25 MG/1
25 CAPSULE, GELATIN COATED ORAL EVERY 8 HOURS SCHEDULED
Status: DISCONTINUED | OUTPATIENT
Start: 2020-10-17 | End: 2020-10-17

## 2020-10-17 RX ORDER — LABETALOL 20 MG/4 ML (5 MG/ML) INTRAVENOUS SYRINGE
10 EVERY 6 HOURS PRN
Status: DISCONTINUED | OUTPATIENT
Start: 2020-10-17 | End: 2020-10-25 | Stop reason: HOSPADM

## 2020-10-17 RX ADMIN — FONDAPARINUX SODIUM 2.5 MG: 2.5 INJECTION, SOLUTION SUBCUTANEOUS at 10:09

## 2020-10-17 RX ADMIN — CARVEDILOL 6.25 MG: 3.12 TABLET, FILM COATED ORAL at 18:47

## 2020-10-17 RX ADMIN — LORAZEPAM 4 MG: 2 INJECTION INTRAMUSCULAR; INTRAVENOUS at 05:47

## 2020-10-17 RX ADMIN — NICOTINE 1 PATCH: 14 PATCH TRANSDERMAL at 10:11

## 2020-10-17 RX ADMIN — SPIRONOLACTONE 100 MG: 25 TABLET ORAL at 12:26

## 2020-10-17 RX ADMIN — LORAZEPAM 2 MG: 1 TABLET ORAL at 23:30

## 2020-10-17 RX ADMIN — Medication 0.6 MCG/KG/HR: at 13:05

## 2020-10-17 RX ADMIN — CARVEDILOL 6.25 MG: 3.12 TABLET, FILM COATED ORAL at 12:26

## 2020-10-17 RX ADMIN — MULTIPLE VITAMINS W/ MINERALS TAB 1 TABLET: TAB at 12:26

## 2020-10-17 RX ADMIN — THIAMINE HYDROCHLORIDE 500 MG: 100 INJECTION, SOLUTION INTRAMUSCULAR; INTRAVENOUS at 21:20

## 2020-10-17 RX ADMIN — LORAZEPAM 2 MG: 2 INJECTION INTRAMUSCULAR; INTRAVENOUS at 02:18

## 2020-10-17 RX ADMIN — CHLORDIAZEPOXIDE HYDROCHLORIDE 50 MG: 25 CAPSULE ORAL at 14:59

## 2020-10-17 RX ADMIN — LISINOPRIL 20 MG: 20 TABLET ORAL at 12:25

## 2020-10-17 RX ADMIN — Medication 0.7 MCG/KG/HR: at 05:45

## 2020-10-17 RX ADMIN — THIAMINE HYDROCHLORIDE 500 MG: 100 INJECTION, SOLUTION INTRAMUSCULAR; INTRAVENOUS at 16:51

## 2020-10-17 RX ADMIN — CHLORDIAZEPOXIDE HYDROCHLORIDE 50 MG: 25 CAPSULE ORAL at 21:20

## 2020-10-17 RX ADMIN — THIAMINE HYDROCHLORIDE 500 MG: 100 INJECTION, SOLUTION INTRAMUSCULAR; INTRAVENOUS at 09:28

## 2020-10-17 RX ADMIN — Medication 0.7 MCG/KG/HR: at 01:05

## 2020-10-17 RX ADMIN — LORAZEPAM 4 MG: 2 INJECTION INTRAMUSCULAR; INTRAVENOUS at 00:00

## 2020-10-18 LAB
ANION GAP SERPL CALCULATED.3IONS-SCNC: 9 MMOL/L (ref 4–13)
BASOPHILS # BLD AUTO: 0.02 THOUSANDS/ΜL (ref 0–0.1)
BASOPHILS NFR BLD AUTO: 1 % (ref 0–1)
BUN SERPL-MCNC: 11 MG/DL (ref 5–25)
CALCIUM SERPL-MCNC: 9.5 MG/DL (ref 8.3–10.1)
CHLORIDE SERPL-SCNC: 102 MMOL/L (ref 100–108)
CO2 SERPL-SCNC: 25 MMOL/L (ref 21–32)
CREAT SERPL-MCNC: 0.79 MG/DL (ref 0.6–1.3)
EOSINOPHIL # BLD AUTO: 0.05 THOUSAND/ΜL (ref 0–0.61)
EOSINOPHIL NFR BLD AUTO: 1 % (ref 0–6)
ERYTHROCYTE [DISTWIDTH] IN BLOOD BY AUTOMATED COUNT: 15.9 % (ref 11.6–15.1)
GFR SERPL CREATININE-BSD FRML MDRD: 99 ML/MIN/1.73SQ M
GLUCOSE SERPL-MCNC: 110 MG/DL (ref 65–140)
HCT VFR BLD AUTO: 38.7 % (ref 36.5–49.3)
HGB BLD-MCNC: 12.6 G/DL (ref 12–17)
IMM GRANULOCYTES # BLD AUTO: 0.02 THOUSAND/UL (ref 0–0.2)
IMM GRANULOCYTES NFR BLD AUTO: 1 % (ref 0–2)
LYMPHOCYTES # BLD AUTO: 0.48 THOUSANDS/ΜL (ref 0.6–4.47)
LYMPHOCYTES NFR BLD AUTO: 13 % (ref 14–44)
MAGNESIUM SERPL-MCNC: 1.7 MG/DL (ref 1.6–2.6)
MCH RBC QN AUTO: 30.5 PG (ref 26.8–34.3)
MCHC RBC AUTO-ENTMCNC: 32.6 G/DL (ref 31.4–37.4)
MCV RBC AUTO: 94 FL (ref 82–98)
MONOCYTES # BLD AUTO: 0.67 THOUSAND/ΜL (ref 0.17–1.22)
MONOCYTES NFR BLD AUTO: 18 % (ref 4–12)
NEUTROPHILS # BLD AUTO: 2.5 THOUSANDS/ΜL (ref 1.85–7.62)
NEUTS SEG NFR BLD AUTO: 66 % (ref 43–75)
NRBC BLD AUTO-RTO: 0 /100 WBCS
PLATELET # BLD AUTO: 92 THOUSANDS/UL (ref 149–390)
PMV BLD AUTO: 10 FL (ref 8.9–12.7)
POTASSIUM SERPL-SCNC: 3.6 MMOL/L (ref 3.5–5.3)
RBC # BLD AUTO: 4.13 MILLION/UL (ref 3.88–5.62)
SODIUM SERPL-SCNC: 136 MMOL/L (ref 136–145)
WBC # BLD AUTO: 3.74 THOUSAND/UL (ref 4.31–10.16)

## 2020-10-18 PROCEDURE — 80048 BASIC METABOLIC PNL TOTAL CA: CPT | Performed by: FAMILY MEDICINE

## 2020-10-18 PROCEDURE — 83735 ASSAY OF MAGNESIUM: CPT | Performed by: FAMILY MEDICINE

## 2020-10-18 PROCEDURE — 85025 COMPLETE CBC W/AUTO DIFF WBC: CPT | Performed by: FAMILY MEDICINE

## 2020-10-18 PROCEDURE — 99291 CRITICAL CARE FIRST HOUR: CPT | Performed by: FAMILY MEDICINE

## 2020-10-18 RX ORDER — LORAZEPAM 2 MG/ML
2 INJECTION INTRAMUSCULAR ONCE
Status: DISCONTINUED | OUTPATIENT
Start: 2020-10-18 | End: 2020-10-18

## 2020-10-18 RX ORDER — LORAZEPAM 2 MG/ML
4 INJECTION INTRAMUSCULAR ONCE
Status: COMPLETED | OUTPATIENT
Start: 2020-10-18 | End: 2020-10-18

## 2020-10-18 RX ORDER — HALOPERIDOL 5 MG/ML
2 INJECTION INTRAMUSCULAR ONCE
Status: COMPLETED | OUTPATIENT
Start: 2020-10-18 | End: 2020-10-18

## 2020-10-18 RX ORDER — LORAZEPAM 1 MG/1
2 TABLET ORAL ONCE
Status: DISCONTINUED | OUTPATIENT
Start: 2020-10-18 | End: 2020-10-20

## 2020-10-18 RX ORDER — POTASSIUM CHLORIDE 14.9 MG/ML
20 INJECTION INTRAVENOUS ONCE
Status: COMPLETED | OUTPATIENT
Start: 2020-10-18 | End: 2020-10-18

## 2020-10-18 RX ORDER — LORAZEPAM 2 MG/ML
2 INJECTION INTRAMUSCULAR ONCE
Status: COMPLETED | OUTPATIENT
Start: 2020-10-18 | End: 2020-10-18

## 2020-10-18 RX ORDER — THIAMINE MONONITRATE (VIT B1) 100 MG
100 TABLET ORAL DAILY
Status: DISCONTINUED | OUTPATIENT
Start: 2020-10-19 | End: 2020-10-25 | Stop reason: HOSPADM

## 2020-10-18 RX ORDER — MAGNESIUM SULFATE HEPTAHYDRATE 40 MG/ML
2 INJECTION, SOLUTION INTRAVENOUS ONCE
Status: COMPLETED | OUTPATIENT
Start: 2020-10-18 | End: 2020-10-18

## 2020-10-18 RX ORDER — HYDRALAZINE HYDROCHLORIDE 20 MG/ML
5 INJECTION INTRAMUSCULAR; INTRAVENOUS ONCE
Status: COMPLETED | OUTPATIENT
Start: 2020-10-18 | End: 2020-10-18

## 2020-10-18 RX ADMIN — THIAMINE HYDROCHLORIDE 500 MG: 100 INJECTION, SOLUTION INTRAMUSCULAR; INTRAVENOUS at 08:38

## 2020-10-18 RX ADMIN — LABETALOL HYDROCHLORIDE 10 MG: 5 INJECTION, SOLUTION INTRAVENOUS at 00:44

## 2020-10-18 RX ADMIN — FONDAPARINUX SODIUM 2.5 MG: 2.5 INJECTION, SOLUTION SUBCUTANEOUS at 10:14

## 2020-10-18 RX ADMIN — LISINOPRIL 20 MG: 20 TABLET ORAL at 12:55

## 2020-10-18 RX ADMIN — MULTIPLE VITAMINS W/ MINERALS TAB 1 TABLET: TAB at 12:58

## 2020-10-18 RX ADMIN — LORAZEPAM 4 MG: 2 INJECTION INTRAMUSCULAR; INTRAVENOUS at 05:00

## 2020-10-18 RX ADMIN — HYDRALAZINE HYDROCHLORIDE 5 MG: 20 INJECTION INTRAMUSCULAR; INTRAVENOUS at 04:00

## 2020-10-18 RX ADMIN — CHLORDIAZEPOXIDE HYDROCHLORIDE 50 MG: 25 CAPSULE ORAL at 05:00

## 2020-10-18 RX ADMIN — CHLORDIAZEPOXIDE HYDROCHLORIDE 50 MG: 25 CAPSULE ORAL at 13:14

## 2020-10-18 RX ADMIN — Medication 0.7 MCG/KG/HR: at 00:48

## 2020-10-18 RX ADMIN — HALOPERIDOL LACTATE 2 MG: 5 INJECTION, SOLUTION INTRAMUSCULAR at 01:28

## 2020-10-18 RX ADMIN — POTASSIUM CHLORIDE 20 MEQ: 14.9 INJECTION, SOLUTION INTRAVENOUS at 12:55

## 2020-10-18 RX ADMIN — LORAZEPAM 4 MG: 2 INJECTION INTRAMUSCULAR; INTRAVENOUS at 01:52

## 2020-10-18 RX ADMIN — LORAZEPAM 2 MG: 2 INJECTION INTRAMUSCULAR; INTRAVENOUS at 00:44

## 2020-10-18 RX ADMIN — SPIRONOLACTONE 100 MG: 25 TABLET ORAL at 12:55

## 2020-10-18 RX ADMIN — LORAZEPAM 4 MG: 2 INJECTION INTRAMUSCULAR; INTRAVENOUS at 03:25

## 2020-10-18 RX ADMIN — CHLORDIAZEPOXIDE HYDROCHLORIDE 50 MG: 25 CAPSULE ORAL at 21:30

## 2020-10-18 RX ADMIN — CARVEDILOL 6.25 MG: 3.12 TABLET, FILM COATED ORAL at 17:00

## 2020-10-18 RX ADMIN — NICOTINE 1 PATCH: 14 PATCH TRANSDERMAL at 08:37

## 2020-10-18 RX ADMIN — THIAMINE HYDROCHLORIDE 500 MG: 100 INJECTION, SOLUTION INTRAMUSCULAR; INTRAVENOUS at 17:18

## 2020-10-18 RX ADMIN — MAGNESIUM SULFATE IN WATER 2 G: 40 INJECTION, SOLUTION INTRAVENOUS at 10:21

## 2020-10-18 RX ADMIN — Medication 0.6 MCG/KG/HR: at 08:36

## 2020-10-19 LAB
AMMONIA PLAS-SCNC: <10 UMOL/L (ref 11–35)
ANION GAP SERPL CALCULATED.3IONS-SCNC: 7 MMOL/L (ref 4–13)
BASOPHILS # BLD AUTO: 0.05 THOUSANDS/ΜL (ref 0–0.1)
BASOPHILS NFR BLD AUTO: 1 % (ref 0–1)
BUN SERPL-MCNC: 14 MG/DL (ref 5–25)
CALCIUM SERPL-MCNC: 9.3 MG/DL (ref 8.3–10.1)
CHLORIDE SERPL-SCNC: 102 MMOL/L (ref 100–108)
CO2 SERPL-SCNC: 27 MMOL/L (ref 21–32)
CREAT SERPL-MCNC: 0.89 MG/DL (ref 0.6–1.3)
EOSINOPHIL # BLD AUTO: 0.08 THOUSAND/ΜL (ref 0–0.61)
EOSINOPHIL NFR BLD AUTO: 1 % (ref 0–6)
ERYTHROCYTE [DISTWIDTH] IN BLOOD BY AUTOMATED COUNT: 16.3 % (ref 11.6–15.1)
GFR SERPL CREATININE-BSD FRML MDRD: 94 ML/MIN/1.73SQ M
GLUCOSE SERPL-MCNC: 115 MG/DL (ref 65–140)
HCT VFR BLD AUTO: 40 % (ref 36.5–49.3)
HGB BLD-MCNC: 12.9 G/DL (ref 12–17)
IMM GRANULOCYTES # BLD AUTO: 0.02 THOUSAND/UL (ref 0–0.2)
IMM GRANULOCYTES NFR BLD AUTO: 0 % (ref 0–2)
LYMPHOCYTES # BLD AUTO: 0.5 THOUSANDS/ΜL (ref 0.6–4.47)
LYMPHOCYTES NFR BLD AUTO: 7 % (ref 14–44)
MAGNESIUM SERPL-MCNC: 1.9 MG/DL (ref 1.6–2.6)
MCH RBC QN AUTO: 30.4 PG (ref 26.8–34.3)
MCHC RBC AUTO-ENTMCNC: 32.3 G/DL (ref 31.4–37.4)
MCV RBC AUTO: 94 FL (ref 82–98)
MONOCYTES # BLD AUTO: 1.17 THOUSAND/ΜL (ref 0.17–1.22)
MONOCYTES NFR BLD AUTO: 15 % (ref 4–12)
NEUTROPHILS # BLD AUTO: 5.8 THOUSANDS/ΜL (ref 1.85–7.62)
NEUTS SEG NFR BLD AUTO: 76 % (ref 43–75)
NRBC BLD AUTO-RTO: 0 /100 WBCS
PLATELET # BLD AUTO: 147 THOUSANDS/UL (ref 149–390)
PMV BLD AUTO: 10 FL (ref 8.9–12.7)
POTASSIUM SERPL-SCNC: 3.9 MMOL/L (ref 3.5–5.3)
RBC # BLD AUTO: 4.25 MILLION/UL (ref 3.88–5.62)
SODIUM SERPL-SCNC: 136 MMOL/L (ref 136–145)
WBC # BLD AUTO: 7.62 THOUSAND/UL (ref 4.31–10.16)

## 2020-10-19 PROCEDURE — 82140 ASSAY OF AMMONIA: CPT | Performed by: PHYSICIAN ASSISTANT

## 2020-10-19 PROCEDURE — 94760 N-INVAS EAR/PLS OXIMETRY 1: CPT

## 2020-10-19 PROCEDURE — 85025 COMPLETE CBC W/AUTO DIFF WBC: CPT | Performed by: FAMILY MEDICINE

## 2020-10-19 PROCEDURE — 97116 GAIT TRAINING THERAPY: CPT

## 2020-10-19 PROCEDURE — 97535 SELF CARE MNGMENT TRAINING: CPT

## 2020-10-19 PROCEDURE — 90682 RIV4 VACC RECOMBINANT DNA IM: CPT | Performed by: FAMILY MEDICINE

## 2020-10-19 PROCEDURE — 99232 SBSQ HOSP IP/OBS MODERATE 35: CPT | Performed by: FAMILY MEDICINE

## 2020-10-19 PROCEDURE — 83735 ASSAY OF MAGNESIUM: CPT | Performed by: FAMILY MEDICINE

## 2020-10-19 PROCEDURE — 80048 BASIC METABOLIC PNL TOTAL CA: CPT | Performed by: FAMILY MEDICINE

## 2020-10-19 PROCEDURE — 97530 THERAPEUTIC ACTIVITIES: CPT

## 2020-10-19 PROCEDURE — G0008 ADMIN INFLUENZA VIRUS VAC: HCPCS | Performed by: FAMILY MEDICINE

## 2020-10-19 RX ORDER — OXYCODONE HYDROCHLORIDE 5 MG/1
5 TABLET ORAL EVERY 4 HOURS PRN
Status: DISCONTINUED | OUTPATIENT
Start: 2020-10-19 | End: 2020-10-20

## 2020-10-19 RX ADMIN — INFLUENZA A VIRUS A/HAWAII/70/2019 (H1N1) RECOMBINANT HEMAGGLUTININ ANTIGEN, INFLUENZA A VIRUS A/MINNESOTA/41/2019 (H3N2) RECOMBINANT HEMAGGLUTININ ANTIGEN, INFLUENZA B VIRUS B/WASHINGTON/02/2019 RECOMBINANT HEMAGGLUTININ ANTIGEN, AND INFLUENZA B VIRUS B/PHUKET/3073/2013 RECOMBINANT HEMAGGLUTININ ANTIGEN 0.5 ML: 45; 45; 45; 45 INJECTION INTRAMUSCULAR at 07:47

## 2020-10-19 RX ADMIN — MULTIPLE VITAMINS W/ MINERALS TAB 1 TABLET: TAB at 08:04

## 2020-10-19 RX ADMIN — CHLORDIAZEPOXIDE HYDROCHLORIDE 50 MG: 25 CAPSULE ORAL at 06:21

## 2020-10-19 RX ADMIN — CHLORDIAZEPOXIDE HYDROCHLORIDE 50 MG: 25 CAPSULE ORAL at 21:16

## 2020-10-19 RX ADMIN — CHLORDIAZEPOXIDE HYDROCHLORIDE 50 MG: 25 CAPSULE ORAL at 13:06

## 2020-10-19 RX ADMIN — OXYCODONE HYDROCHLORIDE 5 MG: 5 TABLET ORAL at 20:17

## 2020-10-19 RX ADMIN — Medication 100 MG: at 08:31

## 2020-10-19 RX ADMIN — FONDAPARINUX SODIUM 2.5 MG: 2.5 INJECTION, SOLUTION SUBCUTANEOUS at 09:31

## 2020-10-19 RX ADMIN — CARVEDILOL 6.25 MG: 3.12 TABLET, FILM COATED ORAL at 07:41

## 2020-10-19 RX ADMIN — NICOTINE 1 PATCH: 14 PATCH TRANSDERMAL at 08:03

## 2020-10-19 RX ADMIN — CARVEDILOL 6.25 MG: 3.12 TABLET, FILM COATED ORAL at 15:47

## 2020-10-19 RX ADMIN — SPIRONOLACTONE 100 MG: 25 TABLET ORAL at 08:03

## 2020-10-19 RX ADMIN — LISINOPRIL 20 MG: 20 TABLET ORAL at 08:04

## 2020-10-20 PROCEDURE — 97116 GAIT TRAINING THERAPY: CPT

## 2020-10-20 PROCEDURE — 97530 THERAPEUTIC ACTIVITIES: CPT

## 2020-10-20 PROCEDURE — 99232 SBSQ HOSP IP/OBS MODERATE 35: CPT | Performed by: FAMILY MEDICINE

## 2020-10-20 PROCEDURE — 94760 N-INVAS EAR/PLS OXIMETRY 1: CPT

## 2020-10-20 RX ORDER — LORAZEPAM 1 MG/1
2 TABLET ORAL ONCE
Status: COMPLETED | OUTPATIENT
Start: 2020-10-20 | End: 2020-10-20

## 2020-10-20 RX ORDER — OLANZAPINE 10 MG/1
10 INJECTION, POWDER, LYOPHILIZED, FOR SOLUTION INTRAMUSCULAR 2 TIMES DAILY PRN
Status: DISCONTINUED | OUTPATIENT
Start: 2020-10-20 | End: 2020-10-21

## 2020-10-20 RX ORDER — OXYCODONE HYDROCHLORIDE 5 MG/1
5 TABLET ORAL EVERY 8 HOURS PRN
Status: DISCONTINUED | OUTPATIENT
Start: 2020-10-20 | End: 2020-10-25 | Stop reason: HOSPADM

## 2020-10-20 RX ORDER — CHLORDIAZEPOXIDE HYDROCHLORIDE 25 MG/1
50 CAPSULE, GELATIN COATED ORAL EVERY 12 HOURS
Status: DISCONTINUED | OUTPATIENT
Start: 2020-10-20 | End: 2020-10-20

## 2020-10-20 RX ADMIN — MULTIPLE VITAMINS W/ MINERALS TAB 1 TABLET: TAB at 08:05

## 2020-10-20 RX ADMIN — Medication 100 MG: at 08:05

## 2020-10-20 RX ADMIN — OLANZAPINE 10 MG: 10 INJECTION, POWDER, FOR SOLUTION INTRAMUSCULAR at 23:39

## 2020-10-20 RX ADMIN — CARVEDILOL 6.25 MG: 3.12 TABLET, FILM COATED ORAL at 17:36

## 2020-10-20 RX ADMIN — CHLORDIAZEPOXIDE HYDROCHLORIDE 50 MG: 25 CAPSULE ORAL at 17:36

## 2020-10-20 RX ADMIN — LORAZEPAM 2 MG: 1 TABLET ORAL at 22:48

## 2020-10-20 RX ADMIN — CARVEDILOL 6.25 MG: 3.12 TABLET, FILM COATED ORAL at 08:05

## 2020-10-20 RX ADMIN — CHLORDIAZEPOXIDE HYDROCHLORIDE 50 MG: 25 CAPSULE ORAL at 05:21

## 2020-10-20 RX ADMIN — OLANZAPINE 10 MG: 10 INJECTION, POWDER, FOR SOLUTION INTRAMUSCULAR at 14:58

## 2020-10-20 RX ADMIN — WATER: 1 INJECTION INTRAMUSCULAR; INTRAVENOUS; SUBCUTANEOUS at 15:00

## 2020-10-20 RX ADMIN — NICOTINE 1 PATCH: 14 PATCH TRANSDERMAL at 08:23

## 2020-10-20 RX ADMIN — SPIRONOLACTONE 100 MG: 25 TABLET ORAL at 08:05

## 2020-10-20 RX ADMIN — OXYCODONE HYDROCHLORIDE 5 MG: 5 TABLET ORAL at 08:26

## 2020-10-20 RX ADMIN — LISINOPRIL 20 MG: 20 TABLET ORAL at 08:05

## 2020-10-20 RX ADMIN — OXYCODONE HYDROCHLORIDE 5 MG: 5 TABLET ORAL at 21:29

## 2020-10-20 RX ADMIN — FONDAPARINUX SODIUM 2.5 MG: 2.5 INJECTION, SOLUTION SUBCUTANEOUS at 10:54

## 2020-10-20 RX ADMIN — WATER 2.1 ML: 1 INJECTION INTRAMUSCULAR; INTRAVENOUS; SUBCUTANEOUS at 23:40

## 2020-10-21 PROCEDURE — 99221 1ST HOSP IP/OBS SF/LOW 40: CPT | Performed by: PSYCHIATRY & NEUROLOGY

## 2020-10-21 PROCEDURE — 99232 SBSQ HOSP IP/OBS MODERATE 35: CPT | Performed by: FAMILY MEDICINE

## 2020-10-21 PROCEDURE — 97116 GAIT TRAINING THERAPY: CPT

## 2020-10-21 PROCEDURE — 97110 THERAPEUTIC EXERCISES: CPT

## 2020-10-21 RX ORDER — LORAZEPAM 2 MG/ML
1 INJECTION INTRAMUSCULAR ONCE
Status: COMPLETED | OUTPATIENT
Start: 2020-10-21 | End: 2020-10-21

## 2020-10-21 RX ADMIN — LORAZEPAM 1 MG: 2 INJECTION INTRAMUSCULAR; INTRAVENOUS at 06:12

## 2020-10-21 RX ADMIN — MULTIPLE VITAMINS W/ MINERALS TAB 1 TABLET: TAB at 08:04

## 2020-10-21 RX ADMIN — Medication 100 MG: at 08:04

## 2020-10-21 RX ADMIN — SPIRONOLACTONE 100 MG: 25 TABLET ORAL at 08:04

## 2020-10-21 RX ADMIN — NICOTINE 1 PATCH: 14 PATCH TRANSDERMAL at 08:04

## 2020-10-21 RX ADMIN — FONDAPARINUX SODIUM 2.5 MG: 2.5 INJECTION, SOLUTION SUBCUTANEOUS at 10:01

## 2020-10-21 RX ADMIN — CARVEDILOL 6.25 MG: 3.12 TABLET, FILM COATED ORAL at 08:04

## 2020-10-21 RX ADMIN — LISINOPRIL 20 MG: 20 TABLET ORAL at 08:04

## 2020-10-21 RX ADMIN — CARVEDILOL 6.25 MG: 3.12 TABLET, FILM COATED ORAL at 16:14

## 2020-10-22 LAB
ALBUMIN SERPL BCP-MCNC: 3.2 G/DL (ref 3.5–5)
ALP SERPL-CCNC: 81 U/L (ref 46–116)
ALT SERPL W P-5'-P-CCNC: 44 U/L (ref 12–78)
ANION GAP SERPL CALCULATED.3IONS-SCNC: 4 MMOL/L (ref 4–13)
AST SERPL W P-5'-P-CCNC: 52 U/L (ref 5–45)
BASOPHILS # BLD AUTO: 0.05 THOUSANDS/ΜL (ref 0–0.1)
BASOPHILS NFR BLD AUTO: 1 % (ref 0–1)
BILIRUB SERPL-MCNC: 0.8 MG/DL (ref 0.2–1)
BUN SERPL-MCNC: 17 MG/DL (ref 5–25)
CALCIUM ALBUM COR SERPL-MCNC: 9.8 MG/DL (ref 8.3–10.1)
CALCIUM SERPL-MCNC: 9.2 MG/DL (ref 8.3–10.1)
CHLORIDE SERPL-SCNC: 105 MMOL/L (ref 100–108)
CO2 SERPL-SCNC: 30 MMOL/L (ref 21–32)
CREAT SERPL-MCNC: 0.84 MG/DL (ref 0.6–1.3)
EOSINOPHIL # BLD AUTO: 0.08 THOUSAND/ΜL (ref 0–0.61)
EOSINOPHIL NFR BLD AUTO: 2 % (ref 0–6)
ERYTHROCYTE [DISTWIDTH] IN BLOOD BY AUTOMATED COUNT: 15.9 % (ref 11.6–15.1)
GFR SERPL CREATININE-BSD FRML MDRD: 97 ML/MIN/1.73SQ M
GLUCOSE SERPL-MCNC: 83 MG/DL (ref 65–140)
HCT VFR BLD AUTO: 39.4 % (ref 36.5–49.3)
HGB BLD-MCNC: 12.3 G/DL (ref 12–17)
IMM GRANULOCYTES # BLD AUTO: 0.03 THOUSAND/UL (ref 0–0.2)
IMM GRANULOCYTES NFR BLD AUTO: 1 % (ref 0–2)
LYMPHOCYTES # BLD AUTO: 0.64 THOUSANDS/ΜL (ref 0.6–4.47)
LYMPHOCYTES NFR BLD AUTO: 13 % (ref 14–44)
MAGNESIUM SERPL-MCNC: 2 MG/DL (ref 1.6–2.6)
MCH RBC QN AUTO: 30.3 PG (ref 26.8–34.3)
MCHC RBC AUTO-ENTMCNC: 31.2 G/DL (ref 31.4–37.4)
MCV RBC AUTO: 97 FL (ref 82–98)
MONOCYTES # BLD AUTO: 0.7 THOUSAND/ΜL (ref 0.17–1.22)
MONOCYTES NFR BLD AUTO: 14 % (ref 4–12)
NEUTROPHILS # BLD AUTO: 3.48 THOUSANDS/ΜL (ref 1.85–7.62)
NEUTS SEG NFR BLD AUTO: 69 % (ref 43–75)
NRBC BLD AUTO-RTO: 0 /100 WBCS
PLATELET # BLD AUTO: 144 THOUSANDS/UL (ref 149–390)
PMV BLD AUTO: 10.5 FL (ref 8.9–12.7)
POTASSIUM SERPL-SCNC: 4.2 MMOL/L (ref 3.5–5.3)
PROT SERPL-MCNC: 7.6 G/DL (ref 6.4–8.2)
RBC # BLD AUTO: 4.06 MILLION/UL (ref 3.88–5.62)
SODIUM SERPL-SCNC: 139 MMOL/L (ref 136–145)
WBC # BLD AUTO: 4.98 THOUSAND/UL (ref 4.31–10.16)

## 2020-10-22 PROCEDURE — 85025 COMPLETE CBC W/AUTO DIFF WBC: CPT | Performed by: FAMILY MEDICINE

## 2020-10-22 PROCEDURE — 97535 SELF CARE MNGMENT TRAINING: CPT

## 2020-10-22 PROCEDURE — 80053 COMPREHEN METABOLIC PANEL: CPT | Performed by: FAMILY MEDICINE

## 2020-10-22 PROCEDURE — 97110 THERAPEUTIC EXERCISES: CPT

## 2020-10-22 PROCEDURE — 83735 ASSAY OF MAGNESIUM: CPT | Performed by: FAMILY MEDICINE

## 2020-10-22 PROCEDURE — 97116 GAIT TRAINING THERAPY: CPT

## 2020-10-22 PROCEDURE — 99232 SBSQ HOSP IP/OBS MODERATE 35: CPT | Performed by: FAMILY MEDICINE

## 2020-10-22 RX ADMIN — LISINOPRIL 20 MG: 20 TABLET ORAL at 09:13

## 2020-10-22 RX ADMIN — CARVEDILOL 6.25 MG: 3.12 TABLET, FILM COATED ORAL at 09:13

## 2020-10-22 RX ADMIN — SPIRONOLACTONE 100 MG: 25 TABLET ORAL at 09:13

## 2020-10-22 RX ADMIN — Medication 100 MG: at 09:13

## 2020-10-22 RX ADMIN — OXYCODONE HYDROCHLORIDE 5 MG: 5 TABLET ORAL at 23:28

## 2020-10-22 RX ADMIN — OXYCODONE HYDROCHLORIDE 5 MG: 5 TABLET ORAL at 11:05

## 2020-10-22 RX ADMIN — FONDAPARINUX SODIUM 2.5 MG: 2.5 INJECTION, SOLUTION SUBCUTANEOUS at 11:04

## 2020-10-22 RX ADMIN — MULTIPLE VITAMINS W/ MINERALS TAB 1 TABLET: TAB at 09:13

## 2020-10-22 RX ADMIN — NICOTINE 1 PATCH: 14 PATCH TRANSDERMAL at 09:13

## 2020-10-23 PROCEDURE — G0407 INPT/TELE FOLLOW UP 25: HCPCS | Performed by: PSYCHIATRY & NEUROLOGY

## 2020-10-23 PROCEDURE — 97530 THERAPEUTIC ACTIVITIES: CPT

## 2020-10-23 PROCEDURE — 94760 N-INVAS EAR/PLS OXIMETRY 1: CPT

## 2020-10-23 PROCEDURE — 99232 SBSQ HOSP IP/OBS MODERATE 35: CPT | Performed by: FAMILY MEDICINE

## 2020-10-23 PROCEDURE — 97116 GAIT TRAINING THERAPY: CPT

## 2020-10-23 RX ADMIN — MULTIPLE VITAMINS W/ MINERALS TAB 1 TABLET: TAB at 10:16

## 2020-10-23 RX ADMIN — LISINOPRIL 20 MG: 20 TABLET ORAL at 10:18

## 2020-10-23 RX ADMIN — SPIRONOLACTONE 100 MG: 25 TABLET ORAL at 10:18

## 2020-10-23 RX ADMIN — CARVEDILOL 6.25 MG: 3.12 TABLET, FILM COATED ORAL at 18:16

## 2020-10-23 RX ADMIN — CARVEDILOL 6.25 MG: 3.12 TABLET, FILM COATED ORAL at 10:19

## 2020-10-23 RX ADMIN — Medication 100 MG: at 10:16

## 2020-10-23 RX ADMIN — OXYCODONE HYDROCHLORIDE 5 MG: 5 TABLET ORAL at 21:44

## 2020-10-23 RX ADMIN — NICOTINE 1 PATCH: 14 PATCH TRANSDERMAL at 10:16

## 2020-10-23 RX ADMIN — OXYCODONE HYDROCHLORIDE 5 MG: 5 TABLET ORAL at 10:23

## 2020-10-23 RX ADMIN — FONDAPARINUX SODIUM 2.5 MG: 2.5 INJECTION, SOLUTION SUBCUTANEOUS at 10:53

## 2020-10-24 PROCEDURE — 94760 N-INVAS EAR/PLS OXIMETRY 1: CPT

## 2020-10-24 PROCEDURE — 99231 SBSQ HOSP IP/OBS SF/LOW 25: CPT | Performed by: FAMILY MEDICINE

## 2020-10-24 RX ADMIN — CARVEDILOL 6.25 MG: 3.12 TABLET, FILM COATED ORAL at 17:14

## 2020-10-24 RX ADMIN — FONDAPARINUX SODIUM 2.5 MG: 2.5 INJECTION, SOLUTION SUBCUTANEOUS at 11:15

## 2020-10-24 RX ADMIN — NICOTINE 1 PATCH: 14 PATCH TRANSDERMAL at 09:31

## 2020-10-24 RX ADMIN — OXYCODONE HYDROCHLORIDE 5 MG: 5 TABLET ORAL at 08:21

## 2020-10-24 RX ADMIN — LISINOPRIL 20 MG: 20 TABLET ORAL at 08:23

## 2020-10-24 RX ADMIN — OXYCODONE HYDROCHLORIDE 5 MG: 5 TABLET ORAL at 20:44

## 2020-10-24 RX ADMIN — CARVEDILOL 6.25 MG: 3.12 TABLET, FILM COATED ORAL at 08:23

## 2020-10-24 RX ADMIN — MULTIPLE VITAMINS W/ MINERALS TAB 1 TABLET: TAB at 08:24

## 2020-10-24 RX ADMIN — Medication 100 MG: at 08:24

## 2020-10-24 RX ADMIN — SPIRONOLACTONE 100 MG: 25 TABLET ORAL at 08:22

## 2020-10-25 VITALS
HEART RATE: 64 BPM | WEIGHT: 185.41 LBS | HEIGHT: 70 IN | RESPIRATION RATE: 18 BRPM | SYSTOLIC BLOOD PRESSURE: 143 MMHG | OXYGEN SATURATION: 99 % | TEMPERATURE: 98 F | DIASTOLIC BLOOD PRESSURE: 96 MMHG | BODY MASS INDEX: 26.54 KG/M2

## 2020-10-25 LAB — SARS-COV-2 RNA RESP QL NAA+PROBE: NEGATIVE

## 2020-10-25 PROCEDURE — 87635 SARS-COV-2 COVID-19 AMP PRB: CPT | Performed by: FAMILY MEDICINE

## 2020-10-25 PROCEDURE — 99239 HOSP IP/OBS DSCHRG MGMT >30: CPT | Performed by: FAMILY MEDICINE

## 2020-10-25 RX ORDER — LANOLIN ALCOHOL/MO/W.PET/CERES
100 CREAM (GRAM) TOPICAL DAILY
Qty: 30 TABLET | Refills: 0
Start: 2020-10-25

## 2020-10-25 RX ORDER — LISINOPRIL 20 MG/1
20 TABLET ORAL DAILY
Qty: 30 TABLET | Refills: 0 | Status: SHIPPED | OUTPATIENT
Start: 2020-10-25 | End: 2021-01-29 | Stop reason: ALTCHOICE

## 2020-10-25 RX ORDER — SPIRONOLACTONE 100 MG/1
100 TABLET, FILM COATED ORAL DAILY
Qty: 30 TABLET | Refills: 0 | Status: SHIPPED | OUTPATIENT
Start: 2020-10-25

## 2020-10-25 RX ORDER — CARVEDILOL 6.25 MG/1
6.25 TABLET ORAL 2 TIMES DAILY WITH MEALS
Qty: 60 TABLET | Refills: 0 | Status: SHIPPED | OUTPATIENT
Start: 2020-10-25 | End: 2020-11-10 | Stop reason: SDUPTHER

## 2020-10-25 RX ADMIN — CARVEDILOL 6.25 MG: 3.12 TABLET, FILM COATED ORAL at 08:14

## 2020-10-25 RX ADMIN — SPIRONOLACTONE 100 MG: 25 TABLET ORAL at 08:13

## 2020-10-25 RX ADMIN — NICOTINE 1 PATCH: 14 PATCH TRANSDERMAL at 08:14

## 2020-10-25 RX ADMIN — FONDAPARINUX SODIUM 2.5 MG: 2.5 INJECTION, SOLUTION SUBCUTANEOUS at 11:26

## 2020-10-25 RX ADMIN — LISINOPRIL 20 MG: 20 TABLET ORAL at 08:14

## 2020-10-25 RX ADMIN — Medication 100 MG: at 08:14

## 2020-10-25 RX ADMIN — MULTIPLE VITAMINS W/ MINERALS TAB 1 TABLET: TAB at 08:13

## 2020-10-25 RX ADMIN — OXYCODONE HYDROCHLORIDE 5 MG: 5 TABLET ORAL at 08:14

## 2020-10-27 ENCOUNTER — TELEPHONE (OUTPATIENT)
Dept: OBGYN CLINIC | Facility: HOSPITAL | Age: 60
End: 2020-10-27

## 2020-11-10 ENCOUNTER — OFFICE VISIT (OUTPATIENT)
Dept: OBGYN CLINIC | Facility: CLINIC | Age: 60
End: 2020-11-10
Payer: COMMERCIAL

## 2020-11-10 ENCOUNTER — OFFICE VISIT (OUTPATIENT)
Dept: FAMILY MEDICINE CLINIC | Facility: HOME HEALTHCARE | Age: 60
End: 2020-11-10
Payer: COMMERCIAL

## 2020-11-10 VITALS
HEART RATE: 80 BPM | HEIGHT: 70 IN | DIASTOLIC BLOOD PRESSURE: 73 MMHG | WEIGHT: 185 LBS | TEMPERATURE: 98.2 F | SYSTOLIC BLOOD PRESSURE: 114 MMHG | BODY MASS INDEX: 26.48 KG/M2

## 2020-11-10 VITALS
WEIGHT: 195 LBS | DIASTOLIC BLOOD PRESSURE: 70 MMHG | HEART RATE: 82 BPM | OXYGEN SATURATION: 97 % | TEMPERATURE: 96.6 F | SYSTOLIC BLOOD PRESSURE: 104 MMHG | BODY MASS INDEX: 27.98 KG/M2 | RESPIRATION RATE: 16 BRPM

## 2020-11-10 DIAGNOSIS — M25.572 PAIN, JOINT, ANKLE AND FOOT, LEFT: Primary | ICD-10-CM

## 2020-11-10 DIAGNOSIS — I10 ESSENTIAL HYPERTENSION WITH GOAL BLOOD PRESSURE LESS THAN 140/90: Chronic | ICD-10-CM

## 2020-11-10 DIAGNOSIS — K70.31 ALCOHOLIC CIRRHOSIS OF LIVER WITH ASCITES (HCC): Primary | ICD-10-CM

## 2020-11-10 PROCEDURE — 99213 OFFICE O/P EST LOW 20 MIN: CPT | Performed by: ORTHOPAEDIC SURGERY

## 2020-11-10 PROCEDURE — 99213 OFFICE O/P EST LOW 20 MIN: CPT | Performed by: FAMILY MEDICINE

## 2020-11-10 RX ORDER — CARVEDILOL 6.25 MG/1
6.25 TABLET ORAL 2 TIMES DAILY WITH MEALS
Qty: 60 TABLET | Refills: 0 | Status: SHIPPED | OUTPATIENT
Start: 2020-11-10 | End: 2021-01-29 | Stop reason: SDUPTHER

## 2020-11-27 ENCOUNTER — TELEPHONE (OUTPATIENT)
Dept: OBGYN CLINIC | Facility: HOSPITAL | Age: 60
End: 2020-11-27

## 2020-12-28 ENCOUNTER — TELEMEDICINE (OUTPATIENT)
Dept: FAMILY MEDICINE CLINIC | Facility: HOME HEALTHCARE | Age: 60
End: 2020-12-28
Payer: COMMERCIAL

## 2020-12-28 DIAGNOSIS — R46.89 VERBALLY ABUSIVE BEHAVIOR: ICD-10-CM

## 2020-12-28 DIAGNOSIS — Z20.822 ENCOUNTER FOR SCREENING LABORATORY TESTING FOR COVID-19 VIRUS IN ASYMPTOMATIC PATIENT: Primary | ICD-10-CM

## 2020-12-28 PROCEDURE — 99212 OFFICE O/P EST SF 10 MIN: CPT | Performed by: FAMILY MEDICINE

## 2020-12-28 PROCEDURE — G2025 DIS SITE TELE SVCS RHC/FQHC: HCPCS | Performed by: FAMILY MEDICINE

## 2021-01-22 DIAGNOSIS — G56.03 BILATERAL CARPAL TUNNEL SYNDROME: ICD-10-CM

## 2021-01-29 ENCOUNTER — OFFICE VISIT (OUTPATIENT)
Dept: FAMILY MEDICINE CLINIC | Facility: HOME HEALTHCARE | Age: 61
End: 2021-01-29
Payer: COMMERCIAL

## 2021-01-29 VITALS
DIASTOLIC BLOOD PRESSURE: 78 MMHG | OXYGEN SATURATION: 94 % | WEIGHT: 210 LBS | RESPIRATION RATE: 18 BRPM | TEMPERATURE: 97.9 F | BODY MASS INDEX: 30.06 KG/M2 | HEIGHT: 70 IN | HEART RATE: 88 BPM | SYSTOLIC BLOOD PRESSURE: 128 MMHG

## 2021-01-29 DIAGNOSIS — G56.03 BILATERAL CARPAL TUNNEL SYNDROME: ICD-10-CM

## 2021-01-29 DIAGNOSIS — F10.20 ALCOHOL USE DISORDER, SEVERE, DEPENDENCE (HCC): ICD-10-CM

## 2021-01-29 DIAGNOSIS — F31.9 BIPOLAR 1 DISORDER (HCC): ICD-10-CM

## 2021-01-29 DIAGNOSIS — I10 ESSENTIAL HYPERTENSION WITH GOAL BLOOD PRESSURE LESS THAN 140/90: Primary | Chronic | ICD-10-CM

## 2021-01-29 PROBLEM — F10.929 ALCOHOL INTOXICATION (HCC): Status: ACTIVE | Noted: 2017-12-22

## 2021-01-29 PROBLEM — R10.9 ABDOMINAL PAIN: Status: RESOLVED | Noted: 2017-04-02 | Resolved: 2021-01-29

## 2021-01-29 PROBLEM — R76.8 HEPATITIS C ANTIBODY TEST POSITIVE: Status: ACTIVE | Noted: 2017-01-20

## 2021-01-29 PROBLEM — Z72.89 ALCOHOL USE: Status: RESOLVED | Noted: 2017-04-02 | Resolved: 2021-01-29

## 2021-01-29 PROBLEM — F10.929 ALCOHOL INTOXICATION (HCC): Status: RESOLVED | Noted: 2017-12-22 | Resolved: 2021-01-29

## 2021-01-29 PROBLEM — R76.8 HEPATITIS C ANTIBODY TEST POSITIVE: Status: RESOLVED | Noted: 2017-01-20 | Resolved: 2021-01-29

## 2021-01-29 PROBLEM — F10.10 ALCOHOL ABUSE: Chronic | Status: RESOLVED | Noted: 2017-12-22 | Resolved: 2021-01-29

## 2021-01-29 PROBLEM — F12.90 MARIJUANA USE: Status: ACTIVE | Noted: 2020-09-14

## 2021-01-29 PROCEDURE — 99213 OFFICE O/P EST LOW 20 MIN: CPT | Performed by: FAMILY MEDICINE

## 2021-01-29 RX ORDER — GABAPENTIN 600 MG/1
600 TABLET ORAL 3 TIMES DAILY
Qty: 90 TABLET | Refills: 2 | Status: SHIPPED | OUTPATIENT
Start: 2021-01-29

## 2021-01-29 RX ORDER — METHOCARBAMOL 750 MG/1
750 TABLET, FILM COATED ORAL EVERY 6 HOURS PRN
COMMUNITY
Start: 2020-12-07

## 2021-01-29 RX ORDER — HYDROXYZINE HYDROCHLORIDE 25 MG/1
TABLET, FILM COATED ORAL
COMMUNITY
Start: 2020-12-23

## 2021-01-29 RX ORDER — GABAPENTIN 600 MG/1
600 TABLET ORAL 3 TIMES DAILY
COMMUNITY
Start: 2020-12-23 | End: 2021-01-29 | Stop reason: SDUPTHER

## 2021-01-29 RX ORDER — GABAPENTIN 600 MG/1
TABLET ORAL
Qty: 90 TABLET | Refills: 2 | OUTPATIENT
Start: 2021-01-29

## 2021-01-29 RX ORDER — CITALOPRAM 20 MG/1
TABLET ORAL
COMMUNITY
Start: 2020-12-12

## 2021-01-29 RX ORDER — CLONIDINE HYDROCHLORIDE 0.1 MG/1
TABLET ORAL
COMMUNITY
Start: 2020-12-03

## 2021-01-29 RX ORDER — TRAZODONE HYDROCHLORIDE 100 MG/1
200 TABLET ORAL
COMMUNITY
Start: 2020-12-12 | End: 2021-01-29 | Stop reason: SDUPTHER

## 2021-01-29 RX ORDER — TRAZODONE HYDROCHLORIDE 100 MG/1
200 TABLET ORAL
Qty: 60 TABLET | Refills: 2 | Status: SHIPPED | OUTPATIENT
Start: 2021-01-29

## 2021-01-29 RX ORDER — PSEUDOEPHEDRINE HYDROCHLORIDE 60 MG/1
TABLET, FILM COATED ORAL
COMMUNITY
Start: 2020-12-14 | End: 2021-01-29 | Stop reason: ALTCHOICE

## 2021-01-29 RX ORDER — ARIPIPRAZOLE 5 MG/1
TABLET ORAL
COMMUNITY
Start: 2020-12-17

## 2021-01-29 RX ORDER — LISINOPRIL AND HYDROCHLOROTHIAZIDE 20; 12.5 MG/1; MG/1
1 TABLET ORAL DAILY
COMMUNITY
End: 2021-01-29 | Stop reason: SDUPTHER

## 2021-01-29 RX ORDER — CARVEDILOL 6.25 MG/1
6.25 TABLET ORAL 2 TIMES DAILY WITH MEALS
Qty: 60 TABLET | Refills: 0 | Status: SHIPPED | OUTPATIENT
Start: 2021-01-29

## 2021-01-29 RX ORDER — LISINOPRIL AND HYDROCHLOROTHIAZIDE 20; 12.5 MG/1; MG/1
1 TABLET ORAL DAILY
Qty: 30 TABLET | Refills: 2 | Status: SHIPPED | OUTPATIENT
Start: 2021-01-29

## 2021-01-29 RX ORDER — DOCUSATE SODIUM 100 MG
CAPSULE ORAL
COMMUNITY
Start: 2020-12-14 | End: 2021-01-29 | Stop reason: ALTCHOICE

## 2021-01-29 NOTE — PROGRESS NOTES
Assessment/Plan:     Diagnoses and all orders for this visit:    Essential hypertension with goal blood pressure less than 140/90  -     carvedilol (COREG) 6 25 mg tablet; Take 1 tablet (6 25 mg total) by mouth 2 (two) times a day with meals  -     lisinopril-hydrochlorothiazide (PRINZIDE,ZESTORETIC) 20-12 5 MG per tablet; Take 1 tablet by mouth daily    Bipolar 1 disorder (HCC)  -     traZODone (DESYREL) 100 mg tablet; Take 2 tablets (200 mg total) by mouth daily at bedtime as needed for sleep    Alcohol use disorder, severe, dependence (HCC)    Bilateral carpal tunnel syndrome  -     gabapentin (NEURONTIN) 600 MG tablet; Take 1 tablet (600 mg total) by mouth 3 (three) times a day    Other orders  -     Discontinue: traZODone (DESYREL) 100 mg tablet; Take 200 mg by mouth daily at bedtime as needed   -     Discontinue: SudoGest 60 MG tablet  -     methocarbamol (ROBAXIN) 750 mg tablet; Take 750 mg by mouth every 6 (six) hours as needed for muscle spasms   -     hydrOXYzine HCL (ATARAX) 25 mg tablet; take 1 tablet by mouth four times a day if needed for anxiety  -     Discontinue: gabapentin (NEURONTIN) 600 MG tablet; Take 600 mg by mouth 3 (three) times a day  -     Discontinue: Stool Softener 100 MG capsule  -     Diclofenac Sodium (VOLTAREN) 1 %; Apply 2 g topically daily   -     cloNIDine (CATAPRES) 0 1 mg tablet  -     citalopram (CeleXA) 20 mg tablet  -     ARIPiprazole (ABILIFY) 5 mg tablet  -     Discontinue: lisinopril-hydrochlorothiazide (PRINZIDE,ZESTORETIC) 20-12 5 MG per tablet; Take 1 tablet by mouth daily        - Continue lisinopril/hctz and carvedilol for HTN  Will hold off on spironolactone as his BP is well controlled today and he reports not taking this medication recently  - Continue gabapentin and trazodone as prescribed  - Continue daily thiamine supplement and abstain from alcohol use    - Will fax records release to [facility] in an attempt to obtain discharge medication records and update the chart accordingly  Return in about 3 months (around 4/29/2021) for Next scheduled follow up  Subjective:        Patient ID: Mikael Ortega is a 64 y o  male  Chief Complaint   Patient presents with   Hemalatha Sanchez is a 64year old male with history of HTN, bipolar, alcohol abuse, tobacco abuse, hepatic cirrhosis, presenting for medication refills  Patient has an extensive history of alcohol abuse and multiple ED visits and admissions for the same  He reports that he was in alcohol rehab at [facility] in [place] from 11/11/20-12/23/20  States he has been doing well since discharge  He reports previously drinking 1/2 case of beer daily  Since he has been home he reports occasional alcohol use  States he drank 2 beers yesterday as it was his son's birthday but has not been drinking daily as he was previously  Patient continues to smoke 3/4 ppd  Overall patient is unsure of his medications  He does not have any paperwork from the rehab listing which medications he was discharged on but reports needing refills for everything  Reports he has been taking lisinopril/hctz and carvedilol for BP but has been out of spironolactone  Endorses that he has been taking gabapentin prn for pain related to carpal tunnel syndrome  He claims that he stopped taking the psych meds which were prescribed from the rehab as he did not feel he needed them but would like to continue on trazodone which he was taking prior to rehab        The following portions of the patient's history were reviewed and updated as appropriate: allergies, current medications, past family history, past medical history, past social history, past surgical history and problem list     Patient Active Problem List   Diagnosis    Essential hypertension with goal blood pressure less than 140/90    Hypokalemia    Thrombocytopenia ([address] )    [facility]    Elevated lactic acid level    Syncope    Pulmonary nodule    Hepatitis C    Lesion of spleen    Lymphadenopathy    [facility]    Internal hemorrhoid    Esophageal varices (HCC)    Tobacco abuse    Ambulatory dysfunction    Rectal bleed    Acute blood loss anemia    Bipolar 1 disorder (HCC)    BPH with elevated PSA    Dyslipidemia, goal LDL below 130    NSTEMI (non-ST elevated myocardial infarction) ([facility]    Pancreatic mass    Seizure (HCC)    Liver nodule    Fracture of other specified skull and facial bones, left side, initial encounter for closed fracture (HCC)    Closed fracture of left orbital floor (HCC)    Alcohol use disorder, severe, dependence (HCC)    Liver lesion, right lobe    Noncompliance    Hyponatremia    Toxic encephalopathy    Endotracheally intubated    Delirium tremens (HCC)    Marijuana use       Current Outpatient Medications   Medication Sig Dispense Refill    carvedilol (COREG) 6 25 mg tablet Take 1 tablet (6 25 mg total) by mouth 2 (two) times a day with meals 60 tablet 0    Diclofenac Sodium (VOLTAREN) 1 % Apply 2 g topically daily       gabapentin (NEURONTIN) 600 MG tablet Take 1 tablet (600 mg total) by mouth 3 (three) times a day 90 tablet 2    hydrOXYzine HCL (ATARAX) 25 mg tablet take 1 tablet by mouth four times a day if needed for anxiety      lisinopril-hydrochlorothiazide (PRINZIDE,ZESTORETIC) 20-12 5 MG per tablet Take 1 tablet by mouth daily 30 tablet 2    methocarbamol (ROBAXIN) 750 mg tablet Take 750 mg by mouth every 6 (six) hours as needed for muscle spasms       spironolactone (ALDACTONE) 100 mg tablet Take 1 tablet (100 mg total) by mouth daily 30 tablet 0    thiamine 100 MG tablet Take 1 tablet (100 mg total) by mouth daily 30 tablet 0    traZODone (DESYREL) 100 mg tablet Take 2 tablets (200 mg total) by mouth daily at bedtime as needed for sleep 60 tablet 2    ARIPiprazole (ABILIFY) 5 mg tablet       citalopram (CeleXA) 20 mg tablet       cloNIDine (CATAPRES) 0 1 mg tablet No current facility-administered medications for this visit           Past Medical History:   Diagnosis Date    Arthritis     Esophageal varices (HCC)     Diagnosed in [facility] in 2018    GI bleed     2018    Hepatitis C     History of transfusion     Hyperlipidemia     Hypertension     Psychiatric disorder     bipolar    Renal disorder         Past Surgical History:   Procedure Laterality Date    COLON SURGERY      colonoscopy    FRACTURE SURGERY      left thumb        Social History     Socioeconomic History    Marital status: Single     Spouse name: Not on file    Number of children: Not on file    Years of education: Not on file    Highest education level: Not on file   Occupational History    Not on file   Social Needs    Financial resource strain: Not on file    Food insecurity     Worry: Not on file     Inability: Not on file    Transportation needs     Medical: Not on file     Non-medical: Not on file   Tobacco Use    Smoking status: Current Every Day Smoker     Packs/day: 0 50     Types: Cigarettes    Smokeless tobacco: Former User     Types: Chew    Tobacco comment: cant afford patches   Substance and Sexual Activity    Alcohol use: Yes     Frequency: 4 or more times a week     Drinks per session: 5 or 6     Binge frequency: Weekly     Comment: ashleigh michaels cans 6 a day    Drug use: No    Sexual activity: Yes     Partners: Female   Lifestyle    Physical activity     Days per week: Not on file     Minutes per session: Not on file    Stress: Not on file   Relationships    Social connections     Talks on phone: Not on file     Gets together: Not on file     Attends Mosque service: Not on file     Active member of club or organization: Not on file     Attends meetings of clubs or organizations: Not on file     Relationship status: Not on file    Intimate partner violence     Fear of current or ex partner: Not on file     Emotionally abused: Not on file     Physically abused: Not on file     Forced sexual activity: Not on file   Other Topics Concern    Not on file   Social History Narrative    Not on file        Review of Systems   Constitutional: Negative for chills, diaphoresis and fever  HENT: Negative for congestion, ear pain, postnasal drip, sinus pressure, sinus pain and sore throat  Eyes: Negative for photophobia, pain, discharge, redness and itching  Respiratory: Negative for cough, chest tightness, shortness of breath and wheezing  Cardiovascular: Negative for chest pain, palpitations and leg swelling  Gastrointestinal: Negative for abdominal pain, constipation, diarrhea, nausea and vomiting  Genitourinary: Negative for difficulty urinating, dysuria, frequency, hematuria and urgency  Skin: Negative for rash and wound  Neurological: Negative for dizziness, syncope, weakness, light-headedness and headaches  Psychiatric/Behavioral: Positive for dysphoric mood  Negative for self-injury, sleep disturbance and suicidal ideas  The patient is nervous/anxious  Objective:      /78   Pulse 88   Temp 97 9 °F (36 6 °C) (Tympanic)   Resp 18   Ht 5' 10" (1 778 m)   Wt 95 3 kg (210 lb)   SpO2 94%   BMI 30 13 kg/m²          Physical Exam  Vitals signs and nursing note reviewed  Constitutional:       General: He is not in acute distress  Appearance: Normal appearance  HENT:      Head: Normocephalic and atraumatic  Cardiovascular:      Rate and Rhythm: Normal rate and regular rhythm  Heart sounds: Normal heart sounds  No murmur  Pulmonary:      Effort: Pulmonary effort is normal  No respiratory distress  Breath sounds: Normal breath sounds  No wheezing  Musculoskeletal:      Right lower leg: No edema  Left lower leg: No edema  Skin:     General: Skin is warm and dry  Neurological:      General: No focal deficit present  Mental Status: He is alert and oriented to person, place, and time        Cranial Nerves: No cranial nerve deficit  Motor: No weakness     Psychiatric:         Mood and Affect: Mood normal          Behavior: Behavior normal

## 2021-03-26 ENCOUNTER — TELEPHONE (OUTPATIENT)
Dept: CARDIOLOGY CLINIC | Facility: HOSPITAL | Age: 61
End: 2021-03-26

## 2021-03-26 NOTE — TELEPHONE ENCOUNTER
I have called patient multiple times to schedule an appointment with cardiology  He has had 3 appointment's in the past two of which he did not show for and one he cancelled

## 2021-05-10 ENCOUNTER — OFFICE VISIT (OUTPATIENT)
Dept: FAMILY MEDICINE CLINIC | Facility: HOME HEALTHCARE | Age: 61
End: 2021-05-10
Payer: COMMERCIAL

## 2021-05-10 VITALS
DIASTOLIC BLOOD PRESSURE: 82 MMHG | OXYGEN SATURATION: 97 % | SYSTOLIC BLOOD PRESSURE: 142 MMHG | RESPIRATION RATE: 18 BRPM | HEART RATE: 88 BPM | WEIGHT: 207 LBS | TEMPERATURE: 97.9 F | BODY MASS INDEX: 29.63 KG/M2 | HEIGHT: 70 IN

## 2021-05-10 DIAGNOSIS — M25.572 CHRONIC PAIN OF LEFT ANKLE: ICD-10-CM

## 2021-05-10 DIAGNOSIS — I85.11 SECONDARY ESOPHAGEAL VARICES WITH BLEEDING (HCC): Chronic | ICD-10-CM

## 2021-05-10 DIAGNOSIS — K70.31 ALCOHOLIC CIRRHOSIS OF LIVER WITH ASCITES (HCC): ICD-10-CM

## 2021-05-10 DIAGNOSIS — J43.9 PULMONARY EMPHYSEMA, UNSPECIFIED EMPHYSEMA TYPE (HCC): ICD-10-CM

## 2021-05-10 DIAGNOSIS — Z72.0 TOBACCO ABUSE: Chronic | ICD-10-CM

## 2021-05-10 DIAGNOSIS — F10.20 ALCOHOL USE DISORDER, SEVERE, DEPENDENCE (HCC): ICD-10-CM

## 2021-05-10 DIAGNOSIS — I10 ESSENTIAL HYPERTENSION: ICD-10-CM

## 2021-05-10 DIAGNOSIS — F31.9 BIPOLAR 1 DISORDER (HCC): ICD-10-CM

## 2021-05-10 DIAGNOSIS — G56.03 BILATERAL CARPAL TUNNEL SYNDROME: Primary | ICD-10-CM

## 2021-05-10 DIAGNOSIS — G89.29 CHRONIC PAIN OF LEFT ANKLE: ICD-10-CM

## 2021-05-10 DIAGNOSIS — F17.210 CONTINUOUS DEPENDENCE ON CIGARETTE SMOKING: ICD-10-CM

## 2021-05-10 DIAGNOSIS — M54.32 SCIATIC PAIN, LEFT: ICD-10-CM

## 2021-05-10 PROCEDURE — 99214 OFFICE O/P EST MOD 30 MIN: CPT | Performed by: FAMILY MEDICINE

## 2021-05-10 RX ORDER — ALBUTEROL SULFATE 90 UG/1
2 AEROSOL, METERED RESPIRATORY (INHALATION) EVERY 6 HOURS PRN
Qty: 18 G | Refills: 3 | Status: SHIPPED | OUTPATIENT
Start: 2021-05-10

## 2021-05-10 RX ORDER — PREDNISONE 20 MG/1
40 TABLET ORAL DAILY
Qty: 10 TABLET | Refills: 0 | Status: SHIPPED | OUTPATIENT
Start: 2021-05-10

## 2021-05-10 NOTE — PROGRESS NOTES
[address]       NAME: Raymundo Bella is a 63 y o  male  : 1960    MRN: [id number]  DATE: May 11, 2021  TIME: 12:51 PM    Assessment and Plan   Diagnoses and all orders for this visit:    Bilateral carpal tunnel syndrome  -     Ambulatory referral to Orthopedic Surgery; Future    Tobacco abuse    Pulmonary emphysema, unspecified emphysema type ([facility] [address] )  -     Complete PFT with post bronchodilator; Future  -     albuterol (Ventolin HFA) 90 mcg/act inhaler; Inhale 2 puffs every 6 (six) hours as needed for wheezing or shortness of breath    Continuous dependence on cigarette smoking  -     Complete PFT with post bronchodilator; Future    Sciatic pain, left  -     predniSONE 20 mg tablet; Take 2 tablets (40 mg total) by mouth daily    Chronic pain of left ankle  -     Ambulatory referral to Orthopedic Surgery; Future    Alcoholic cirrhosis of liver with ascites (HCC)    Secondary esophageal varices with bleeding (HCC)    Bipolar 1 disorder (HCC)    Essential hypertension    Al[facility] use disorder, severe, dependence ([facility] )        No problem-specific Assessment & Plan notes found for this encounter  Patient Instructions    Smoking cessation discussed and encouraged  PFTs ordered  Emphysema noted on 2020 CTA chest   Patient follow-up in 2 months or sooner if needed  Patient has missed 3 cardiology appointment  Patient instructed to follow-up and keep appointment  Ongoing Alcohol cessation discussed and encouraged  Chief Complaint     Chief Complaint   Patient presents with    Follow-up     no acute complaints per pt - took his BP meds this morning around 9:30/10  Does check his BP at home with good readings per pt         History of Present Illness       HPI   65-year-old male here today follow-up visit  Patient has no new acute complaints however complaining of history of bilateral carpal tunnel syndrome and left ankle pain    Patient was supposed to follow with orthopedics in the past however failed to do so  Patient would like orthopedic referral once again  Smoking and alcohol cessation discussed and encouraged  Patient with recent ER visit for alcohol intoxication  History of liver cirrhosis and esophageal varices  Patient is also supposed to follow with Cardiology however has missed his appointments  Review of Systems   Review of Systems    Currently denies any headaches, dizziness, fevers, chills, chest pain, shortness of breath, abdominal pain, nausea, vomiting, diarrhea   positive bilateral wrist numbness/carpal tunnel and left ankle pain which has been chronic      All other systems negative    Current Medications       Current Outpatient Medications:     ARIPiprazole (ABILIFY) 5 mg tablet, , Disp: , Rfl:     carvedilol (COREG) 6 25 mg tablet, Take 1 tablet (6 25 mg total) by mouth 2 (two) times a day with meals, Disp: 60 tablet, Rfl: 0    citalopram (CeleXA) 20 mg tablet, , Disp: , Rfl:     cloNIDine (CATAPRES) 0 1 mg tablet, , Disp: , Rfl:     Diclofenac Sodium (VOLTAREN) 1 %, Apply 2 g topically daily , Disp: , Rfl:     gabapentin (NEURONTIN) 600 MG tablet, Take 1 tablet (600 mg total) by mouth 3 (three) times a day, Disp: 90 tablet, Rfl: 2    hydrOXYzine HCL (ATARAX) 25 mg tablet, take 1 tablet by mouth four times a day if needed for anxiety, Disp: , Rfl:     lisinopril-hydrochlorothiazide (PRINZIDE,ZESTORETIC) 20-12 5 MG per tablet, Take 1 tablet by mouth daily, Disp: 30 tablet, Rfl: 2    methocarbamol (ROBAXIN) 750 mg tablet, Take 750 mg by mouth every 6 (six) hours as needed for muscle spasms , Disp: , Rfl:     spironolactone (ALDACTONE) 100 mg tablet, Take 1 tablet (100 mg total) by mouth daily, Disp: 30 tablet, Rfl: 0    thiamine 100 MG tablet, Take 1 tablet (100 mg total) by mouth daily, Disp: 30 tablet, Rfl: 0    traZODone (DESYREL) 100 mg tablet, Take 2 tablets (200 mg total) by mouth daily at bedtime as needed for sleep, Disp: 60 tablet, Rfl: 2   albuterol (Ventolin HFA) 90 mcg/act inhaler, Inhale 2 puffs every 6 (six) hours as needed for wheezing or shortness of breath, Disp: 18 g, Rfl: 3    predniSONE 20 mg tablet, Take 2 tablets (40 mg total) by mouth daily, Disp: 10 tablet, Rfl: 0    Current Allergies     Allergies as of 05/10/2021 - Reviewed 05/10/2021   Allergen Reaction Noted    Acetaminophen  12/22/2017    Hydrocodone  12/22/2017    Hydrocodone-acetaminophen Rash, Abdominal Pain, and GI Intolerance 12/22/2017            The following portions of the patient's history were reviewed and updated as appropriate: allergies, current medications, past family history, past medical history, past social history, past surgical history and problem list      Past Medical History:   Diagnosis Date    Arthritis     Esophageal varices ([address] )     Diagnosed in [facility] in 2018    GI bleed     2018    Hepatitis C     History of transfusion     Hyperlipidemia     Hypertension     Psychiatric disorder     bipolar    Renal disorder        Past Surgical History:   Procedure Laterality Date    COLON SURGERY      colonoscopy    FRACTURE SURGERY      left thumb       Family History   Problem Relation Age of Onset    Cancer Mother          Medications have been verified  Objective   /82   Pulse 88   Temp 97 9 °F (36 6 °C) (Tympanic)   Resp 18   Ht 5' 10" (1 778 m)   Wt 93 9 kg (207 lb)   SpO2 97%   BMI 29 70 kg/m²        Physical Exam     Physical Exam  Vitals signs and nursing note reviewed  Constitutional:       General: He is not in acute distress  Appearance: He is not ill-appearing, toxic-appearing or diaphoretic  HENT:      Head: Normocephalic  Mouth/Throat:      Mouth: Mucous membranes are moist       Pharynx: Oropharynx is clear  Eyes:      Conjunctiva/sclera: Conjunctivae normal    Cardiovascular:      Rate and Rhythm: Normal rate and regular rhythm  Heart sounds: Normal heart sounds     Pulmonary: Effort: Pulmonary effort is normal       Breath sounds: Normal breath sounds  Abdominal:      General: Bowel sounds are normal       Palpations: Abdomen is soft  Tenderness: There is no abdominal tenderness  Musculoskeletal:      Right lower leg: No edema  Left lower leg: No edema  Neurological:      General: No focal deficit present  Mental Status: He is alert and oriented to person, place, and time     Psychiatric:         Mood and Affect: Mood normal

## 2023-07-26 NOTE — DISCHARGE SUMMARY
Discharge Summary - Vidhi Penaloza 64 y o  male MRN: [id number]    Unit/Bed#: ICU 02 Encounter: [id number]    Admission Date:   Admission Orders (From admission, onward)     Ordered        11/26/19 2356  Inpatient Admission  Once                     Admitting Diagnosis: Multiple facial fractures ([address] ) [S02 [id number]]    HPI: Per Dr Mary Knight "64 y o  male with h/o etoh use, esophageal varices, GI bleed, HCV, who presents as a trauma transfer from [employer] for several left sided facial fractures after a fall with etoh on board  The only injuries noted at presentation were left eye hematoma and hematoma of right temporo-parietal skull  Was found yelling in parking lot  Nauseated and vomited  Initial GCS 13  Became acutely combated and agitated  Given multiple facial fractures on CT, was intubated for airway protection "    Procedures Performed: No orders of the defined types were placed in this encounter  Summary of Hospital Course: Patient was extubated on rounds  He ambulated with PT and is tolerating a regular diet  Patient was evaluated by OMFS who deemed no operative intervention at this time  He is to follow up in 1 week in the office and is to adhere to sinus precautions as indicated in discharge instructions  Discharge Diagnosis:   Principal Problem:    Fracture of other specified skull and facial bones, left side, initial encounter for closed fracture [facility])  Active Problems:    Closed fracture of left orbital floor ([facility] [address] )    Alcohol intoxication ([facility] [address] )        Resolved Problems  Date Reviewed: 11/27/2019    None          Condition at Discharge: stable         Discharge instructions/Information to patient and family:   See after visit summary for information provided to patient and family  Provisions for Follow-Up Care:  See after visit summary for information related to follow-up care and any pertinent home health orders        PCP: KAIT David    Disposition: Home    Planned Readmission: No      Discharge Statement   I spent 15 minutes discharging the patient  This time was spent on the day of discharge  I had direct contact with the patient on the day of discharge  Additional documentation is required if more than 30 minutes were spent on discharge  Discharge Medications:  See after visit summary for reconciled discharge medications provided to patient and family  3